# Patient Record
Sex: MALE | Race: WHITE | Employment: FULL TIME | URBAN - NONMETROPOLITAN AREA
[De-identification: names, ages, dates, MRNs, and addresses within clinical notes are randomized per-mention and may not be internally consistent; named-entity substitution may affect disease eponyms.]

---

## 2017-07-21 ENCOUNTER — OFFICE VISIT (OUTPATIENT)
Dept: FAMILY MEDICINE | Facility: CLINIC | Age: 52
End: 2017-07-21
Payer: COMMERCIAL

## 2017-07-21 VITALS
TEMPERATURE: 97.8 F | SYSTOLIC BLOOD PRESSURE: 110 MMHG | DIASTOLIC BLOOD PRESSURE: 80 MMHG | HEART RATE: 72 BPM | OXYGEN SATURATION: 98 % | WEIGHT: 261 LBS | BODY MASS INDEX: 33.51 KG/M2

## 2017-07-21 DIAGNOSIS — E66.811 OBESITY (BMI 30.0-34.9): ICD-10-CM

## 2017-07-21 DIAGNOSIS — R53.83 FATIGUE, UNSPECIFIED TYPE: ICD-10-CM

## 2017-07-21 DIAGNOSIS — M54.41 ACUTE LOW BACK PAIN WITH RIGHT-SIDED SCIATICA, UNSPECIFIED BACK PAIN LATERALITY: Primary | ICD-10-CM

## 2017-07-21 LAB
BASOPHILS # BLD AUTO: 0 10E9/L (ref 0–0.2)
BASOPHILS NFR BLD AUTO: 0.6 %
DIFFERENTIAL METHOD BLD: NORMAL
EOSINOPHIL # BLD AUTO: 0.2 10E9/L (ref 0–0.7)
EOSINOPHIL NFR BLD AUTO: 3 %
ERYTHROCYTE [DISTWIDTH] IN BLOOD BY AUTOMATED COUNT: 13.2 % (ref 10–15)
HCT VFR BLD AUTO: 46.1 % (ref 40–53)
HGB BLD-MCNC: 16.4 G/DL (ref 13.3–17.7)
LYMPHOCYTES # BLD AUTO: 1.5 10E9/L (ref 0.8–5.3)
LYMPHOCYTES NFR BLD AUTO: 23.3 %
MCH RBC QN AUTO: 29.6 PG (ref 26.5–33)
MCHC RBC AUTO-ENTMCNC: 35.6 G/DL (ref 31.5–36.5)
MCV RBC AUTO: 83 FL (ref 78–100)
MONOCYTES # BLD AUTO: 0.7 10E9/L (ref 0–1.3)
MONOCYTES NFR BLD AUTO: 11 %
NEUTROPHILS # BLD AUTO: 4 10E9/L (ref 1.6–8.3)
NEUTROPHILS NFR BLD AUTO: 62.1 %
PLATELET # BLD AUTO: 249 10E9/L (ref 150–450)
RBC # BLD AUTO: 5.54 10E12/L (ref 4.4–5.9)
WBC # BLD AUTO: 6.4 10E9/L (ref 4–11)

## 2017-07-21 PROCEDURE — 82607 VITAMIN B-12: CPT | Performed by: NURSE PRACTITIONER

## 2017-07-21 PROCEDURE — 99214 OFFICE O/P EST MOD 30 MIN: CPT | Performed by: NURSE PRACTITIONER

## 2017-07-21 PROCEDURE — 80050 GENERAL HEALTH PANEL: CPT | Performed by: NURSE PRACTITIONER

## 2017-07-21 PROCEDURE — 36415 COLL VENOUS BLD VENIPUNCTURE: CPT | Performed by: NURSE PRACTITIONER

## 2017-07-21 NOTE — MR AVS SNAPSHOT
After Visit Summary   7/21/2017    Karthik Bird    MRN: [id number]           Patient Information     Date Of Birth          1965        Visit Information        Provider Department      7/21/2017 9:40 AM Tina Ramirez CNP [facility]        Today's Diagnoses     Obesity (BMI 30.0-34.9)    -  1    Fatigue, unspecified type        Acute low back pain with right-sided sciatica, unspecified back pain laterality          Care Instructions    Schedule Physical Therapy. If we aren't seeing improvement in 4-6 weeks, then I would recommend MRI at that time    Schedule Sleep specialty appointment    Follow sleep hygiene    Lab results pending      Back Care Tips    Caring for your back  These are things you can do to prevent a recurrence of acute back pain and to reduce symptoms from chronic back pain:    Maintain a healthy weight. If you are overweight, losing weight will help most types of back pain.    Exercise is an important part of recovery from most types of back pain. The muscles behind and in front of the spine support the back. This means strengthening both the back muscles and the abdominal muscles will provide better support for your spine.     Swimming and brisk walking are good overall exercises to improve your fitness level.    Practice safe lifting methods (below).    Practice good posture when sitting, standing and walking. Avoid prolonged sitting. This puts more stress on the lower back than standing or walking.    Wear quality shoes with sufficient arch support. Foot and ankle alignment can affect back symptoms. Women should avoid wearing high heels.    Therapeutic massage can help relax the back muscles without stretching them.    During the first 24 to 72 hours after an acute injury or flare-up of chronic back pain, apply an ice pack to the painful area for 20 minutes and then remove it for 20 minutes, over a period of 60 to 90 minutes, or several times a day. As  a safety precaution, do not use a heating pad at bedtime. Sleeping on a heating pad can lead to skin burns or tissue damage.    You can alternate ice and heat therapies.  Medications  Talk to your healthcare provider before using medicines, especially if you have other medical problems or are taking other medicines.    You may use acetaminophen or ibuprofen to control pain, unless your healthcare provider prescribed other pain medicine. If you have chronic conditions like diabetes, liver or kidney disease, stomach ulcers, or gastrointestinal bleeding, or are taking blood thinners, talk with your healthcare provider before taking any medicines.    Be careful if you are given prescription pain medicines, narcotics, or medicine for muscle spasm. They can cause drowsiness, affect your coordination, reflexes, and judgment. Do not drive or operate heavy machinery while taking these types of medicines. Take prescription pain medicine only as prescribed by your healthcare provider.  Lumbar stretch  Here is a simple stretching exercise that will help relax muscle spasm and keep your back more limber. If exercise makes your back pain worse, don t do it.    Lie on your back with your knees bent and both feet on the ground.    Slowly raise your left knee to your chest as you flatten your lower back against the floor. Hold for 5 seconds.    Relax and repeat the exercise with your right knee.    Do 10 of these exercises for each leg.  Safe lifting method    Don t bend over at the waist to lift an object off the floor.  Instead, bend your knees and hips in a squat.     Keep your back and head upright    Hold the object close to your body, directly in front of you.    Straighten your legs to lift the object.     Lower the object to the floor in the reverse fashion.    If you must slide something across the floor, push it.  Posture tips  Sitting  Sit in chairs with straight backs or low-back support. Keep your knees lower than your  hips, with your feet flat on the floor.  When driving, sit up straight. Adjust the seat forward so you are not leaning toward the steering wheel.  A small pillow or rolled towel behind your lower back may help if you are driving long distances.   Standing  When standing for long periods, shift most of your weight to one leg at a time. Alternate legs every few minutes.   Sleeping  The best way to sleep is on your side with your knees bent. Put a low pillow under your head to support your neck in a neutral spine position. Avoid thick pillows that bend your neck to one side. Put a pillow between your legs to further relax your lower back. If you sleep on your back, put pillows under your knees to support your legs in a slightly flexed position. Use a firm mattress. If your mattress sags, replace it, or use a 1/2-inch plywood board under the mattress to add support.  Follow-up care  Follow up with your healthcare provider, or as advised.  If X-rays, a CT scan or an MRI scan were taken, they will be reviewed by a radiologist. You will be notified of any new findings that may affect your care.  Call 911  Seek emergency medical care if any of the following occur:    Trouble breathing    Confusion    Very drowsy    Fainting or loss of consciousness    Rapid or very slow heart rate    Loss of  bowel or bladder control  When to seek medical care  Call your healthcare provider if any of the following occur:    Pain becomes worse or spreads to your arms or legs    Weakness or numbness in one or both arms or legs    Numbness in the groin area  Date Last Reviewed: 6/1/2016 2000-2017 [employer]. [address] [place] [address], [place] [address]. All rights reserved. This information is not intended as a substitute for professional medical care. Always follow your healthcare professional's instructions.        Exercises to Strengthen Your Lower Back  Strong lower back and abdominal muscles work together to support your  spine. The exercises below will help strengthen the lower back. It is important that you begin exercising slowly and increase levels gradually.  Always begin any exercise program with stretching. If you feel pain while doing any of these exercises, stop and talk to your doctor about a more specific exercise program that better suits your condition.   Low back stretch  The point of stretching is to make you more flexible and increase your range of motion. Stretch only as much as you are able. Stretch slowly. Do not push your stretch to the limit. If at any point you feel pain while stretching, this is your (temporary) limit.    Lie on your back with your knees bent and both feet on the ground.    Slowly raise your left knee to your chest as you flatten your lower back against the floor. Hold for 5 seconds.    Relax and repeat the exercise with your right knee.    Do 10 of these exercises for each leg.    Repeat hugging both knees to your chest at the same time.  Building lower back strength  Start your exercise routine with 10 to 30 minutes a day, 1 to 3 times a day.  Initial exercises  Lying on your back:  1. Ankle pumps: Move your foot up and down, towards your head, and then away. Repeat 10 times with each foot.  2. Heel slides: Slowly bend your knee, drawing the heel of your foot towards you. Then slide your heel/foot from you, straightening your knee. Do not lift your foot off the floor (this is not a leg lift).  3. Abdominal contraction: Bend your knees and put your hands on your stomach. Tighten your stomach muscles. Hold for 5 seconds, then relax. Repeat 10 times.  4. Straight leg raise: Bend one leg at the knee and keep the other leg straight. Tighten your stomach muscles. Slowly lift your straight leg 6 to 12 inches off the floor and hold for up to 5 seconds. Repeat 10 times on each side.  Standin. Wall squats: Stand with your back against the wall. Move your feet about 12 inches away from the wall.  Tighten your stomach muscles, and slowly bend your knees until they are at about a 45 degree angle. Do not go down too far. Hold about 5 seconds. Then slowly return to your starting position. Repeat 10 times.  2. Heel raises: Stand facing the wall. Slowly raise the heels of your feet up and down, while keeping your toes on the floor. If you have trouble balancing, you can touch the wall with your hands. Repeat 10 times.  More advanced exercises  When you feel comfortable enough, try these exercises.  1. Kneeling lumbar extension: Begin on your hands and knees. At the same time, raise and straighten your right arm and left leg until they are parallel to the ground. Hold for 2 seconds and come back slowly to a starting position. Repeat with left arm and right leg, alternating 10 times.  2. Prone lumbar extension: Lie face down, arms extended overhead, palms on the floor. At the same time, raise your right arm and left leg as high as comfortably possible. Hold for 10 seconds and slowly return to start. Repeat with left arm and right leg, alternating 10 times. Gradually build up to 20 times. (Advanced: Repeat this exercise raising both arms and both legs a few inches off the floor at the same time. Hold for 5 seconds and release.)  3. Pelvic tilt: Lie on the floor on your back with your knees bent at 90 degrees. Your feet should be flat on the floor. Inhale, exhale, then slowly contract your abdominal muscles bringing your navel toward your spine. Let your pelvis rock back until your lower back is flat on the floor. Hold for 10 seconds while breathing smoothly.  4. Abdominal crunch: Perform a pelvic tilt (above) flattening your lower back against the floor. Holding the tension in your abdominal muscles, take another breath and raise your shoulder blades off the ground (this is not a full sit-up). Keep your head in line with your body (don t bend your neck forward). Hold for 2 seconds, then slowly lower.  Date Last  "Reviewed: 6/1/2016 2000-2017 [employer]. [address] [place] [address], [place] [address]. All rights reserved. This information is not intended as a substitute for professional medical care. Always follow your healthcare professional's instructions.                Follow-ups after your visit        Additional Services     PHYSICAL THERAPY REFERRAL       *This therapy referral will be filtered to a centralized scheduling office at [facility] and the patient will receive a call to schedule an appointment at a [facility] location most convenient for them. *     [facility] provides Physical Therapy evaluation and treatment and many specialty services across the [facility] system.  If requesting a specialty program, please choose from the list below.    If you have not heard from the scheduling office within 2 business days, please call [phone] for all locations, with the exception of [place], please call [phone].  Treatment: Evaluation & Treatment  Special Instructions/Modalities:   Special Programs:     Please be aware that coverage of these services is subject to the terms and limitations of your health insurance plan.  Call member services at your health plan with any benefit or coverage questions.      **Note to Provider:  If you are referring outside of [facility] for the therapy appointment, please list the name of the location in the \"special instructions\" above, print the referral and give to the patient to schedule the appointment.            SLEEP EVALUATION & MANAGEMENT REFERRAL - ADULT       Please be aware that coverage of these services is subject to the terms and limitations of your health insurance plan.  Call member services at your health plan with any benefit or coverage questions.      Please bring the following to your appointment:    >>   List of current medications   >>   This referral request   >>   Any documents/labs given to you for this " referral    [facility] Lab  Ph [phone] (Age 2 and up)                  Future tests that were ordered for you today     Open Future Orders        Priority Expected Expires Ordered    SLEEP EVALUATION & MANAGEMENT REFERRAL - ADULT Routine  7/21/2018 7/21/2017            Who to contact     If you have questions or need follow up information about today's clinic visit or your schedule please contact [facility] directly at [phone].  Normal or non-critical lab and imaging results will be communicated to you by [employer], letter or phone within 4 business days after the clinic has received the results. If you do not hear from us within 7 days, please contact the clinic through [employer] or phone. If you have a critical or abnormal lab result, we will notify you by phone as soon as possible.  Submit refill requests through [employer] or call your pharmacy and they will forward the refill request to us. Please allow 3 business days for your refill to be completed.          Additional Information About Your Visit        [employer] Information     [employer] gives you secure access to your electronic health record. If you see a primary care provider, you can also send messages to your care team and make appointments. If you have questions, please call your primary care clinic.  If you do not have a primary care provider, please call [phone] and they will assist you.        Care EveryWhere ID     This is your Care EveryWhere ID. This could be used by other organizations to access your [facility] medical records  [id number]        Your Vitals Were     Pulse Temperature Pulse Oximetry BMI (Body Mass Index)          72 97.8  F (36.6  C) (Tympanic) 98% 33.51 kg/m2         Blood Pressure from Last 3 Encounters:   07/21/17 110/80   09/20/16 118/74   01/14/16 122/66    Weight from Last 3 Encounters:   07/21/17 261 lb (118.4 kg)   09/20/16 235 lb 9.6 oz (106.9 kg)   01/14/16 216 lb (98 kg)               We Performed the Following     CBC with platelets differential     Comprehensive metabolic panel     PHYSICAL THERAPY REFERRAL     TSH with free T4 reflex     Vitamin B12        Primary Care Provider Office Phone # Fax #    Tina Ramirez, [facility] [phone]       [facility] [address] [facility] [address]        Equal Access to Services     GIULIA HERRING : Hadii aad ku hadasho Soomaali, waaxda luqadaha, qaybta kaalmada adeegyada, waxay paolain hayaan adecarlene daleykrystamarkell sutton. So [facility] [phone].    ATENCIÓN: Si habla español, tiene a molina disposición servicios gratuitos de asistencia lingüística. Llame al [phone].    We comply with applicable federal civil rights laws and [place] laws. We do not discriminate on the basis of race, color, national origin, age, disability sex, sexual orientation or gender identity.            Thank you!     Thank you for choosing [facility]  for your care. Our goal is always to provide you with excellent care. Hearing back from our patients is one way we can continue to improve our services. Please take a few minutes to complete the written survey that you may receive in the mail after your visit with us. Thank you!             Your Updated Medication List - Protect others around you: Learn how to safely use, store and throw away your medicines at www.disposemymeds.org.          This list is accurate as of: 7/21/17 10:30 AM.  Always use your most recent med list.                   Brand Name Dispense Instructions for use Diagnosis    order for DME     1 unit marking on an U-100 insulin syringe    Equipment being ordered: heel cups    Plantar fasciitis       SUMAtriptan 25 MG tablet    IMITREX    30 tablet    Take 1-2 tablets (25-50 mg) by mouth at onset of headache May repeat in 1-2 hours after onset.    Chronic migraine without aura without status migrainosus, not intractable

## 2017-07-21 NOTE — PATIENT INSTRUCTIONS
Schedule Physical Therapy. If we aren't seeing improvement in 4-6 weeks, then I would recommend MRI at that time    Schedule Sleep specialty appointment    Follow sleep hygiene    Lab results pending      Back Care Tips    Caring for your back  These are things you can do to prevent a recurrence of acute back pain and to reduce symptoms from chronic back pain:    Maintain a healthy weight. If you are overweight, losing weight will help most types of back pain.    Exercise is an important part of recovery from most types of back pain. The muscles behind and in front of the spine support the back. This means strengthening both the back muscles and the abdominal muscles will provide better support for your spine.     Swimming and brisk walking are good overall exercises to improve your fitness level.    Practice safe lifting methods (below).    Practice good posture when sitting, standing and walking. Avoid prolonged sitting. This puts more stress on the lower back than standing or walking.    Wear quality shoes with sufficient arch support. Foot and ankle alignment can affect back symptoms. Women should avoid wearing high heels.    Therapeutic massage can help relax the back muscles without stretching them.    During the first 24 to 72 hours after an acute injury or flare-up of chronic back pain, apply an ice pack to the painful area for 20 minutes and then remove it for 20 minutes, over a period of 60 to 90 minutes, or several times a day. As a safety precaution, do not use a heating pad at bedtime. Sleeping on a heating pad can lead to skin burns or tissue damage.    You can alternate ice and heat therapies.  Medications  Talk to your healthcare provider before using medicines, especially if you have other medical problems or are taking other medicines.    You may use acetaminophen or ibuprofen to control pain, unless your healthcare provider prescribed other pain medicine. If you have chronic conditions like  diabetes, liver or kidney disease, stomach ulcers, or gastrointestinal bleeding, or are taking blood thinners, talk with your healthcare provider before taking any medicines.    Be careful if you are given prescription pain medicines, narcotics, or medicine for muscle spasm. They can cause drowsiness, affect your coordination, reflexes, and judgment. Do not drive or operate heavy machinery while taking these types of medicines. Take prescription pain medicine only as prescribed by your healthcare provider.  Lumbar stretch  Here is a simple stretching exercise that will help relax muscle spasm and keep your back more limber. If exercise makes your back pain worse, don t do it.    Lie on your back with your knees bent and both feet on the ground.    Slowly raise your left knee to your chest as you flatten your lower back against the floor. Hold for 5 seconds.    Relax and repeat the exercise with your right knee.    Do 10 of these exercises for each leg.  Safe lifting method    Don t bend over at the waist to lift an object off the floor.  Instead, bend your knees and hips in a squat.     Keep your back and head upright    Hold the object close to your body, directly in front of you.    Straighten your legs to lift the object.     Lower the object to the floor in the reverse fashion.    If you must slide something across the floor, push it.  Posture tips  Sitting  Sit in chairs with straight backs or low-back support. Keep your knees lower than your hips, with your feet flat on the floor.  When driving, sit up straight. Adjust the seat forward so you are not leaning toward the steering wheel.  A small pillow or rolled towel behind your lower back may help if you are driving long distances.   Standing  When standing for long periods, shift most of your weight to one leg at a time. Alternate legs every few minutes.   Sleeping  The best way to sleep is on your side with your knees bent. Put a low pillow under your head  to support your neck in a neutral spine position. Avoid thick pillows that bend your neck to one side. Put a pillow between your legs to further relax your lower back. If you sleep on your back, put pillows under your knees to support your legs in a slightly flexed position. Use a firm mattress. If your mattress sags, replace it, or use a 1/2-inch plywood board under the mattress to add support.  Follow-up care  Follow up with your healthcare provider, or as advised.  If X-rays, a CT scan or an MRI scan were taken, they will be reviewed by a radiologist. You will be notified of any new findings that may affect your care.  Call 911  Seek emergency medical care if any of the following occur:    Trouble breathing    Confusion    Very drowsy    Fainting or loss of consciousness    Rapid or very slow heart rate    Loss of  bowel or bladder control  When to seek medical care  Call your healthcare provider if any of the following occur:    Pain becomes worse or spreads to your arms or legs    Weakness or numbness in one or both arms or legs    Numbness in the groin area  Date Last Reviewed: 6/1/2016 2000-2017 [employer]. [address] [place], [place] [id number]. All rights reserved. This information is not intended as a substitute for professional medical care. Always follow your healthcare professional's instructions.        Exercises to Strengthen Your Lower Back  Strong lower back and abdominal muscles work together to support your spine. The exercises below will help strengthen the lower back. It is important that you begin exercising slowly and increase levels gradually.  Always begin any exercise program with stretching. If you feel pain while doing any of these exercises, stop and talk to your doctor about a more specific exercise program that better suits your condition.   Low back stretch  The point of stretching is to make you more flexible and increase your range of motion. Stretch only as  much as you are able. Stretch slowly. Do not push your stretch to the limit. If at any point you feel pain while stretching, this is your (temporary) limit.    Lie on your back with your knees bent and both feet on the ground.    Slowly raise your left knee to your chest as you flatten your lower back against the floor. Hold for 5 seconds.    Relax and repeat the exercise with your right knee.    Do 10 of these exercises for each leg.    Repeat hugging both knees to your chest at the same time.  Building lower back strength  Start your exercise routine with 10 to 30 minutes a day, 1 to 3 times a day.  Initial exercises  Lying on your back:  1. Ankle pumps: Move your foot up and down, towards your head, and then away. Repeat 10 times with each foot.  2. Heel slides: Slowly bend your knee, drawing the heel of your foot towards you. Then slide your heel/foot from you, straightening your knee. Do not lift your foot off the floor (this is not a leg lift).  3. Abdominal contraction: Bend your knees and put your hands on your stomach. Tighten your stomach muscles. Hold for 5 seconds, then relax. Repeat 10 times.  4. Straight leg raise: Bend one leg at the knee and keep the other leg straight. Tighten your stomach muscles. Slowly lift your straight leg 6 to 12 inches off the floor and hold for up to 5 seconds. Repeat 10 times on each side.  Standin. Wall squats: Stand with your back against the wall. Move your feet about 12 inches away from the wall. Tighten your stomach muscles, and slowly bend your knees until they are at about a 45 degree angle. Do not go down too far. Hold about 5 seconds. Then slowly return to your starting position. Repeat 10 times.  2. Heel raises: Stand facing the wall. Slowly raise the heels of your feet up and down, while keeping your toes on the floor. If you have trouble balancing, you can touch the wall with your hands. Repeat 10 times.  More advanced exercises  When you feel comfortable  enough, try these exercises.  1. Kneeling lumbar extension: Begin on your hands and knees. At the same time, raise and straighten your right arm and left leg until they are parallel to the ground. Hold for 2 seconds and come back slowly to a starting position. Repeat with left arm and right leg, alternating 10 times.  2. Prone lumbar extension: Lie face down, arms extended overhead, palms on the floor. At the same time, raise your right arm and left leg as high as comfortably possible. Hold for 10 seconds and slowly return to start. Repeat with left arm and right leg, alternating 10 times. Gradually build up to 20 times. (Advanced: Repeat this exercise raising both arms and both legs a few inches off the floor at the same time. Hold for 5 seconds and release.)  3. Pelvic tilt: Lie on the floor on your back with your knees bent at 90 degrees. Your feet should be flat on the floor. Inhale, exhale, then slowly contract your abdominal muscles bringing your navel toward your spine. Let your pelvis rock back until your lower back is flat on the floor. Hold for 10 seconds while breathing smoothly.  4. Abdominal crunch: Perform a pelvic tilt (above) flattening your lower back against the floor. Holding the tension in your abdominal muscles, take another breath and raise your shoulder blades off the ground (this is not a full sit-up). Keep your head in line with your body (don t bend your neck forward). Hold for 2 seconds, then slowly lower.  Date Last Reviewed: 6/1/2016 2000-2017 [employer]. [address] [place] [address], [place] [id number]. All rights reserved. This information is not intended as a substitute for professional medical care. Always follow your healthcare professional's instructions.

## 2017-07-21 NOTE — PROGRESS NOTES
SUBJECTIVE:                                                    Karthik Bird is a 52 year old male who presents to clinic today for the following health issues:    Back Pain     Sitting right leg crossed over left leg    Right side lower back    No new shoes    No physical activity- sedentary        Duration: 4-5  months         Specific cause: none    Description:   Location of pain: low back right  Character of pain: sharp and stabbing  Pain radiation:radiates into the right leg  New numbness or weakness in legs, not attributed to pain:  no     Intensity: Currently 6/10, At its worst 10/10    History:   Pain interferes with job: YES, sits in office chair   History of back problems: Yes   Any previous MRI or X-rays: None  Sees a specialist for back pain:  No  Therapies tried without relief: heat and stretch    Alleviating factors:   Improved by: laying down       Precipitating factors:  Worsened by: Bending, Standing, Sitting and Walking    Functional and Psychosocial Screen (Dioni STarT Back):      Not performed today        Accompanying Signs & Symptoms:  Risk of Fracture:  None  Risk of Cauda Equina:  None  Risk of Infection:  None  Risk of Cancer:  None  Risk of Ankylosing Spondylitis:  Onset at age <35, male, AND morning back stiffness. no     Second line: NSAIDS, muscle relaxants, and consider gabapentin for radiculopathy; opioids are usually not needed}    Wt Readings from Last 10 Encounters:   07/21/17 261 lb (118.4 kg)   09/20/16 235 lb 9.6 oz (106.9 kg)   01/14/16 216 lb (98 kg)   09/10/15 204 lb (92.5 kg)   06/23/15 218 lb (98.9 kg)   04/10/15 234 lb (106.1 kg)   03/26/15 237 lb 3.2 oz (107.6 kg)   02/26/15 244 lb (110.7 kg)   02/25/15 245 lb (111.1 kg)   02/02/15 243 lb (110.2 kg)         Fatigue      Duration: lifetime     Intensity:  moderate    Accompanying signs and symptoms: driving, watching tv, anything he feels tired all the time     History (similar episodes/previous evaluation): says he has  been like this his whole life     Precipitating or alleviating factors: None    Therapies tried and outcome: None   Driving 37 miles commute  Feeling tired while driving  Not feeling rested  No depression/anxiety  Sedentary lifestyle- weight gain  [place] sleepiness scale 16- see scanned document    HPI:   PCP:  Tina Ramirez, KATIE [phone]    Patient Active Problem List   Diagnosis     Chronic migraine without aura, not intractable     Colon polyp     CARDIOVASCULAR SCREENING; LDL GOAL LESS THAN 160     Obesity (BMI 30.0-34.9)     ED (erectile dysfunction)     H/O rotator cuff tear     Current Outpatient Prescriptions   Medication     order for DME     SUMAtriptan (IMITREX) 25 MG tablet     No current facility-administered medications for this visit.        Reviewed and updated:  Tobacco  Allergies  Meds  Med Hx  Surg Hx  Fam Hx  Soc Hx     ROS:  Constitutional, HEENT, cardiovascular, pulmonary, gi and gu systems are negative, except as otherwise noted.  CONSTITUTIONAL:NEGATIVE for fever, chills, change in weight and weight gain  MUSCULOSKELETAL: NEGATIVE for significant arthralgias or myalgia and back pain on right side with occasional radiculopathy  PSYCHIATRIC: NEGATIVE for changes in mood or affect      PHYSICAL EXAM:   /80 (BP Location: Right arm, Patient Position: Right side, Cuff Size: Adult Large)  Pulse 72  Temp 97.8  F (36.6  C) (Tympanic)  Wt 261 lb (118.4 kg)  SpO2 98%  BMI 33.51 kg/m2  Body mass index is 33.51 kg/(m^2).  GENERAL APPEARANCE: healthy, alert and no distress  NECK: no adenopathy, no asymmetry, masses, or scars and thyroid normal to palpation  RESP: lungs clear to auscultation - no rales, rhonchi or wheezes  CV: regular rates and rhythm, normal S1 S2, no S3 or S4 and no murmur, click or rub  MS: extremities normal- no gross deformities noted  ORTHO:   Low back exam:  Inspection:      no visible deformity in the low back       normal skin       normal vascular        normal lymphatic       no asymmetry  Posture:      normal  Tender :     none  ROM:      full flexion       full extension       no asymmetric rotation of the pelvis with flexion  Strength:     hip flexion 4/5 =right       knee extension 5/5 bilateral       ankle dorsiflexion 5/5 bilateral       ankle plantarflexion 5/5 bilateral       dorsiflexion of the great toe 5/5 bilateral       able to heel and toe walk  Reflexes:      patellar (L3, L4) symmetric normal       achilles tendons (S1) symmetric normal  Sensation:     grossly intact throughout lower extremities  Special tests:      straight leg raise right  positive        NEURO: Normal strength and tone, mentation intact and speech normal  PSYCH: mentation appears normal and affect normal/bright    ASSESSMENT & PLAN:   (M54.41) Acute low back pain with right-sided sciatica, unspecified back pain laterality  Comment: acute on chronic, stable  Plan: PHYSICAL THERAPY REFERRAL    (R53.83) Fatigue, unspecified type  Comment: chronic  Plan: SLEEP EVALUATION & MANAGEMENT REFERRAL - ADULT,        TSH with free T4 reflex, CBC with platelets         differential, Comprehensive metabolic panel,         Vitamin B12         (E66.9) Obesity (BMI 30.0-34.9)  (primary encounter diagnosis)  Comment: chronic, stable  Plan: recommend increase physical activity    Patient Instructions   Schedule Physical Therapy. If we aren't seeing improvement in 4-6 weeks, then I would recommend MRI at that time    Schedule Sleep specialty appointment    Follow sleep hygiene    Lab results pending      Back Care Tips    Caring for your back  These are things you can do to prevent a recurrence of acute back pain and to reduce symptoms from chronic back pain:    Maintain a healthy weight. If you are overweight, losing weight will help most types of back pain.    Exercise is an important part of recovery from most types of back pain. The muscles behind and in front of the spine support the back.  This means strengthening both the back muscles and the abdominal muscles will provide better support for your spine.     Swimming and brisk walking are good overall exercises to improve your fitness level.    Practice safe lifting methods (below).    Practice good posture when sitting, standing and walking. Avoid prolonged sitting. This puts more stress on the lower back than standing or walking.    Wear quality shoes with sufficient arch support. Foot and ankle alignment can affect back symptoms. Women should avoid wearing high heels.    Therapeutic massage can help relax the back muscles without stretching them.    During the first 24 to 72 hours after an acute injury or flare-up of chronic back pain, apply an ice pack to the painful area for 20 minutes and then remove it for 20 minutes, over a period of 60 to 90 minutes, or several times a day. As a safety precaution, do not use a heating pad at bedtime. Sleeping on a heating pad can lead to skin burns or tissue damage.    You can alternate ice and heat therapies.  Medications  Talk to your healthcare provider before using medicines, especially if you have other medical problems or are taking other medicines.    You may use acetaminophen or ibuprofen to control pain, unless your healthcare provider prescribed other pain medicine. If you have chronic conditions like diabetes, liver or kidney disease, stomach ulcers, or gastrointestinal bleeding, or are taking blood thinners, talk with your healthcare provider before taking any medicines.    Be careful if you are given prescription pain medicines, narcotics, or medicine for muscle spasm. They can cause drowsiness, affect your coordination, reflexes, and judgment. Do not drive or operate heavy machinery while taking these types of medicines. Take prescription pain medicine only as prescribed by your healthcare provider.  Lumbar stretch  Here is a simple stretching exercise that will help relax muscle spasm and keep  your back more limber. If exercise makes your back pain worse, don t do it.    Lie on your back with your knees bent and both feet on the ground.    Slowly raise your left knee to your chest as you flatten your lower back against the floor. Hold for 5 seconds.    Relax and repeat the exercise with your right knee.    Do 10 of these exercises for each leg.  Safe lifting method    Don t bend over at the waist to lift an object off the floor.  Instead, bend your knees and hips in a squat.     Keep your back and head upright    Hold the object close to your body, directly in front of you.    Straighten your legs to lift the object.     Lower the object to the floor in the reverse fashion.    If you must slide something across the floor, push it.  Posture tips  Sitting  Sit in chairs with straight backs or low-back support. Keep your knees lower than your hips, with your feet flat on the floor.  When driving, sit up straight. Adjust the seat forward so you are not leaning toward the steering wheel.  A small pillow or rolled towel behind your lower back may help if you are driving long distances.   Standing  When standing for long periods, shift most of your weight to one leg at a time. Alternate legs every few minutes.   Sleeping  The best way to sleep is on your side with your knees bent. Put a low pillow under your head to support your neck in a neutral spine position. Avoid thick pillows that bend your neck to one side. Put a pillow between your legs to further relax your lower back. If you sleep on your back, put pillows under your knees to support your legs in a slightly flexed position. Use a firm mattress. If your mattress sags, replace it, or use a 1/2-inch plywood board under the mattress to add support.  Follow-up care  Follow up with your healthcare provider, or as advised.  If X-rays, a CT scan or an MRI scan were taken, they will be reviewed by a radiologist. You will be notified of any new findings that may  affect your care.  Call 911  Seek emergency medical care if any of the following occur:    Trouble breathing    Confusion    Very drowsy    Fainting or loss of consciousness    Rapid or very slow heart rate    Loss of  bowel or bladder control  When to seek medical care  Call your healthcare provider if any of the following occur:    Pain becomes worse or spreads to your arms or legs    Weakness or numbness in one or both arms or legs    Numbness in the groin area  Date Last Reviewed: 6/1/2016 2000-2017 [employer]. [address] [place] [id number]. All rights reserved. This information is not intended as a substitute for professional medical care. Always follow your healthcare professional's instructions.        Exercises to Strengthen Your Lower Back  Strong lower back and abdominal muscles work together to support your spine. The exercises below will help strengthen the lower back. It is important that you begin exercising slowly and increase levels gradually.  Always begin any exercise program with stretching. If you feel pain while doing any of these exercises, stop and talk to your doctor about a more specific exercise program that better suits your condition.   Low back stretch  The point of stretching is to make you more flexible and increase your range of motion. Stretch only as much as you are able. Stretch slowly. Do not push your stretch to the limit. If at any point you feel pain while stretching, this is your (temporary) limit.    Lie on your back with your knees bent and both feet on the ground.    Slowly raise your left knee to your chest as you flatten your lower back against the floor. Hold for 5 seconds.    Relax and repeat the exercise with your right knee.    Do 10 of these exercises for each leg.    Repeat hugging both knees to your chest at the same time.  Building lower back strength  Start your exercise routine with 10 to 30 minutes a day, 1 to 3 times a  day.  Initial exercises  Lying on your back:  1. Ankle pumps: Move your foot up and down, towards your head, and then away. Repeat 10 times with each foot.  2. Heel slides: Slowly bend your knee, drawing the heel of your foot towards you. Then slide your heel/foot from you, straightening your knee. Do not lift your foot off the floor (this is not a leg lift).  3. Abdominal contraction: Bend your knees and put your hands on your stomach. Tighten your stomach muscles. Hold for 5 seconds, then relax. Repeat 10 times.  4. Straight leg raise: Bend one leg at the knee and keep the other leg straight. Tighten your stomach muscles. Slowly lift your straight leg 6 to 12 inches off the floor and hold for up to 5 seconds. Repeat 10 times on each side.  Standin. Wall squats: Stand with your back against the wall. Move your feet about 12 inches away from the wall. Tighten your stomach muscles, and slowly bend your knees until they are at about a 45 degree angle. Do not go down too far. Hold about 5 seconds. Then slowly return to your starting position. Repeat 10 times.  2. Heel raises: Stand facing the wall. Slowly raise the heels of your feet up and down, while keeping your toes on the floor. If you have trouble balancing, you can touch the wall with your hands. Repeat 10 times.  More advanced exercises  When you feel comfortable enough, try these exercises.  1. Kneeling lumbar extension: Begin on your hands and knees. At the same time, raise and straighten your right arm and left leg until they are parallel to the ground. Hold for 2 seconds and come back slowly to a starting position. Repeat with left arm and right leg, alternating 10 times.  2. Prone lumbar extension: Lie face down, arms extended overhead, palms on the floor. At the same time, raise your right arm and left leg as high as comfortably possible. Hold for 10 seconds and slowly return to start. Repeat with left arm and right leg, alternating 10 times.  Gradually build up to 20 times. (Advanced: Repeat this exercise raising both arms and both legs a few inches off the floor at the same time. Hold for 5 seconds and release.)  3. Pelvic tilt: Lie on the floor on your back with your knees bent at 90 degrees. Your feet should be flat on the floor. Inhale, exhale, then slowly contract your abdominal muscles bringing your navel toward your spine. Let your pelvis rock back until your lower back is flat on the floor. Hold for 10 seconds while breathing smoothly.  4. Abdominal crunch: Perform a pelvic tilt (above) flattening your lower back against the floor. Holding the tension in your abdominal muscles, take another breath and raise your shoulder blades off the ground (this is not a full sit-up). Keep your head in line with your body (don t bend your neck forward). Hold for 2 seconds, then slowly lower.  Date Last Reviewed: 6/1/2016 2000-2017 [employer]. [address] [place] [address], [place] [address]. All rights reserved. This information is not intended as a substitute for professional medical care. Always follow your healthcare professional's instructions.          Risks, benefits, side effects and rationale for treatment plan fully discussed with the patient and understanding expressed.    JOY Bella-BC  [facility]

## 2017-07-21 NOTE — NURSING NOTE
"Chief Complaint   Patient presents with     Back Pain     Fatigue       Initial LMP 02/24/2017 (Exact Date) Estimated body mass index is 23.49 kg/(m^2) as calculated from the following:    Height as of 4/4/17: 5' 7\" (1.702 m).    Weight as of 4/4/17: 150 lb (68 kg).  Medication Reconciliation: complete     Svitlana Will, LARRY   "

## 2017-07-22 LAB
ALBUMIN SERPL-MCNC: 3.9 G/DL (ref 3.4–5)
ALP SERPL-CCNC: 66 U/L (ref 40–150)
ALT SERPL W P-5'-P-CCNC: 30 U/L (ref 0–70)
ANION GAP SERPL CALCULATED.3IONS-SCNC: 7 MMOL/L (ref 3–14)
AST SERPL W P-5'-P-CCNC: 26 U/L (ref 0–45)
BILIRUB SERPL-MCNC: 0.3 MG/DL (ref 0.2–1.3)
BUN SERPL-MCNC: 17 MG/DL (ref 7–30)
CALCIUM SERPL-MCNC: 8.7 MG/DL (ref 8.5–10.1)
CHLORIDE SERPL-SCNC: 104 MMOL/L (ref 94–109)
CO2 SERPL-SCNC: 28 MMOL/L (ref 20–32)
CREAT SERPL-MCNC: 0.9 MG/DL (ref 0.66–1.25)
GFR SERPL CREATININE-BSD FRML MDRD: 88 ML/MIN/1.7M2
GLUCOSE SERPL-MCNC: 73 MG/DL (ref 70–99)
POTASSIUM SERPL-SCNC: 4.1 MMOL/L (ref 3.4–5.3)
PROT SERPL-MCNC: 7.1 G/DL (ref 6.8–8.8)
SODIUM SERPL-SCNC: 139 MMOL/L (ref 133–144)
TSH SERPL DL<=0.005 MIU/L-ACNC: 1.29 MU/L (ref 0.4–4)
VIT B12 SERPL-MCNC: 528 PG/ML (ref 193–986)

## 2017-07-23 NOTE — PROGRESS NOTES
Dear Karthik,  TSH (thyroid) normal  Comprehensive metabolic panel normal  CBC normal  Vitamin B12 normal  Please contact our clinic via phone or My Chart if you have any questions or concerns.  Thanks,  JOY Greene

## 2017-07-24 ENCOUNTER — MYC MEDICAL ADVICE (OUTPATIENT)
Dept: FAMILY MEDICINE | Facility: CLINIC | Age: 52
End: 2017-07-24

## 2017-07-24 DIAGNOSIS — M79.10 MUSCLE PAIN: Primary | ICD-10-CM

## 2017-07-24 NOTE — TELEPHONE ENCOUNTER
Per 07-21-17 OV-      ASSESSMENT & PLAN:   (M54.41) Acute low back pain with right-sided sciatica, unspecified back pain laterality  Comment: acute on chronic, stable  Plan: PHYSICAL THERAPY REFERRAL     (R53.83) Fatigue, unspecified type  Comment: chronic  Plan: SLEEP EVALUATION & MANAGEMENT REFERRAL - ADULT,        TSH with free T4 reflex, CBC with platelets         differential, Comprehensive metabolic panel,         Vitamin B12      (E66.9) Obesity (BMI 30.0-34.9)  (primary encounter diagnosis)  Comment: chronic, stable  Plan: recommend increase physical activity

## 2017-07-26 ENCOUNTER — HOSPITAL ENCOUNTER (OUTPATIENT)
Dept: PHYSICAL THERAPY | Facility: CLINIC | Age: 52
Setting detail: THERAPIES SERIES
End: 2017-07-26
Attending: NURSE PRACTITIONER
Payer: COMMERCIAL

## 2017-07-26 DIAGNOSIS — M79.10 MUSCLE PAIN: ICD-10-CM

## 2017-07-26 PROCEDURE — 97110 THERAPEUTIC EXERCISES: CPT | Mod: GP | Performed by: PHYSICAL THERAPIST

## 2017-07-26 PROCEDURE — 36415 COLL VENOUS BLD VENIPUNCTURE: CPT | Performed by: NURSE PRACTITIONER

## 2017-07-26 PROCEDURE — 97140 MANUAL THERAPY 1/> REGIONS: CPT | Mod: GP | Performed by: PHYSICAL THERAPIST

## 2017-07-26 PROCEDURE — 40000718 ZZHC STATISTIC PT DEPARTMENT ORTHO VISIT: Performed by: PHYSICAL THERAPIST

## 2017-07-26 PROCEDURE — 97161 PT EVAL LOW COMPLEX 20 MIN: CPT | Mod: GP | Performed by: PHYSICAL THERAPIST

## 2017-07-26 PROCEDURE — 86618 LYME DISEASE ANTIBODY: CPT | Performed by: NURSE PRACTITIONER

## 2017-07-26 NOTE — PROGRESS NOTES
07/26/17 1400   General Information   Type of Visit Initial OP Ortho PT Evaluation   Start of Care Date 07/26/17   Referring Physician [place]   Patient/Family Goals Statement find out why it is hurting, get better so that he can sit, walk and manage his symptoms with stretching.    Orders Evaluate and Treat   Date of Order 07/21/17   Insurance Type Other  (preferred one)   Medical Diagnosis Acute LBP with R sided sciatica   Surgical/Medical history reviewed Yes   Precautions/Limitations no known precautions/limitations   Body Part(s)   Body Part(s) Lumbar Spine/SI   Presentation and Etiology   Pertinent history of current problem (include personal factors and/or comorbidities that impact the POC) Pt states he only has pain on the right side low bck. pt states typically, he can self manage himself but this one isn't going away. Pt states lifting is the worst, even carrying groceries. geting up after sitting, walking and initially when moving from sitting. 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 pt states he does get sore after driving for long periods of time. pt states his pain is a 5/10, can make it increase to a 10 if he let's it get worse.    Impairments A. Pain;D. Decreased ROM;E. Decreased flexibility;F. Decreased strength and endurance;H. Impaired gait   Functional Limitations perform activities of daily living;perform required work activities;perform desired leisure / sports activities   Symptom Location right sided LBP   How/Where did it occur From insidious onset   Onset date of current episode/exacerbation 07/26/16   Pain rating (0-10 point scale) Best (/10);Worst (/10)   Best (/10) 5   Worst (/10) 10   Pain quality A. Sharp;B. Dull;C. Aching   Frequency of pain/symptoms A. Constant   Pain/symptoms exacerbated by A. Sitting;B. Walking;C. Lifting;D. Carrying;G. Certain positions;K. Home tasks   Pain/symptoms eased by E. Changing positions;F. Certain  positions;J. Braces/supports   Prior Level of Function   Prior Level of Function-Mobility Pt was able to perform lifting, carrying and sitting without back pain limiting participation with the activity.   Current Level of Function   Patient role/employment history A. Employed   Fall Risk Screen   Fall screen completed by PT   Per patient - Fall 2 or more times in past year? No   Per patient - Fall with injury in past year? No   Is patient a fall risk? No   Lumbar Spine/SI Objective Findings   Observation pt sitting with legs crossed throughout session indicating core weakness   Posture forward head, kyphosis    Gait/Locomotion no gait deviations observed   Flexion ROM WNL   Right Side Bending ROM limited 50% with mobility (half way to knee)   Left Side Bending ROM to superior patella   Pelvic Screen + gillets on the R   Hip Screen - hip ROM WNL B   Hip Extension Strength 4-/5 B   Hamstring Flexibility - tightness in 90/90 position B   Hip Flexor Flexibility - B   Piriformis Flexibility + B   SLR - B   Slump Test + on RLE, - on LLE   Segmental Mobility normal joint mobility in the lumbar spine with PA mobilizations   Palpation no tightness in the lumbar spine along erector spinae or QL.    Planned Therapy Interventions   Planned Therapy Interventions IADL retraining;balance training;gait training;joint mobilization;manual therapy;motor coordination training;neuromuscular re-education;ROM;strengthening;stretching   Clinical Impression   Criteria for Skilled Therapeutic Interventions Met yes, treatment indicated   PT Diagnosis Decreased ROM and strength secondary to recurrent LBP   Influenced by the following impairments decreased ROM, decreased strength   Functional limitations due to impairments decreased participation with walking, carrying and sitting   Clinical Presentation Stable/Uncomplicated   Clinical Presentation Rationale Pt presents with stable comorbidities, motivated to participate with PT   Clinical  Decision Making (Complexity) Low complexity   Therapy Frequency 1 time/week   Predicted Duration of Therapy Intervention (days/wks) 8 weeks   Risk & Benefits of therapy have been explained Yes   Patient, Family & other staff in agreement with plan of care Yes   Education Assessment   Preferred Learning Style Pictures/video   Barriers to Learning No barriers   ORTHO GOALS   PT Ortho Eval Goals 1;2;3   Ortho Goal 1   Goal Identifier HEP   Goal Description Pt will demonstrate proper form and duration of HEP in order to achieve personal goal of self management of symptoms   Target Date 09/06/17   Ortho Goal 2   Goal Identifier Strength   Goal Description Pt will demonstrate improved hip strength of 4/5 and core strength in order to sit with proper posture during activities.   Target Date 09/20/17   Ortho Goal 3   Goal Identifier ROM   Goal Description Pt will demonstrate full ROM with sidebending in order to particpiate with activities of lifting and carrying.   Target Date 09/20/17   Total Evaluation Time   Total Evaluation Time 25

## 2017-07-27 LAB — B BURGDOR IGG+IGM SER QL: 0.38 (ref 0–0.89)

## 2017-07-27 NOTE — PROGRESS NOTES
Dear Karthik,  Negative for Lyme Disease  Please contact our clinic via phone or My Chart if you have any questions or concerns.  Thanks,  JOY Greene

## 2017-08-02 ENCOUNTER — OFFICE VISIT (OUTPATIENT)
Dept: SLEEP MEDICINE | Facility: CLINIC | Age: 52
End: 2017-08-02
Attending: NURSE PRACTITIONER
Payer: COMMERCIAL

## 2017-08-02 VITALS
HEART RATE: 78 BPM | OXYGEN SATURATION: 95 % | BODY MASS INDEX: 33.16 KG/M2 | DIASTOLIC BLOOD PRESSURE: 75 MMHG | SYSTOLIC BLOOD PRESSURE: 115 MMHG | HEIGHT: 74 IN | WEIGHT: 258.4 LBS

## 2017-08-02 DIAGNOSIS — E66.09 NON MORBID OBESITY DUE TO EXCESS CALORIES: ICD-10-CM

## 2017-08-02 DIAGNOSIS — R53.83 FATIGUE, UNSPECIFIED TYPE: ICD-10-CM

## 2017-08-02 DIAGNOSIS — G47.19 EXCESSIVE DAYTIME SLEEPINESS: Primary | ICD-10-CM

## 2017-08-02 DIAGNOSIS — G47.59 SLEEP-RELATED HALLUCINATIONS: ICD-10-CM

## 2017-08-02 DIAGNOSIS — R53.83 OTHER FATIGUE: ICD-10-CM

## 2017-08-02 PROCEDURE — 99205 OFFICE O/P NEW HI 60 MIN: CPT | Performed by: FAMILY MEDICINE

## 2017-08-02 NOTE — PATIENT INSTRUCTIONS
Your BMI is Body mass index is 33.18 kg/(m^2).  Weight management is a personal decision.  If you are interested in exploring weight loss strategies, the following discussion covers the approaches that may be successful. Body mass index (BMI) is one way to tell whether you are at a healthy weight, overweight, or obese. It measures your weight in relation to your height.  A BMI of 18.5 to 24.9 is in the healthy range. A person with a BMI of 25 to 29.9 is considered overweight, and someone with a BMI of 30 or greater is considered obese. More than two-thirds of American adults are considered overweight or obese.  Being overweight or obese increases the risk for further weight gain. Excess weight may lead to heart disease and diabetes.  Creating and following plans for healthy eating and physical activity may help you improve your health.  Weight control is part of healthy lifestyle and includes exercise, emotional health, and healthy eating habits. Careful eating habits lifelong are the mainstay of weight control. Though there are significant health benefits from weight loss, long-term weight loss with diet alone may be very difficult to achieve- studies show long-term success with dietary management in less than 10% of people. Attaining a healthy weight may be especially difficult to achieve in those with severe obesity. In some cases, medications, devices and surgical management might be considered.  What can you do?  If you are overweight or obese and are interested in methods for weight loss, you should discuss this with your provider.     Consider reducing daily calorie intake by 500 calories.     Keep a food journal.     Avoiding skipping meals, consider cutting portions instead.    Diet combined with exercise helps maintain muscle while optimizing fat loss. Strength training is particularly important for building and maintaining muscle mass. Exercise helps reduce stress, increase energy, and improves fitness.  "Increasing exercise without diet control, however, may not burn enough calories to loose weight.       Start walking three days a week 10-20 minutes at a time    Work towards walking thirty minutes five days a week     Eventually, increase the speed of your walking for 1-2 minutes at time    In addition, we recommend that you review healthy lifestyles and methods for weight loss available through the National Institutes of Health patient information sites:  http://win.niddk.nih.gov/publications/index.htm    And look into health and wellness programs that may be available through your health insurance provider, employer, local community center, or cody club.    Weight management plan: Patient was referred to their PCP to discuss a diet and exercise plan.     MY TREATMENT INFORMATION FOR SLEEP APNEA -  Karthik Bird    DOCTOR: Frank Mendoza MD, MPH  SLEEP CENTER : [facility]         If I haven't had a sleep study yet, what can I expect?  A personal story from Artie  https://www.Responsys.com/watch?v=AxPLmlRpnCs      Am I having a home sleep study?  Here is a video in case you get home and want to make sure you have done it correctly  https://www.Responsys.com/watch?v=GVZ9S2yLtd9&feature=youtu.be      Frequently asked questions:  1. What is Obstructive Sleep Apnea (ANABEL)? ANABEL is the most common type of sleep apnea. Apnea literally means, \"without breath.\" It is characterized by repetitive pauses in breathing, despite continued effort to breathe, and is usually associated with a reduction in blood oxygen saturation. Apneas can last 10 to over 60 seconds. It is caused by narrowing or collapse of the upper airway as muscles relax during sleep. Severity of sleep apnea is determined by frequency of breathing events and their effect on your sleep and oxygen levels determined during sleep testing.     2. What are the consequences of ANABEL? Symptoms include: daytime sleepiness- possibly increasing the risk of " falling asleep while driving, unrefreshing/restless sleep, snoring, insomnia, waking frequently to urinate, waking with heartburn or reflux, reduced concentration and memory, and morning headaches. Other health consequences may include development of high blood pressure and other cardiovascular disease in persons who are susceptible. Untreated ANABEL  can contribute to heart disease, stroke and diabetes.     3. What are the treatment options? In most situations, sleep apnea is a lifelong disease that must be managed with daily therapy. Medications are not effective for sleep apnea and surgery is generally not performed until other therapies have been tried. Therapy is usually tailored to the individual patient based on many factors including your wishes as well as severity of sleep apnea and severity of obesity. Continuous Positive Airway (CPAP) is the most reliable treatment. An oral device to hold your jaw forward is usually the next most reliable option. Other options include postioning devices (to keep you off your back), weight loss, and surgery including a tongue pacing device. There is more detail about some of these options below.            1. CPAP-  WHAT DOES IT DO AND HOW CAN I LEARN TO WEAR IT?                               BEFORE I START, CAN I WATCH A MOVIE TO GET A PLAN ON HOW TO USE CPAP?  https://www.Flayr.com/watch?p=a9M06tj401X      Continuous positive airway pressure, or CPAP, is the most effective treatment for obstructive sleep apnea. It works by blowing room air, through a mask, to hold your throat open. A decision to use CPAP is a major step forward in the pursuit of a healthier life. The successful use of CPAP will help you breathe easier, sleep better and live healthier. You can choose CPAP equipment from any durable medical equipment provider that meets your needs.  Using CPAP can be a positive experience if you keep these armijo points in mind:  1. Commitment  CPAP is not a quick fix for your  "problem. It involves a long-term commitment to improve your sleep and your health.    2. Communication  Stay in close communication with both your sleep doctor and your CPAP supplier. Ask lots of questions and seek help when you need it.    3. Consistency  Use CPAP all night, every night and for every nap. You will receive the maximum health benefits from CPAP when you use it every time that you sleep. This will also make it easier for your body to adjust to the treatment.    4. Correction  The first machine and mask that you try may not be the best ones for you. Work with your sleep doctor and your CPAP supplier to make corrections to your equipment selection. Ask about trying a different type of machine or mask if you have ongoing problems. Make sure that your mask is a good fit and learn to use your equipment properly.    5. Challenge  Tell a family member or close friend to ask you each morning if you used your CPAP the previous night. Have someone to challenge you to give it your best effort.    6. Connection   Your adjustment to CPAP will be easier if you are able to connect with others who use the same treatment. Ask your sleep doctor if there is a support group in your area for people who have sleep apnea, or look for one on the Internet.  7. Comfort   Increase your level of comfort by using a saline spray, decongestant or heated humidifier if CPAP irritates your nose, mouth or throat. Use your unit's \"ramp\" setting to slowly get used to the air pressure level. There may be soft pads you can buy that will fit over your mask straps. Look on www.CPAP.com for accessories that can help make CPAP use more comfortable.  8. Cleaning   Clean your mask, tubing and headgear on a regular basis. Put this time in your schedule so that you don't forget to do it. Check and replace the filters for your CPAP unit and humidifier.    9. Completion   Although you are never finished with CPAP therapy, you should reward yourself " by celebrating the completion of your first month of treatment. Expect this first month to be your hardest period of adjustment. It will involve some trial and error as you find the machine, mask and pressure settings that are right for you.    10. Continuation  After your first month of treatment, continue to make a daily commitment to use your CPAP all night, every night and for every nap.    CPAP-Tips to starting with success:  Begin using your CPAP for short periods of time during the day while you watch TV or read.    Use CPAP every night and for every nap. Using it less often reduces the health benefits and makes it harder for your body to get used to it.    Make small adjustments to your mask, tubing, straps and headgear until you get the right fit. Tightening the mask may actually worsen the leak.  If it leaves significant marks on your face or irritates the bridge of your nose, it may not be the best mask for you.  Speak with the person who supplied the mask and consider trying other masks. Insurances will allow you to try different masks during the first month of starting CPAP.  Insurance also covers a new mask, hose and filter about every 6 months.    Use a saline nasal spray to ease mild nasal congestion. Neti-Pot or saline nasal rinses may also help. Nasal gel sprays can help reduce nasal dryness.  Biotene mouthwash can be helpful to protect your teeth if you experience frequent dry mouth.  Dry mouth may be a sign of air escaping out of your mouth or out of the mask in the case of a full face mask.  Speak with your provider if you expect that is the case.     Take a nasal decongestant to relieve more severe nasal or sinus congestion.  Do not use Afrin (oxymetazoline) nasal spray more than 3 days in a row.  Speak with your sleep doctor if your nasal congestion is chronic.    Use a heated humidifier that fits your CPAP model to enhance your breathing comfort. Adjust the heat setting up if you get a dry  nose or throat, down if you get condensation in the hose or mask.  Position the CPAP lower than you so that any condensation in the hose drains back into the machine rather than towards the mask.    Try a system that uses nasal pillows if traditional masks give you problems.    Clean your mask, tubing and headgear once a week. Make sure the equipment dries fully.    Regularly check and replace the filters for your CPAP unit and humidifier.    Work closely with your sleep provider and your CPAP supplier to make sure that you have the machine, mask and air pressure setting that works best for you. It is better to stop using it and call your provider to solve problems than to lay awake all night frustrated with the device.      BESIDES CPAP, WHAT OTHER THERAPIES ARE THERE?        Positioning Device  Positioning devices are generally used when sleep apnea is mild and only occurs on your back.This example shows a pillow that straps around the waist. It may be appropriate for those whose sleep study shows milder sleep apnea that occurs primarily when lying flat on one's back. Preliminary studies have shown benefit but effectiveness at home may need to be verified by a home sleep test. These devices are generally not covered by medical insurance.                        Oral Appliance  What is oral appliance therapy?  An oral appliance is a small acrylic device that fits over the upper and lower teeth or tongue (similar to an orthodontic retainer or a mouth guard). This device slightly advances the lower jaw or tongue, which moves the base of the tongue forward, opens the airway, improves breathing and can effectively treat snoring and obstructive sleep apnea sleep apnea. The appliance is fabricated and customized by a qualified dentist with experience in treating snoring and sleep apnea. Oral appliances are usually well tolerated and have relatively high compliance by patients1, 2, 3.  When is an oral appliance  indicated?  Oral appliance therapy is recommended as a first-line treatment for patients with primary snoring, mild sleep apnea, and for patients with moderate sleep apnea who prefer appliance therapy to use of CPAP4, 5. Severity of sleep apnea is determined by sleep testing and is based on the number of respiratory events per hour of sleep.   How successful is oral appliance therapy?  The success rate of oral appliance therapy in patients with mild sleep apnea is 75-80% while in patients with moderate sleep apnea it is 50-70%. The chance of success in patients with severe sleep apnea is 40-50%. The research also shows that oral appliances have a beneficial effect on the cardiovascular health of ANABEL patients at the same magnitude as CPAP therapy7.  Oral appliances should be a second-line treatment in cases of severe sleep apnea, but if not completely successful then a combination therapy utilizing CPAP plus oral appliance therapy may be effective. Oral appliances tend to be effective in a broad range of patients although studies show that the patients who have the highest success are females, younger patients, those with milder disease, and less severe obesity. 3, 6.   The chances of success are lower in patients who have more severe ANABEL, are older, and those who are morbidly obese.     Example of an oral appliance   Finding a dentist that practices dental sleep medicine  Specific training is available through the American Academy of Dental Sleep Medicine for dentists interested in working in the field of sleep. To find a dentist who is educated in the field of sleep and the use of oral appliances, near you, visit the Web site of the American Academy of Dental Sleep Medicine; also see   http://www.accpstorage.org/newOrganization/patients/oralAppliances.pdf  To search for a dentist certified in these practices:  Http://aadsm.org/FindADentist.aspx?1  1. Isidoro et al. Objectively measured vs self-reported  compliance during oral appliance therapy for sleep-disordered breathing. Chest 2013; 144(5): 8689-2910.  2. Rita, et al. Objective measurement of compliance during oral appliance therapy for sleep-disordered breathing. Thorax 2013; 68(1): 91-96.  3. Velasquez et al. Mandibular advancement devices in 620 men and women with ANABEL and snoring: tolerability and predictors of treatment success. Chest 2004; 125: 2980-0113.  4. Analy et al. Oral appliances for snoring and ANABEL: a review. Sleep 2006; 29: 244-262.  5. Gretchen et al. Oral appliance treatment for ANABEL: an update. J Clin Sleep Med 2014; 10(2): 215-227.  6. Latesha et al. Predictors of OSAH treatment outcome. J Dent Res 2007; 86: 8023-8214.      Weight Loss:    Weight management is a personal decision.  If you are interested in exploring weight loss strategies, the following discussion covers the impact on weight loss on sleep apnea and the approaches that may be successful.    Weight loss decreases severity of sleep apnea in most people with obesity. For those with mild obesity who have developed snoring with weight gain, even 15-30 pound weight loss can improve and occasionally eliminate sleep apnea.  Structured and life-long dietary and health habits are necessary to lose weight and keep healthier weight levels.     Though there may be significant health benefits from weight loss, long-term weight loss is very difficult to achieve- studies show success with dietary management in less than 10% of people. In addition, substantial weight loss may require years of dietary control and may be difficult if patients have severe obesity. In these cases, surgical management may be considered.  Finally, older individuals who have tolerated obesity without health complications may be less likely to benefit from weight loss strategies.    Your BMI is Body mass index is 33.18 kg/(m^2).  Body mass index (BMI) is one way to tell whether you are at a healthy  weight, overweight, or obese. It measures your weight in relation to your height.  A BMI of 18.5 to 24.9 is in the healthy range. A person with a BMI of 25 to 29.9 is considered overweight, and someone with a BMI of 30 or greater is considered obese. More than two-thirds of American adults are considered overweight or obese.  Being overweight or obese increases the risk for further weight gain. Excess weight may lead to heart disease and diabetes.  Creating and following plans for healthy eating and physical activity may help you improve your health.  Weight control is part of healthy lifestyle and includes exercise, emotional health, and healthy eating habits. Careful eating habits lifelong are the mainstay of weight control. Though there are significant health benefits from weight loss, long-term weight loss with diet alone may be very difficult to achieve- studies show long-term success with dietary management in less than 10% of people. Attaining a healthy weight may be especially difficult to achieve in those with severe obesity. In some cases, medications, devices and surgical management might be considered.  What can you do?  If you are overweight or obese and are interested in methods for weight loss, you should discuss this with your provider.     Consider reducing daily calorie intake by 500 calories.     Keep a food journal.     Avoiding skipping meals, consider cutting portions instead.    Diet combined with exercise helps maintain muscle while optimizing fat loss. Strength training is particularly important for building and maintaining muscle mass. Exercise helps reduce stress, increase energy, and improves fitness. Increasing exercise without diet control, however, may not burn enough calories to loose weight.       Start walking three days a week 10-20 minutes at a time    Work towards walking thirty minutes five days a week     Eventually, increase the speed of your walking for 1-2 minutes at  time    In addition, we recommend that you review healthy lifestyles and methods for weight loss available through the National Institutes of Health patient information sites:  http://win.niddk.nih.gov/publications/index.htm    And look into health and wellness programs that may be available through your health insurance provider, employer, local community center, or cody club.    Weight management plan: Discussed healthy diet and exercise guidelines and patient will follow up in 3 months in clinic to re-evaluate.  Surgery:    Upper Airway Surgery for ANABEL  Surgery for ANABEL is a second-line treatment option in the management of sleep apnea.  Surgery should be considered for patients who are having a difficult time tolerating CPAP.    Surgery for ANABEL is directed at areas that are responsible for narrowing or complete obstruction of the airway during sleep.  There are a wide range of procedures available to enlarge and/or stabilize the airway to prevent blockage of breathing in the three major areas where it can occur: the palate, tongue, and nasal regions.  Successful surgical treatment depends on the accurate identification of the factors responsible for obstructive sleep apnea in each person.  A personalized approach is required because there is no single treatment that works well for everyone.  Because of anatomic variation, consultation with an examination by a sleep surgeon is a critical first step in determining what surgical options are best for each patient.  In some cases, examination during sedation may be recommended in order to guide the selection of procedures.  Patients will be counseled about risks and benefits as well as the typical recovery course after surgery. Surgery is typically not a cure for a person s ANABEL.  However, surgery will often significantly improve one s ANABEL severity (termed  success rate ).  Even in the absence of a cure, surgery will decrease the cardiovascular risk associated with  OSA7; improve overall quality of life8 (sleepiness, functionality, sleep quality, etc).          Palate Procedures:  Patients with ANABEL often have narrowing of their airway in the region of their tonsils and uvula.  The goals of palate procedures are to widen the airway in this region as well as to help the tissues resist collapse.  Modern palate procedure techniques focus on tissue conservation and soft tissue rearrangement, rather than tissue removal.  Often the uvula is preserved in this procedure. Residual sleep apnea is common in patient after pharyngoplasty with an average reduction in sleep apnea events of 33%2.      Tongue Procedures:  While patients are awake, the muscles that surround the throat are active and keep this region open for breathing. These muscles relax during sleep, allowing the tongue and other structures to collapse and block breathing.  There are several different tongue procedures available.  Selection of a tongue base procedure depends on characteristics seen on physical exam.  Generally, procedures are aimed at removing bulky tissues in this area or preventing the back of the tongue from falling back during sleep.  Success rates for tongue surgery range from 50-62%3.    Hypoglossal Nerve Stimulation:  Hypoglossal nerve stimulation has recently received approval from the [place] Food and Drug Administration for the treatment of obstructive sleep apnea.  This is based on research showing that the system was safe and effective in treating sleep apnea6.  Results showed that the median AHI score decreased 68%, from 29.3 to 9.0. This therapy uses an implant system that senses breathing patterns and delivers mild stimulation to airway muscles, which keeps the airway open during sleep.  The system consists of three fully implanted components: a small generator (similar in size to a pacemaker), a breathing sensor, and a stimulation lead.  Using a small handheld remote, a patient turns the  therapy on before bed and off upon awakening.    Candidates for this device must be greater than 22 years of age, have moderate to severe ANABEL (AHI between 20-65), BMI less than 32, have tried CPAP/oral appliance without success, and have appropriate upper airway anatomy (determined by a sleep endoscopy performed by Dr. Bob).    Hypoglossal Nerve Stimulation Pathway:    The sleep surgeon s office will work with the patient through the insurance prior-authorization process (including communications and appeals).    Nasal Procedures:  Nasal obstruction can interfere with nasal breathing during the day and night.  Studies have shown that relief of nasal obstruction can improve the ability of some patients to tolerate positive airway pressure therapy for obstructive sleep apnea1.  Treatment options include medications such as nasal saline, topical corticosteroid and antihistamine sprays, and oral medications such as antihistamines or decongestants. Non-surgical treatments can include external nasal dilators for selected patients. If these are not successful by themselves, surgery can improve the nasal airway either alone or in combination with these other options.    Combination Procedures:  Combination of surgical procedures and other treatments may be recommended, particularly if patients have more than one area of narrowing or persistent positional disease.  The success rate of combination surgery ranges from 66-80%2,3.      1. Júnior PRICE. The Role of the Nose in Snoring and Obstructive Sleep Apnoea: An Update.  Eur Arch Otorhinolaryngol. 2011; 268: 1365-73.  2.  Thomas SM; Alisha JA; Anthony JR; Pallanch JF; Salvador MB; Jennifer SG; Swapna AUGUSTIN. Surgical modifications of the upper airway for obstructive sleep apnea in adults: a systematic review and meta-analysis. SLEEP 2010;33(10):4148-3731. Lamberto CANAS. Hypopharyngeal surgery in obstructive sleep apnea: an evidence-based medicine review.  Arch Otolaryngol Head Neck  Surg. 2006 Feb;132(2):206-13.  3. Gavin YH1, Randi Y, Rigo OTILIO. The efficacy of anatomically based multilevel surgery for obstructive sleep apnea. Otolaryngol Head Neck Surg. 2003 Oct;129(4):327-35.  4. Kezirian E, Goldberg A. Hypopharyngeal Surgery in Obstructive Sleep Apnea: An Evidence-Based Medicine Review. Arch Otolaryngol Head Neck Surg. 2006 Feb;132(2):206-13.  5. Fide VUONG et al. Upper-Airway Stimulation for Obstructive Sleep Apnea.  N Engl J Med. 2014 Jan 9;370(2):139-49.  6. Neetu Y et al. Increased Incidence of Cardiovascular Disease in Middle-aged Men with Obstructive Sleep Apnea. Am J Respir Crit Care Med; 2002 166: 159-165  7. Yana MERCADO et al. Studying Life Effects and Effectiveness of Palatopharyngoplasty (SLEEP) study: Subjective Outcomes of Isolated Uvulopalatopharyngoplasty. Otolaryngol Head Neck Surg. 2011; 144: 623-631.  Meet with the nurse and schedule sleep study and follow up visit. If the study is negative, we'll discuss the next step during the follow up visit. You may use melatonin during the night of the study.    Thank you!    Frank Mendoza MD, MPH  Clinical Sleep and Occupational / Environmental Medicine

## 2017-08-02 NOTE — PROGRESS NOTES
"[facility]  Outpatient Sleep Medicine Consultation  August 2, 2017        Name: Karthik Bird MRN# [id number]   Age: 52 year old YOB: 1965       Date of Consultation: August 2, 2017  Consultation is requested by: Tina Ramirez CNP  [address] [address]  Primary care provider: Tina Ramirez    Patient is accompanied by: Patient presents alone today       Reason for Sleep Consult:     Karthik Bird is a 52 year old male patient that presents here for an initial evaluation of \"fatigue and constant tiredness.\"         Assessment and Plan:     Summary Sleep Diagnoses:    (1) EDS  (2) Fatigue  (3) Obesity  (4) Hypnopompic Hallucinations    Summary Recommendations:    The patient describes severe EDS and fatigue that started many years ago. He underwent an evaluation by his PCP with some preliminary blood studies showing no abnormalities. The patient denies any snoring, gasping / choking for air, or witnessed apneas, but he still has an intermediate pre-test probability of ANABEL with a StopBang score of 4 / 8. Although the patient is endorses some component that may suggest a hypersomnolence disorder with hypnopompic hallucinations, these may also be caused by REM disruption secondary to ANABEL. As such, after extensive discussion with the patient and since he does not have any contraindications, we determine that he will undergo an initial evaluation with a portable HST sleep study. Based on the patient's history and physical examination, there is low suspicion of hypoventilation and, therefore, no TCM monitoring would be necessary. Today, the nature and pathophysiology of ANABEL were discussed. The different treatment options for ANABEL were also reviewed and explained today. Lifestyle recommendations including healthy dietary and exercising habits were discussed. Pt will follow up with me after completing portable HST sleep study. If no " ANABEL is present, the patient may undergo a hypersomnolence testing with sleep diary, actigraphy, urine drug screen, PSG and MSLT. This was also carefully discussed with and explained to the patient.    Coding:  (G47.19) Excessive daytime sleepiness  (primary encounter diagnosis)  (R53.83) Fatigue, unspecified type  (R53.83) Other fatigue  (E66.09) Non morbid obesity due to excess calories  (R44.1) Sleep-related hallucinations    Counseling included a comprehensive review of diagnostic and therapeutic strategies as well as risks of inadequate therapy.    Educational materials provided in instructions. The patient was instructed to avoid driving or operating any heavy machinery when experiencing drowsiness.    All questions and concerns were addressed today. Pt agrees and understands the assessment and plan.         History of Present Illness:     Karthik Bird is a 52 year old  LHD male pt with history of chronic migraine headache and ED presents for an initial evaluation today due to EDS and unrestorative sleep. Pt referred to us due to fatigue by PCP (I was asked to see and evaluate the patient by the PCP due to fatigue).  Recent lab studies including lyme serum studies, vitamin B12 levels, CMP, CBC, and TSH were all within normal range (no vitamin D values obtained).    Pt reports EDS, unrestorative sleep, and sleep inertia for over 30 yrs. He also reports some drowsiness when driving. Pt also endorses some inadvertent naps at his desk (after an inadvertent nap, the patient feels more tired - sleep is nonrestorative). The patient admits having some hypnopompic hallucinations. For instance, one time he saw fire in the bedroom. These events only happen once or twice a month. Pt denies any gasping / choking for air, snoring, or witnessed apneas. Pt denies any nocturia, GERD sxs, xerostomia, CP, SOB, peripheral edema, fever, cough, or any other sxs or concerns with negative ROS.    PREVIOUS IN- LAB or HOME  SLEEP STUDIES: None Reported    SLEEP-WAKE SCHEDULE:     Karthik Bird      -Describes himself as neither a morning or night person; prefers to go to sleep at 11:30 PM and wakes up at 8:30 AM.      -Pt does not take naps during the week; takes some inadvertent naps.      -ON WEEKDAYS, goes to sleep at 11:30 PM during the week; awakens 6:30 AM with an alarm; falls asleep in 15 minutes; denies difficulty falling asleep.      -ON WEEKENDS, goes to sleep at 11:30 PM and wakes up at 8:30 AM with an alarm; falls asleep in 15 minutes.        -Awakens 1-2 times a night for 10 minutes before falling back to sleep; awakens to go to the bathroom.      BEDTIME ACTIVITIES AND SHIFT WORK:    Karthik Bird     -Bedtime Activities and Other Sleeping Information: Sleeps with wife on a lindsay size bed. The bedroom is dark and cool. Pt sleeps on sides. Pt does use TV in bed. No pets in the bedroom.      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    -Working Hours: 730 AM to 430 PM     SCALES        SLEEP APNEA: Stopbang score: 4 / 8        SLEEPINESS: [place] sleepiness scale (ESS):  15 / 24    Drowsy driving / near accidents: Some drowsiness when no near accidents    Consequences: Falling asleep at work    SLEEP COMPLAINTS:  Cardio-respiratory     -Snoring: No snoring reported    -Dyspnea: Pt denies having any witnessed apneas or dyspnea   -Morning headaches or confusion: Denies any morning cephalgia   -Coexisting Lung disease: Denies diagnosed or known lung disease at this time     -Coexisting Heart disease: Denies diagnosed or known cardiovascular disease at this time     -Does patient have a bed partner: Patient sleeps with spouse   -Has bed partner been sleeping separately because of snoring:  No            RLS Screen:      -When you try to relax in the evening or sleep at night, do you ever have unpleasant, restless feelings in your legs that can be relieved by walking or movement? None Reported     -Periodic  limb movement: None Reported    Narcolepsy:     - Denies sudden urges of sleep attacks   - Denies cataplexy   - Denies sleep paralysis    - Admits hallucinations. Pt explains that sometimes, when waking up, he sees fire and other things that are not really happening or there in the bedroom.     Sleep Behaviors:   - Denies leg symptoms/movements   - Denies motor restlessness   - Denies night terrors   - Admits bruxism   - Denies automatic behaviors    Other Subjective Complaints:   - Denies anxiety or rumination    - Denies pain and discomfort at  night   - Denies waking up with heart pounding or racing   - Denies GERD or aspiration         Parasomnia:    -NREM - Admits recurrent persistent confusional arousal, night eating, sleep walking or sleep terrors. Pt used to sleep walking several years ago, but this improved. He has never injured self or others.      -REM  - Denies dream enactment or injuries.     -Driving Accident or Near Accidents: Some drowsiness when driving         Medications:     Current Outpatient Prescriptions   Medication Sig     order for DME Equipment being ordered: heel cups     SUMAtriptan (IMITREX) 25 MG tablet Take 1-2 tablets (25-50 mg) by mouth at onset of headache May repeat in 1-2 hours after onset.     No current facility-administered medications for this visit.         Allergies   Allergen Reactions     Nka [No Known Allergies]           Past Medical History:     Denies needing any 02 supplement at night.    Past Medical History:   Diagnosis Date     Migraine      Seasonal allergies            Past Surgical History:    Denies previous upper airway surgery.     Past Surgical History:   Procedure Laterality Date     APPENDECTOMY      at 14 yo     COLONOSCOPY       GENITOURINARY SURGERY      previous vasectomy 2/2014     VASECTOMY  5/30/2014    Procedure: VASECTOMY;  Surgeon: Zaida Slaughter MD;  Location: [facility] OR            Social History:     Social History   Substance Use Topics      Smoking status: Never Smoker     Smokeless tobacco: Never Used     Alcohol use Yes      Comment: twice a week     Chemical History:     Tobacco: Never smoked     Uses 3 to 5 cups/day of coffee. Last caffeine intake is usually before 630 PM.    Supplements for wakefulness: Patient uses Redbull (one a day)    EtOH: 1 to 2 drinks a week  Recreational Drugs: Patient denies using any recreational drugs     Psych Hx:   PHQ2: Positive   -Little Interest or pleasure in doing things? 1 - several days   -Feeling down, depressed, or hopeless? 1 - several days    Current dangers to self or others: No. Pt denies any SI / HI, hallucinations, or delusions         Family History:     Family History   Problem Relation Age of Onset     CANCER Mother      lung- uterine     Liver Disease Father      Alzheimer Disease Maternal Grandmother      99     Pneumonia Maternal Grandfather      99        Sleep Family Hx:       RLS- None reported   ANABEL - None reported  Insomnia - None reported  Parasomnia - None reported         Review of Systems:     A complete 10 point review of systems was negative other than HPI or as commented below:     CONSTITUTIONAL: NEGATIVE for weight gain/loss, fever, chills, sweats or night sweats, drug allergies.  EYES: NEGATIVE for changes in vision, blind spots, double vision.  ENT: NEGATIVE for ear pain, sore throat, sinus pain, post-nasal drip, runny nose, bloody nose  CARDIAC: NEGATIVE for fast heartbeats or fluttering in chest, chest pain or pressure, breathlessness when lying flat, swollen legs or swollen feet.  NEUROLOGIC: NEGATIVE headaches, weakness or numbness in the arms or legs.  DERMATOLOGIC: NEGATIVE for rashes, new moles or change in mole(s)  PULMONARY: NEGATIVE SOB at rest, SOB with activity, dry cough, productive cough, coughing up blood, wheezing or whistling when breathing.    GASTROINTESTINAL: NEGATIVE for nausea or vomitting, loose or watery stools, fat or grease in stools, constipation,  "abdominal pain, bowel movements black in color or blood noted.  GENITOURINARY: NEGATIVE for pain during urination, blood in urine, urinating more frequently than usual, irregular menstrual periods.  MUSCULOSKELETAL: NEGATIVE for muscle pain, bone or joint pain, swollen joints.  ENDOCRINE: NEGATIVE for increased thirst or urination, diabetes.  LYMPHATIC: NEGATIVE for swollen lymph nodes, lumps or bumps in the breasts or nipple discharge.         Physical Examination:   /75  Pulse 78  Ht 1.88 m (6' 2\")  Wt 117.2 kg (258 lb 6.4 oz)  SpO2 95%  BMI 33.18 kg/m2     VS: Reviewed and normal.  General: Alert, oriented, not in distress. Dressed casually; Good eye contact; Comfortably sitting in a chair; in no apparent distress  HEENT: Normocephalic and atraumatic; NL TM x 2; pupils are isocoric and equally responsive to the light. PERRLA. EOMI. Normal fundoscopic examination; Nasal turbinates are normal with a normal septal alignment;  Mallampati score: Grade III; Tonsillar hypertrophy: 2  visible at pillars; Pharynx with no erythema or exudates.  NECK: neck supple; symmetrical; no lymphadenopathy; no thyromegaly, bruit, JVD noted. Neck circumference of 17.25 inches (44 cm).  Lungs: both hemithoraces are symmetrical, normal to palpation, no dullness to percussion, auscultation of lungs revealed normal breath sounds with no expirium prolongation, wheezing, rhonci and crackles.  CVS: Normal S1 and S2 heart sounds with no extra heart sounds. No murmur, rubs, or clicks. Normal peripheral pulses throughout with no obvious peripheral edema.  Abdomen: Bowel sounds present. Abdomen is soft, non-tender, and non-distended. No organomegaly, ascitis, or obvious masses noted. Negative CVA tenderness.  Extremities/musculoskeletal: no peripheral edema, deformity, cyanosis, clubbing  Neurology: awake, alert, and oriented x 3. No obvious gross motor / sensorial deficits with normal strength in all extremities at 5/5 and normal " sensation throughout. Cranial nerves are grossly intact with normal II to XII CN functions. Negative Romberg's test with normal gait. DTR are symmetric and normal at 2+/4.  Integumentary: no obvious skin rash.  Psychiatry: Mood and affect are appropriate. Euthymic with affect congruent with full range and intensity. No SI/HI with adequate insight and judgement.          Data: All pertinent previous laboratory data reviewed     No results found for: PH, PHARTERIAL, PO2, [id number], SAT, PCO2, HCO3, BASEEXCESS, IGNACIO, BEB  Lab Results   Component Value Date    TSH 1.29 07/21/2017    TSH 1.31 01/16/2015     Lab Results   Component Value Date    GLC 73 07/21/2017    GLC 83 01/16/2015     Lab Results   Component Value Date    HGB 16.4 07/21/2017     Lab Results   Component Value Date    BUN 17 07/21/2017    BUN 14 01/16/2015    CR 0.90 07/21/2017    CR 1.02 01/16/2015       Echocardiology: None Available    Chest x-ray: None Available    PFT: None Available    Laboratory Studies:     Component Value Flag Ref Range Units Status Collected Lab   TSH 1.29  0.40 - 4.00 mU/L Final 07/21/2017 10:38 AM 59     Component Value Flag Ref Range Units Status Collected Lab   WBC 6.4  4.0 - 11.0 10e9/L Final 07/21/2017 10:38 AM PI   RBC Count 5.54  4.4 - 5.9 10e12/L Final 07/21/2017 10:38 AM PI   Hemoglobin 16.4  13.3 - 17.7 g/dL Final 07/21/2017 10:38 AM PI   Hematocrit 46.1  40.0 - 53.0 % Final 07/21/2017 10:38 AM PI   MCV 83  78 - 100 fl Final 07/21/2017 10:38 AM PI   MCH 29.6  26.5 - 33.0 pg Final 07/21/2017 10:38 AM PI   MCHC 35.6  31.5 - 36.5 g/dL Final 07/21/2017 10:38 AM PI   RDW 13.2  10.0 - 15.0 % Final 07/21/2017 10:38 AM PI   Platelet Count 249  150 - 450 10e9/L Final 07/21/2017 10:38 AM PI   Diff Method     Final 07/21/2017 10:38 AM PI   Automated Method   % Neutrophils 62.1   % Final 07/21/2017 10:38 AM PI   % Lymphocytes 23.3   % Final 07/21/2017 10:38 AM PI   % Monocytes 11.0   % Final 07/21/2017 10:38 AM PI   %  Eosinophils 3.0   % Final 07/21/2017 10:38 AM PI   % Basophils 0.6   % Final 07/21/2017 10:38 AM PI   Absolute Neutrophil 4.0  1.6 - 8.3 10e9/L Final 07/21/2017 10:38 AM PI   Absolute Lymphocytes 1.5  0.8 - 5.3 10e9/L Final 07/21/2017 10:38 AM PI   Absolute Monocytes 0.7  0.0 - 1.3 10e9/L Final 07/21/2017 10:38 AM PI   Absolute Eosinophils 0.2  0.0 - 0.7 10e9/L Final 07/21/2017 10:38 AM PI   Absolute Basophils 0.0  0.0 - 0.2 10e9/L Final 07/21/2017 10:38 AM PI     Component Value Flag Ref Range Units Status Collected Lab   Sodium 139  133 - 144 mmol/L Final 07/21/2017 10:38 AM 59   Potassium 4.1  3.4 - 5.3 mmol/L Final 07/21/2017 10:38 AM 59   Chloride 104  94 - 109 mmol/L Final 07/21/2017 10:38 AM 59   Carbon Dioxide 28  20 - 32 mmol/L Final 07/21/2017 10:38 AM 59   Anion Gap 7  3 - 14 mmol/L Final 07/21/2017 10:38 AM 59   Glucose 73  70 - 99 mg/dL Final 07/21/2017 10:38 AM 59   Urea Nitrogen 17  7 - 30 mg/dL Final 07/21/2017 10:38 AM 59   Creatinine 0.90  0.66 - 1.25 mg/dL Final 07/21/2017 10:38 AM 59   GFR Estimate 88  >60 mL/min/1.7m2 Final 07/21/2017 10:38 AM 59   Comment:   Non  GFR Calc   GFR Estimate If Black   >60 mL/min/1.7m2 Final 07/21/2017 10:38 AM 59   >90    GFR Calc      Calcium 8.7  8.5 - 10.1 mg/dL Final 07/21/2017 10:38 AM 59   Bilirubin Total 0.3  0.2 - 1.3 mg/dL Final 07/21/2017 10:38 AM 59   Albumin 3.9  3.4 - 5.0 g/dL Final 07/21/2017 10:38 AM 59   Protein Total 7.1  6.8 - 8.8 g/dL Final 07/21/2017 10:38 AM 59   Alkaline Phosphatase 66  40 - 150 U/L Final 07/21/2017 10:38 AM 59   ALT 30  0 - 70 U/L Final 07/21/2017 10:38 AM 59   AST 26  0 - 45 U/L Final 07/21/2017 10:38 AM 59     Component Value Flag Ref Range Units Status Collected Lab   Vitamin B12 528  193 - 986 pg/mL Final 07/21/2017 10:38 AM 51     Component Value Flag Ref Range Units Status Collected Lab   Lyme Disease Antibodies Serum 0.38  0.00 - 0.89  Final 07/26/2017  2:00 PM      Component Value Flag  Ref Range Units Status Collected Lab   Testosterone Total 505  240 - 950 ng/dL Final 01/16/2015  9:20 AM 51     Frank Mendoza MD, MPH  Clinical Sleep Medicine    [facility] [address], [place], [place] [address] [phone] Clinic    Total time spent with patient: 60 min. Over >50% of the time was spent for face to face counseling, education, and evaluation.

## 2017-08-02 NOTE — NURSING NOTE
"Chief Complaint   Patient presents with     Sleep Problem     Consult; was referred by Tina Ramirez in [place]. C/O daytime fatigue       Initial /75  Pulse 78  Ht 1.88 m (6' 2\")  Wt 117.2 kg (258 lb 6.4 oz)  SpO2 95%  BMI 33.18 kg/m2 Estimated body mass index is 33.18 kg/(m^2) as calculated from the following:    Height as of this encounter: 1.88 m (6' 2\").    Weight as of this encounter: 117.2 kg (258 lb 6.4 oz).  Medication Reconciliation: complete   "

## 2017-08-02 NOTE — MR AVS SNAPSHOT
After Visit Summary   8/2/2017    Karthik Bird    MRN: [id number]           Patient Information     Date Of Birth          1965        Visit Information        Provider Department      8/2/2017 10:30 AM Frank Mendoza MD [facility]        Today's Diagnoses     Excessive daytime sleepiness    -  1    Fatigue, unspecified type        Other fatigue        Non morbid obesity due to excess calories        Sleep-related hallucinations          Care Instructions      Your BMI is Body mass index is 33.18 kg/(m^2).  Weight management is a personal decision.  If you are interested in exploring weight loss strategies, the following discussion covers the approaches that may be successful. Body mass index (BMI) is one way to tell whether you are at a healthy weight, overweight, or obese. It measures your weight in relation to your height.  A BMI of 18.5 to 24.9 is in the healthy range. A person with a BMI of 25 to 29.9 is considered overweight, and someone with a BMI of 30 or greater is considered obese. More than two-thirds of American adults are considered overweight or obese.  Being overweight or obese increases the risk for further weight gain. Excess weight may lead to heart disease and diabetes.  Creating and following plans for healthy eating and physical activity may help you improve your health.  Weight control is part of healthy lifestyle and includes exercise, emotional health, and healthy eating habits. Careful eating habits lifelong are the mainstay of weight control. Though there are significant health benefits from weight loss, long-term weight loss with diet alone may be very difficult to achieve- studies show long-term success with dietary management in less than 10% of people. Attaining a healthy weight may be especially difficult to achieve in those with severe obesity. In some cases, medications, devices and surgical management might be considered.  What can you do?  If  "you are overweight or obese and are interested in methods for weight loss, you should discuss this with your provider.     Consider reducing daily calorie intake by 500 calories.     Keep a food journal.     Avoiding skipping meals, consider cutting portions instead.    Diet combined with exercise helps maintain muscle while optimizing fat loss. Strength training is particularly important for building and maintaining muscle mass. Exercise helps reduce stress, increase energy, and improves fitness. Increasing exercise without diet control, however, may not burn enough calories to loose weight.       Start walking three days a week 10-20 minutes at a time    Work towards walking thirty minutes five days a week     Eventually, increase the speed of your walking for 1-2 minutes at time    In addition, we recommend that you review healthy lifestyles and methods for weight loss available through the National Institutes of Health patient information sites:  http://win.niddk.nih.gov/publications/index.htm    And look into health and wellness programs that may be available through your health insurance provider, employer, local community center, or cody club.    Weight management plan: Patient was referred to their PCP to discuss a diet and exercise plan.     MY TREATMENT INFORMATION FOR SLEEP APNEA -  Karthik Bird    DOCTOR: Frank Mendoza MD, MPH  SLEEP CENTER : [facility]         If I haven't had a sleep study yet, what can I expect?  A personal story from Artie  https://www.youEquityMetrixube.com/watch?v=AxPLmlRpnCs      Am I having a home sleep study?  Here is a video in case you get home and want to make sure you have done it correctly  https://www.youEquityMetrixube.com/watch?v=RZY0T4oSdl9&feature=youtu.be      Frequently asked questions:  1. What is Obstructive Sleep Apnea (ANABEL)? ANABEL is the most common type of sleep apnea. Apnea literally means, \"without breath.\" It is characterized by repetitive pauses in " breathing, despite continued effort to breathe, and is usually associated with a reduction in blood oxygen saturation. Apneas can last 10 to over 60 seconds. It is caused by narrowing or collapse of the upper airway as muscles relax during sleep. Severity of sleep apnea is determined by frequency of breathing events and their effect on your sleep and oxygen levels determined during sleep testing.     2. What are the consequences of ANABEL? Symptoms include: daytime sleepiness- possibly increasing the risk of falling asleep while driving, unrefreshing/restless sleep, snoring, insomnia, waking frequently to urinate, waking with heartburn or reflux, reduced concentration and memory, and morning headaches. Other health consequences may include development of high blood pressure and other cardiovascular disease in persons who are susceptible. Untreated ANABEL  can contribute to heart disease, stroke and diabetes.     3. What are the treatment options? In most situations, sleep apnea is a lifelong disease that must be managed with daily therapy. Medications are not effective for sleep apnea and surgery is generally not performed until other therapies have been tried. Therapy is usually tailored to the individual patient based on many factors including your wishes as well as severity of sleep apnea and severity of obesity. Continuous Positive Airway (CPAP) is the most reliable treatment. An oral device to hold your jaw forward is usually the next most reliable option. Other options include postioning devices (to keep you off your back), weight loss, and surgery including a tongue pacing device. There is more detail about some of these options below.            1. CPAP-  WHAT DOES IT DO AND HOW CAN I LEARN TO WEAR IT?                               BEFORE I START, CAN I WATCH A MOVIE TO GET A PLAN ON HOW TO USE CPAP?  https://www.[place] Everywhere.com/watch?q=l5O15um088S      Continuous positive airway pressure, or CPAP, is the most  effective treatment for obstructive sleep apnea. It works by blowing room air, through a mask, to hold your throat open. A decision to use CPAP is a major step forward in the pursuit of a healthier life. The successful use of CPAP will help you breathe easier, sleep better and live healthier. You can choose CPAP equipment from any durable medical equipment provider that meets your needs.  Using CPAP can be a positive experience if you keep these armijo points in mind:  1. Commitment  CPAP is not a quick fix for your problem. It involves a long-term commitment to improve your sleep and your health.    2. Communication  Stay in close communication with both your sleep doctor and your CPAP supplier. Ask lots of questions and seek help when you need it.    3. Consistency  Use CPAP all night, every night and for every nap. You will receive the maximum health benefits from CPAP when you use it every time that you sleep. This will also make it easier for your body to adjust to the treatment.    4. Correction  The first machine and mask that you try may not be the best ones for you. Work with your sleep doctor and your CPAP supplier to make corrections to your equipment selection. Ask about trying a different type of machine or mask if you have ongoing problems. Make sure that your mask is a good fit and learn to use your equipment properly.    5. Challenge  Tell a family member or close friend to ask you each morning if you used your CPAP the previous night. Have someone to challenge you to give it your best effort.    6. Connection   Your adjustment to CPAP will be easier if you are able to connect with others who use the same treatment. Ask your sleep doctor if there is a support group in your area for people who have sleep apnea, or look for one on the Internet.  7. Comfort   Increase your level of comfort by using a saline spray, decongestant or heated humidifier if CPAP irritates your nose, mouth or throat. Use your  "unit's \"ramp\" setting to slowly get used to the air pressure level. There may be soft pads you can buy that will fit over your mask straps. Look on www.CPAP.com for accessories that can help make CPAP use more comfortable.  8. Cleaning   Clean your mask, tubing and headgear on a regular basis. Put this time in your schedule so that you don't forget to do it. Check and replace the filters for your CPAP unit and humidifier.    9. Completion   Although you are never finished with CPAP therapy, you should reward yourself by celebrating the completion of your first month of treatment. Expect this first month to be your hardest period of adjustment. It will involve some trial and error as you find the machine, mask and pressure settings that are right for you.    10. Continuation  After your first month of treatment, continue to make a daily commitment to use your CPAP all night, every night and for every nap.    CPAP-Tips to starting with success:  Begin using your CPAP for short periods of time during the day while you watch TV or read.    Use CPAP every night and for every nap. Using it less often reduces the health benefits and makes it harder for your body to get used to it.    Make small adjustments to your mask, tubing, straps and headgear until you get the right fit. Tightening the mask may actually worsen the leak.  If it leaves significant marks on your face or irritates the bridge of your nose, it may not be the best mask for you.  Speak with the person who supplied the mask and consider trying other masks. Insurances will allow you to try different masks during the first month of starting CPAP.  Insurance also covers a new mask, hose and filter about every 6 months.    Use a saline nasal spray to ease mild nasal congestion. Neti-Pot or saline nasal rinses may also help. Nasal gel sprays can help reduce nasal dryness.  Biotene mouthwash can be helpful to protect your teeth if you experience frequent dry mouth.  " Dry mouth may be a sign of air escaping out of your mouth or out of the mask in the case of a full face mask.  Speak with your provider if you expect that is the case.     Take a nasal decongestant to relieve more severe nasal or sinus congestion.  Do not use Afrin (oxymetazoline) nasal spray more than 3 days in a row.  Speak with your sleep doctor if your nasal congestion is chronic.    Use a heated humidifier that fits your CPAP model to enhance your breathing comfort. Adjust the heat setting up if you get a dry nose or throat, down if you get condensation in the hose or mask.  Position the CPAP lower than you so that any condensation in the hose drains back into the machine rather than towards the mask.    Try a system that uses nasal pillows if traditional masks give you problems.    Clean your mask, tubing and headgear once a week. Make sure the equipment dries fully.    Regularly check and replace the filters for your CPAP unit and humidifier.    Work closely with your sleep provider and your CPAP supplier to make sure that you have the machine, mask and air pressure setting that works best for you. It is better to stop using it and call your provider to solve problems than to lay awake all night frustrated with the device.      BESIDES CPAP, WHAT OTHER THERAPIES ARE THERE?        Positioning Device  Positioning devices are generally used when sleep apnea is mild and only occurs on your back.This example shows a pillow that straps around the waist. It may be appropriate for those whose sleep study shows milder sleep apnea that occurs primarily when lying flat on one's back. Preliminary studies have shown benefit but effectiveness at home may need to be verified by a home sleep test. These devices are generally not covered by medical insurance.                        Oral Appliance  What is oral appliance therapy?  An oral appliance is a small acrylic device that fits over the upper and lower teeth or tongue  (similar to an orthodontic retainer or a mouth guard). This device slightly advances the lower jaw or tongue, which moves the base of the tongue forward, opens the airway, improves breathing and can effectively treat snoring and obstructive sleep apnea sleep apnea. The appliance is fabricated and customized by a qualified dentist with experience in treating snoring and sleep apnea. Oral appliances are usually well tolerated and have relatively high compliance by patients1, 2, 3.  When is an oral appliance indicated?  Oral appliance therapy is recommended as a first-line treatment for patients with primary snoring, mild sleep apnea, and for patients with moderate sleep apnea who prefer appliance therapy to use of CPAP4, 5. Severity of sleep apnea is determined by sleep testing and is based on the number of respiratory events per hour of sleep.   How successful is oral appliance therapy?  The success rate of oral appliance therapy in patients with mild sleep apnea is 75-80% while in patients with moderate sleep apnea it is 50-70%. The chance of success in patients with severe sleep apnea is 40-50%. The research also shows that oral appliances have a beneficial effect on the cardiovascular health of ANABEL patients at the same magnitude as CPAP therapy7.  Oral appliances should be a second-line treatment in cases of severe sleep apnea, but if not completely successful then a combination therapy utilizing CPAP plus oral appliance therapy may be effective. Oral appliances tend to be effective in a broad range of patients although studies show that the patients who have the highest success are females, younger patients, those with milder disease, and less severe obesity. 3, 6.   The chances of success are lower in patients who have more severe ANABEL, are older, and those who are morbidly obese.     Example of an oral appliance   Finding a dentist that practices dental sleep medicine  Specific training is available through the  American Academy of Dental Sleep Medicine for dentists interested in working in the field of sleep. To find a dentist who is educated in the field of sleep and the use of oral appliances, near you, visit the Web site of the American Academy of Dental Sleep Medicine; also see   http://www.accpstorage.org/newOrganization/patients/oralAppliances.pdf  To search for a dentist certified in these practices:  Http://aadsm.org/FindADentist.aspx?1  1. Isidoro et al. Objectively measured vs self-reported compliance during oral appliance therapy for sleep-disordered breathing. Chest 2013; 144(5): 2337-6079.  2. Rita et al. Objective measurement of compliance during oral appliance therapy for sleep-disordered breathing. Thorax 2013; 68(1): 91-96.  3. Velasquez et al. Mandibular advancement devices in 620 men and women with ANABEL and snoring: tolerability and predictors of treatment success. Chest 2004; 125: 2284-3925.  4. Analy et al. Oral appliances for snoring and ANABEL: a review. Sleep 2006; 29: 244-262.  5. Gretchen, et al. Oral appliance treatment for ANABEL: an update. J Clin Sleep Med 2014; 10(2): 215-227.  6. Latesha et al. Predictors of OSAH treatment outcome. J Dent Res 2007; 86: 2310-5233.      Weight Loss:    Weight management is a personal decision.  If you are interested in exploring weight loss strategies, the following discussion covers the impact on weight loss on sleep apnea and the approaches that may be successful.    Weight loss decreases severity of sleep apnea in most people with obesity. For those with mild obesity who have developed snoring with weight gain, even 15-30 pound weight loss can improve and occasionally eliminate sleep apnea.  Structured and life-long dietary and health habits are necessary to lose weight and keep healthier weight levels.     Though there may be significant health benefits from weight loss, long-term weight loss is very difficult to achieve- studies show success  with dietary management in less than 10% of people. In addition, substantial weight loss may require years of dietary control and may be difficult if patients have severe obesity. In these cases, surgical management may be considered.  Finally, older individuals who have tolerated obesity without health complications may be less likely to benefit from weight loss strategies.    Your BMI is Body mass index is 33.18 kg/(m^2).  Body mass index (BMI) is one way to tell whether you are at a healthy weight, overweight, or obese. It measures your weight in relation to your height.  A BMI of 18.5 to 24.9 is in the healthy range. A person with a BMI of 25 to 29.9 is considered overweight, and someone with a BMI of 30 or greater is considered obese. More than two-thirds of American adults are considered overweight or obese.  Being overweight or obese increases the risk for further weight gain. Excess weight may lead to heart disease and diabetes.  Creating and following plans for healthy eating and physical activity may help you improve your health.  Weight control is part of healthy lifestyle and includes exercise, emotional health, and healthy eating habits. Careful eating habits lifelong are the mainstay of weight control. Though there are significant health benefits from weight loss, long-term weight loss with diet alone may be very difficult to achieve- studies show long-term success with dietary management in less than 10% of people. Attaining a healthy weight may be especially difficult to achieve in those with severe obesity. In some cases, medications, devices and surgical management might be considered.  What can you do?  If you are overweight or obese and are interested in methods for weight loss, you should discuss this with your provider.     Consider reducing daily calorie intake by 500 calories.     Keep a food journal.     Avoiding skipping meals, consider cutting portions instead.    Diet combined with  exercise helps maintain muscle while optimizing fat loss. Strength training is particularly important for building and maintaining muscle mass. Exercise helps reduce stress, increase energy, and improves fitness. Increasing exercise without diet control, however, may not burn enough calories to loose weight.       Start walking three days a week 10-20 minutes at a time    Work towards walking thirty minutes five days a week     Eventually, increase the speed of your walking for 1-2 minutes at time    In addition, we recommend that you review healthy lifestyles and methods for weight loss available through the National Institutes of Health patient information sites:  http://win.niddk.nih.gov/publications/index.htm    And look into health and wellness programs that may be available through your health insurance provider, employer, local community center, or cody club.    Weight management plan: Discussed healthy diet and exercise guidelines and patient will follow up in 3 months in clinic to re-evaluate.  Surgery:    Upper Airway Surgery for ANABEL  Surgery for ANABEL is a second-line treatment option in the management of sleep apnea.  Surgery should be considered for patients who are having a difficult time tolerating CPAP.    Surgery for ANABEL is directed at areas that are responsible for narrowing or complete obstruction of the airway during sleep.  There are a wide range of procedures available to enlarge and/or stabilize the airway to prevent blockage of breathing in the three major areas where it can occur: the palate, tongue, and nasal regions.  Successful surgical treatment depends on the accurate identification of the factors responsible for obstructive sleep apnea in each person.  A personalized approach is required because there is no single treatment that works well for everyone.  Because of anatomic variation, consultation with an examination by a sleep surgeon is a critical first step in determining what  surgical options are best for each patient.  In some cases, examination during sedation may be recommended in order to guide the selection of procedures.  Patients will be counseled about risks and benefits as well as the typical recovery course after surgery. Surgery is typically not a cure for a person s ANABEL.  However, surgery will often significantly improve one s ANABEL severity (termed  success rate ).  Even in the absence of a cure, surgery will decrease the cardiovascular risk associated with OSA7; improve overall quality of life8 (sleepiness, functionality, sleep quality, etc).          Palate Procedures:  Patients with ANABEL often have narrowing of their airway in the region of their tonsils and uvula.  The goals of palate procedures are to widen the airway in this region as well as to help the tissues resist collapse.  Modern palate procedure techniques focus on tissue conservation and soft tissue rearrangement, rather than tissue removal.  Often the uvula is preserved in this procedure. Residual sleep apnea is common in patient after pharyngoplasty with an average reduction in sleep apnea events of 33%2.      Tongue Procedures:  While patients are awake, the muscles that surround the throat are active and keep this region open for breathing. These muscles relax during sleep, allowing the tongue and other structures to collapse and block breathing.  There are several different tongue procedures available.  Selection of a tongue base procedure depends on characteristics seen on physical exam.  Generally, procedures are aimed at removing bulky tissues in this area or preventing the back of the tongue from falling back during sleep.  Success rates for tongue surgery range from 50-62%3.    Hypoglossal Nerve Stimulation:  Hypoglossal nerve stimulation has recently received approval from the [place] Food and Drug Administration for the treatment of obstructive sleep apnea.  This is based on research showing  that the system was safe and effective in treating sleep apnea6.  Results showed that the median AHI score decreased 68%, from 29.3 to 9.0. This therapy uses an implant system that senses breathing patterns and delivers mild stimulation to airway muscles, which keeps the airway open during sleep.  The system consists of three fully implanted components: a small generator (similar in size to a pacemaker), a breathing sensor, and a stimulation lead.  Using a small handheld remote, a patient turns the therapy on before bed and off upon awakening.    Candidates for this device must be greater than 22 years of age, have moderate to severe ANABEL (AHI between 20-65), BMI less than 32, have tried CPAP/oral appliance without success, and have appropriate upper airway anatomy (determined by a sleep endoscopy performed by Dr. Bob).    Hypoglossal Nerve Stimulation Pathway:    The sleep surgeon s office will work with the patient through the insurance prior-authorization process (including communications and appeals).    Nasal Procedures:  Nasal obstruction can interfere with nasal breathing during the day and night.  Studies have shown that relief of nasal obstruction can improve the ability of some patients to tolerate positive airway pressure therapy for obstructive sleep apnea1.  Treatment options include medications such as nasal saline, topical corticosteroid and antihistamine sprays, and oral medications such as antihistamines or decongestants. Non-surgical treatments can include external nasal dilators for selected patients. If these are not successful by themselves, surgery can improve the nasal airway either alone or in combination with these other options.    Combination Procedures:  Combination of surgical procedures and other treatments may be recommended, particularly if patients have more than one area of narrowing or persistent positional disease.  The success rate of combination surgery ranges from 66-80%2,3.       1. Júnior PRICE. The Role of the Nose in Snoring and Obstructive Sleep Apnoea: An Update.  Eur Arch Otorhinolaryngol. 2011; 268: 1365-73.  2.  Thomas SM; Alisha JA; Anthony JR; Pallanch JF; Salvador MB; Jennifer SG; Swapna AUGUSTIN. Surgical modifications of the upper airway for obstructive sleep apnea in adults: a systematic review and meta-analysis. SLEEP 2010;33(10):0804-4935. Lamberto CANAS. Hypopharyngeal surgery in obstructive sleep apnea: an evidence-based medicine review.  Arch Otolaryngol Head Neck Surg. 2006 Feb;132(2):206-13.  3. Gavin YH1, Randi Y, Rigo OTILIO. The efficacy of anatomically based multilevel surgery for obstructive sleep apnea. Otolaryngol Head Neck Surg. 2003 Oct;129(4):327-35.  4. Lamberto CANAS, Goldberg A. Hypopharyngeal Surgery in Obstructive Sleep Apnea: An Evidence-Based Medicine Review. Arch Otolaryngol Head Neck Surg. 2006 Feb;132(2):206-13.  5. Fide PJ et al. Upper-Airway Stimulation for Obstructive Sleep Apnea.  N Engl J Med. 2014 Jan 9;370(2):139-49.  6. Neetu Y et al. Increased Incidence of Cardiovascular Disease in Middle-aged Men with Obstructive Sleep Apnea. Am J Respir Crit Care Med; 2002 166: 159-165  7. Millan EM et al. Studying Life Effects and Effectiveness of Palatopharyngoplasty (SLEEP) study: Subjective Outcomes of Isolated Uvulopalatopharyngoplasty. Otolaryngol Head Neck Surg. 2011; 144: 623-631.  Meet with the nurse and schedule sleep study and follow up visit. If the study is negative, we'll discuss the next step during the follow up visit. You may use melatonin during the night of the study.    Thank you!    Frank Mendoza MD, MPH  Clinical Sleep and Occupational / Environmental Medicine            Follow-ups after your visit        Your next 10 appointments already scheduled     Aug 08, 2017  9:15 AM CDT   Ortho Treatment with Tamera Carbajal PT   [facility] ([address] [address]   [phone]         "      Future tests that were ordered for you today     Open Future Orders        Priority Expected Expires Ordered    HST-Home Sleep Apnea Test Routine  2/1/2018 8/2/2017            Who to contact     If you have questions or need follow up information about today's clinic visit or your schedule please contact [facility] directly at [phone].  Normal or non-critical lab and imaging results will be communicated to you by MyChart, letter or phone within 4 business days after the clinic has received the results. If you do not hear from us within 7 days, please contact the clinic through Deckertonhart or phone. If you have a critical or abnormal lab result, we will notify you by phone as soon as possible.  Submit refill requests through CenTrak or call your pharmacy and they will forward the refill request to us. Please allow 3 business days for your refill to be completed.          Additional Information About Your Visit        Deckertonhart Information     C[employer]Trak gives you secure access to your electronic health record. If you see a primary care provider, you can also send messages to your care team and make appointments. If you have questions, please call your primary care clinic.  If you do not have a primary care provider, please call [phone] and they will assist you.        Care EveryWhere ID     This is your Care EveryWhere ID. This could be used by other organizations to access your [facility] medical records  [id number]        Your Vitals Were     Pulse Height Pulse Oximetry BMI (Body Mass Index)          78 1.88 m (6' 2\") 95% 33.18 kg/m2         Blood Pressure from Last 3 Encounters:   08/02/17 115/75   07/21/17 110/80   09/20/16 118/74    Weight from Last 3 Encounters:   08/02/17 117.2 kg (258 lb 6.4 oz)   07/21/17 118.4 kg (261 lb)   09/20/16 106.9 kg (235 lb 9.6 oz)              We Performed the Following     SLEEP EVALUATION & MANAGEMENT REFERRAL - ADULT        Primary Care Provider Office " Phone # Fax #    Tina Ramirez, [facility] [phone]       [address] [address]        Equal Access to Services     GIULIA HERRING : Hadii aad ku hadmanpreeto Soomaali, waaxda luqadaha, qaybta kaalmada adeegyada, tracey fernanedzdeisy dowlingcarlene garciamarkell [facility]. So [facility] [phone].    ATENCIÓN: Si habla español, tiene a molina disposición servicios gratuitos de asistencia lingüística. Llame al [phone].    We comply with applicable federal civil rights laws and [place] laws. We do not discriminate on the basis of race, color, national origin, age, disability sex, sexual orientation or gender identity.            Thank you!     Thank you for choosing [facility]  for your care. Our goal is always to provide you with excellent care. Hearing back from our patients is one way we can continue to improve our services. Please take a few minutes to complete the written survey that you may receive in the mail after your visit with us. Thank you!             Your Updated Medication List - Protect others around you: Learn how to safely use, store and throw away your medicines at www.disposemymeds.org.          This list is accurate as of: 8/2/17 11:15 AM.  Always use your most recent med list.                   Brand Name Dispense Instructions for use Diagnosis    order for DME     1 unit marking on an U-100 insulin syringe    Equipment being ordered: heel cups    Plantar fasciitis       SUMAtriptan 25 MG tablet    IMITREX    30 tablet    Take 1-2 tablets (25-50 mg) by mouth at onset of headache May repeat in 1-2 hours after onset.    Chronic migraine without aura without status migrainosus, not intractable

## 2017-08-07 ENCOUNTER — OFFICE VISIT (OUTPATIENT)
Dept: SLEEP MEDICINE | Facility: CLINIC | Age: 52
End: 2017-08-07
Payer: COMMERCIAL

## 2017-08-07 DIAGNOSIS — R53.83 OTHER FATIGUE: ICD-10-CM

## 2017-08-07 DIAGNOSIS — E66.09 NON MORBID OBESITY DUE TO EXCESS CALORIES: ICD-10-CM

## 2017-08-07 DIAGNOSIS — G47.59 SLEEP-RELATED HALLUCINATIONS: ICD-10-CM

## 2017-08-07 DIAGNOSIS — G47.19 EXCESSIVE DAYTIME SLEEPINESS: ICD-10-CM

## 2017-08-07 DIAGNOSIS — R53.83 FATIGUE, UNSPECIFIED TYPE: ICD-10-CM

## 2017-08-07 PROCEDURE — G0399 HOME SLEEP TEST/TYPE 3 PORTA: HCPCS | Performed by: FAMILY MEDICINE

## 2017-08-07 NOTE — MR AVS SNAPSHOT
After Visit Summary   8/7/2017    Karthik Bird    MRN: [id number]           Patient Information     Date Of Birth          1965        Visit Information        Provider Department      8/7/2017 8:00 AM SLEEP LAB, BED FIVE [facility]        Today's Diagnoses     Fatigue, unspecified type        Sleep-related hallucinations        Non morbid obesity due to excess calories        Other fatigue        Excessive daytime sleepiness           Follow-ups after your visit        Your next 10 appointments already scheduled     Aug 08, 2017  8:00 AM CDT   HST Drop Off with SLEEP LAB, BED FIVE   [facility] ([facility])    [address]  [facility] [address]   [phone]            Aug 08, 2017  9:15 AM CDT   [facility]th[facility]al PT   [facility]    [address] [facility] [address]   [phone]            Aug 16, 2017  4:30 PM CDT   Return Sleep Patient with Frank Mendoza MD   [facility] ([facility])    [address]  [facility] [address]   [phone]              Who to contact     If you have questions or need follow up information about today's clinic visit or your schedule please contact [facility] directly at [phone].  Normal or non-critical lab and imaging results will be communicated to you by MyChart, letter or phone within 4 business days after the clinic has received the results. If you do not hear from us within 7 days, please contact the clinic through MyChart or phone. If you have a critical or abnormal lab result, we will notify you by phone as soon as possible.  Submit refill requests through Enviable Abode or call your pharmacy and they will forward the refill request to us. Please allow 3 business days for your refill to be completed.          Additional Information About Your Visit         Ozmota Information     Ozmota gives you secure access to your electronic health record. If you see a primary care provider, you can also send messages to your care team and make appointments. If you have questions, please call your primary care clinic.  If you do not have a primary care provider, please call [phone] and they will assist you.        Care EveryWhere ID     This is your Care EveryWhere ID. This could be used by other organizations to access your [place] medical records  [id number]         Blood Pressure from Last 3 Encounters:   08/02/17 115/75   07/21/17 110/80   09/20/16 118/74    Weight from Last 3 Encounters:   08/02/17 117.2 kg (258 lb 6.4 oz)   07/21/17 118.4 kg (261 lb)   09/20/16 106.9 kg (235 lb 9.6 oz)              We Performed the Following     HST-Home Sleep Apnea Test        Primary Care Provider Office Phone # Fax #    Tina Kalyani James, [facility] [phone]       [place] [address]        Equal Access to Services     YOVANY HERRING : Hadii aad ku hadasho Soomaali, waaxda luqadaha, qaybta kaalmada adeamy, tracey kowalski . So [facility] [phone].    ATENCIÓN: Si habla español, tiene a molina disposición servicios gratAlbuquerque Indian Dental Clinicos de asistencia lingüística. Marlene al [phone].    We comply with applicable federal civil rights laws and [place] laws. We do not discriminate on the basis of race, color, national origin, age, disability sex, sexual orientation or gender identity.            Thank you!     Thank you for choosing [facility]  for your care. Our goal is always to provide you with excellent care. Hearing back from our patients is one way we can continue to improve our services. Please take a few minutes to complete the written survey that you may receive in the mail after your visit with us. Thank you!             Your Updated Medication List - Protect others around you:  Learn how to safely use, store and throw away your medicines at www.disposemymeds.org.          This list is accurate as of: 8/7/17  8:06 AM.  Always use your most recent med list.                   Brand Name Dispense Instructions for use Diagnosis    order for DME     1 unit marking on an U-100 insulin syringe    Equipment being ordered: heel cups    Plantar fasciitis       SUMAtriptan 25 MG tablet    IMITREX    30 tablet    Take 1-2 tablets (25-50 mg) by mouth at onset of headache May repeat in 1-2 hours after onset.    Chronic migraine without aura without status migrainosus, not intractable

## 2017-08-08 ENCOUNTER — HOSPITAL ENCOUNTER (OUTPATIENT)
Dept: PHYSICAL THERAPY | Facility: CLINIC | Age: 52
Setting detail: THERAPIES SERIES
End: 2017-08-08
Attending: NURSE PRACTITIONER
Payer: COMMERCIAL

## 2017-08-08 ENCOUNTER — DOCUMENTATION ONLY (OUTPATIENT)
Dept: SLEEP MEDICINE | Facility: CLINIC | Age: 52
End: 2017-08-08

## 2017-08-08 PROCEDURE — 97110 THERAPEUTIC EXERCISES: CPT | Mod: GP | Performed by: PHYSICAL THERAPIST

## 2017-08-08 PROCEDURE — 40000718 ZZHC STATISTIC PT DEPARTMENT ORTHO VISIT: Performed by: PHYSICAL THERAPIST

## 2017-08-08 PROCEDURE — 97140 MANUAL THERAPY 1/> REGIONS: CPT | Mod: GP | Performed by: PHYSICAL THERAPIST

## 2017-08-10 NOTE — PROCEDURES
HOME SLEEP STUDY INTERPRETATION    Patient: Nigel Bird  MRN: [id number]  YOB: 1965  Study Date: 8/7/2017  Ordering Provider: Frank Mendoza MD, MPH     Indications for Home Study: Nigel Bird is a 52 male with a history of chronic headache who presents with symptoms suggestive of obstructive sleep apnea.    Patient's Characteristics:   Weight: 117.0 kg   Height: 188.0 cm   BMI: 33.1 Kg/m2   Age: 52 years old   Milan Score: 15    Data: A full night home sleep study was performed recording the standard physiologic parameters including body position, movement, sound, nasal pressure, thermal oral airflow, chest and abdominal movements with respiratory inductance plethysmography, and oxygen saturation by pulse oximetry. Pulse rate was estimated by oximetry recording. This study was considered adequate based on > 4 hours of quality oximetry and respiratory recording.     Recording Information:  Analysis Time:  600.0 minutes  Time in Bed: 462.1 minutes   Estimated sleep efficiency: 95.8%.   Time spent in supine position:  78.1% of total bed time (361.4 minutes)  Time spent in non-supine positions: 21.8% of total bed time (100.8 minutes)    Oximetry Analysis:   Sleep Associated Hypoxemia: Sustained hypoxemia was not present. Baseline oxygen saturation was 93.3%. Time with saturation less than or equal to 88% was 0.4 minutes. The lowest oxygen saturation was 86.0%.     Respiratory Analysis  Snoring: Snoring was present.  Respiratory events: The home study revealed a presence of 0.8 obstructive apneas and 0.0 mixed events, and 0.0 central apneas. There were 27 hypopneas resulting in a combined apnea / hypopnea index (AHI) of 4.3 events per hour. The AHI supine was 5.1 events per hour, whereas the AHI non-supine was 1.2 events per hour.    Pattern: Excluding events noted above, respiratory rate and pattern was normal.    Position: Percent of time spent: supine- 78.1%; Prone- 0.0%; On left side- 6.5%; On  right side- 15.3%.    Cardiac Analysis:  Maximum Pulse Rate: 99.0 beats per minute (bpm)  Minimum Pulse Rate: 55.0 beats per minute (bpm)  Average Pulse Rate: 67.6 beats per minute (bpm)  Time Spent Above 100 bpm: 0.0 minutes  Time Spent Below 40 bpm: 0.0 minutes    Heart Rate: By pulse oximetry, normal rate was noted (normal pulse rate during sleep between 40 bpm and 90 bpm).    Assessment:    (1) No obstructive sleep apnea.   (2) Sleep associated hypoxemia was not present.    Recommendations:  1. No sleep disordered breathing noted during this portable HST sleep study. If any sleep disordered breathing such as obstructive sleep apnea is still highly suspected, an in-lab PSG sleep study may be considered.   2. Suggest optimizing sleep hygiene and avoiding sleep deprivation.  3. Weight management.  4. Avoid sedating medications, including narcotics and alcohol, as these may exacerbate sleep apnea and/or underlying respiratory disorders.  5. Avoid driving when drowsy.    Diagnosis Code(s):   (1) Snoring R06.83    (2) Normal Portable Home Sleep Study    It is important to note that portable HST sleep studies may tend to underestimate the true severity of the condition. Also, it is important to highlight that an in-lab titration PSG sleep study or even another in-lab PSG sleep study may be necessary to adequately understand or treat the condition.    Frank Mendoza MD, MPH, 08/10/2017  Diplomate, American Board of Family Medicine, Sleep Medicine

## 2017-08-15 ENCOUNTER — HOSPITAL ENCOUNTER (OUTPATIENT)
Dept: PHYSICAL THERAPY | Facility: CLINIC | Age: 52
Setting detail: THERAPIES SERIES
End: 2017-08-15
Attending: NURSE PRACTITIONER
Payer: COMMERCIAL

## 2017-08-15 PROCEDURE — 40000718 ZZHC STATISTIC PT DEPARTMENT ORTHO VISIT: Performed by: PHYSICAL THERAPIST

## 2017-08-15 PROCEDURE — 97110 THERAPEUTIC EXERCISES: CPT | Mod: GP | Performed by: PHYSICAL THERAPIST

## 2017-08-16 ENCOUNTER — OFFICE VISIT (OUTPATIENT)
Dept: SLEEP MEDICINE | Facility: CLINIC | Age: 52
End: 2017-08-16
Payer: COMMERCIAL

## 2017-08-16 VITALS
DIASTOLIC BLOOD PRESSURE: 75 MMHG | SYSTOLIC BLOOD PRESSURE: 114 MMHG | HEIGHT: 74 IN | WEIGHT: 258 LBS | OXYGEN SATURATION: 98 % | BODY MASS INDEX: 33.11 KG/M2 | HEART RATE: 71 BPM

## 2017-08-16 DIAGNOSIS — G47.10 HYPERSOMNIA: Primary | ICD-10-CM

## 2017-08-16 DIAGNOSIS — E66.09 NON MORBID OBESITY DUE TO EXCESS CALORIES: ICD-10-CM

## 2017-08-16 DIAGNOSIS — R44.2 HYPNOPOMPIC HALLUCINATION: ICD-10-CM

## 2017-08-16 DIAGNOSIS — G47.19 EXCESSIVE DAYTIME SLEEPINESS: ICD-10-CM

## 2017-08-16 PROCEDURE — 99215 OFFICE O/P EST HI 40 MIN: CPT | Performed by: FAMILY MEDICINE

## 2017-08-16 NOTE — MR AVS SNAPSHOT
After Visit Summary   8/16/2017    Karthik Bird    MRN: [id number]           Patient Information     Date Of Birth          1965        Visit Information        Provider Department      8/16/2017 4:30 PM Frank Mendoza MD [facility]        Today's Diagnoses     Hypersomnia    -  1    Excessive daytime sleepiness        Hypnopompic hallucination        Non morbid obesity due to excess calories          Care Instructions      Your BMI is Body mass index is 33.13 kg/(m^2).  Weight management is a personal decision.  If you are interested in exploring weight loss strategies, the following discussion covers the approaches that may be successful. Body mass index (BMI) is one way to tell whether you are at a healthy weight, overweight, or obese. It measures your weight in relation to your height.  A BMI of 18.5 to 24.9 is in the healthy range. A person with a BMI of 25 to 29.9 is considered overweight, and someone with a BMI of 30 or greater is considered obese. More than two-thirds of American adults are considered overweight or obese.  Being overweight or obese increases the risk for further weight gain. Excess weight may lead to heart disease and diabetes.  Creating and following plans for healthy eating and physical activity may help you improve your health.  Weight control is part of healthy lifestyle and includes exercise, emotional health, and healthy eating habits. Careful eating habits lifelong are the mainstay of weight control. Though there are significant health benefits from weight loss, long-term weight loss with diet alone may be very difficult to achieve- studies show long-term success with dietary management in less than 10% of people. Attaining a healthy weight may be especially difficult to achieve in those with severe obesity. In some cases, medications, devices and surgical management might be considered.  What can you do?  If you are overweight or obese and are  interested in methods for weight loss, you should discuss this with your provider.     Consider reducing daily calorie intake by 500 calories.     Keep a food journal.     Avoiding skipping meals, consider cutting portions instead.    Diet combined with exercise helps maintain muscle while optimizing fat loss. Strength training is particularly important for building and maintaining muscle mass. Exercise helps reduce stress, increase energy, and improves fitness. Increasing exercise without diet control, however, may not burn enough calories to loose weight.       Start walking three days a week 10-20 minutes at a time    Work towards walking thirty minutes five days a week     Eventually, increase the speed of your walking for 1-2 minutes at time    In addition, we recommend that you review healthy lifestyles and methods for weight loss available through the National Institutes of Health patient information sites:  http://win.niddk.nih.gov/publications/index.htm    And look into health and wellness programs that may be available through your health insurance provider, employer, local community center, or cody club.    Weight management plan: Patient was referred to their PCP to discuss a diet and exercise plan.   Meet with the nurse and schedule actigraphy, sleep study, and nap test. While doing the actigraphy (watch test), remember to fill lout the sleep diary and bring these to the sleep center when having the sleep study. Today, remember to have the urine toxicology screen test.    We will follow up after completing the entire test.    Thank you!    Frank Mendoza MD, MPH  Clinical Sleep and Occupational / Environmental Medicine            Follow-ups after your visit        Future tests that were ordered for you today     Open Future Orders        Priority Expected Expires Ordered    Comprehensive Sleep Study Routine  2/12/2018 8/16/2017    Drug abuse screen 77 urine ([place] only) Routine   "10/15/2017 8/16/2017            Who to contact     If you have questions or need follow up information about today's clinic visit or your schedule please contact [facility] directly at [phone].  Normal or non-critical lab and imaging results will be communicated to you by MyChart, letter or phone within 4 business days after the clinic has received the results. If you do not hear from us within 7 days, please contact the clinic through MyChart or phone. If you have a critical or abnormal lab result, we will notify you by phone as soon as possible.  Submit refill requests through [email or web address] or call your pharmacy and they will forward the refill request to us. Please allow 3 business days for your refill to be completed.          Additional Information About Your Visit        Neu Industrieshart Information     [email or web address] gives you secure access to your electronic health record. If you see a primary care provider, you can also send messages to your care team and make appointments. If you have questions, please call your primary care clinic.  If you do not have a primary care provider, please call [phone] and they will assist you.        Care EveryWhere ID     This is your Care EveryWhere ID. This could be used by other organizations to access your [place] medical records  [id number]        Your Vitals Were     Pulse Height Pulse Oximetry BMI (Body Mass Index)          71 1.88 m (6' 2\") 98% 33.13 kg/m2         Blood Pressure from Last 3 Encounters:   08/16/17 114/75   08/02/17 115/75   07/21/17 110/80    Weight from Last 3 Encounters:   08/16/17 117 kg (258 lb)   08/02/17 117.2 kg (258 lb 6.4 oz)   07/21/17 118.4 kg (261 lb)               Primary Care Provider Office Phone # Fax #    Tina Ramirez [phone]       [address] [place] [address]        Equal Access to Services     GIULIA HERRING AH: Brii Sen, waericda luqadaestefany, qaybta kaalmamita " tracey robledocarlene edimatt ochoaaadeisy ah. [facility] [phone].    ATENCIÓN: Si habla byron, tiene a molina disposición servicios gratuitos de asistencia lingüística. Marlene al [phone].    We comply with applicable federal civil rights laws and [place] laws. We do not discriminate on the basis of race, color, national origin, age, disability sex, sexual orientation or gender identity.            Thank you!     Thank you for choosing [facility]  for your care. Our goal is always to provide you with excellent care. Hearing back from our patients is one way we can continue to improve our services. Please take a few minutes to complete the written survey that you may receive in the mail after your visit with us. Thank you!             Your Updated Medication List - Protect others around you: Learn how to safely use, store and throw away your medicines at www.disposemymeds.org.          This list is accurate as of: 8/16/17  4:52 PM.  Always use your most recent med list.                   Brand Name Dispense Instructions for use Diagnosis    order for DME     1 unit marking on an U-100 insulin syringe    Equipment being ordered: heel cups    Plantar fasciitis       SUMAtriptan 25 MG tablet    IMITREX    30 tablet    Take 1-2 tablets (25-50 mg) by mouth at onset of headache May repeat in 1-2 hours after onset.    Chronic migraine without aura without status migrainosus, not intractable

## 2017-08-16 NOTE — PATIENT INSTRUCTIONS
Your BMI is Body mass index is 33.13 kg/(m^2).  Weight management is a personal decision.  If you are interested in exploring weight loss strategies, the following discussion covers the approaches that may be successful. Body mass index (BMI) is one way to tell whether you are at a healthy weight, overweight, or obese. It measures your weight in relation to your height.  A BMI of 18.5 to 24.9 is in the healthy range. A person with a BMI of 25 to 29.9 is considered overweight, and someone with a BMI of 30 or greater is considered obese. More than two-thirds of American adults are considered overweight or obese.  Being overweight or obese increases the risk for further weight gain. Excess weight may lead to heart disease and diabetes.  Creating and following plans for healthy eating and physical activity may help you improve your health.  Weight control is part of healthy lifestyle and includes exercise, emotional health, and healthy eating habits. Careful eating habits lifelong are the mainstay of weight control. Though there are significant health benefits from weight loss, long-term weight loss with diet alone may be very difficult to achieve- studies show long-term success with dietary management in less than 10% of people. Attaining a healthy weight may be especially difficult to achieve in those with severe obesity. In some cases, medications, devices and surgical management might be considered.  What can you do?  If you are overweight or obese and are interested in methods for weight loss, you should discuss this with your provider.     Consider reducing daily calorie intake by 500 calories.     Keep a food journal.     Avoiding skipping meals, consider cutting portions instead.    Diet combined with exercise helps maintain muscle while optimizing fat loss. Strength training is particularly important for building and maintaining muscle mass. Exercise helps reduce stress, increase energy, and improves fitness.  Increasing exercise without diet control, however, may not burn enough calories to loose weight.       Start walking three days a week 10-20 minutes at a time    Work towards walking thirty minutes five days a week     Eventually, increase the speed of your walking for 1-2 minutes at time    In addition, we recommend that you review healthy lifestyles and methods for weight loss available through the National Institutes of Health patient information sites:  http://win.niddk.nih.gov/publications/index.htm    And look into health and wellness programs that may be available through your health insurance provider, employer, local community center, or cody club.    Weight management plan: Patient was referred to their PCP to discuss a diet and exercise plan.   Meet with the nurse and schedule actigraphy, sleep study, and nap test. While doing the actigraphy (watch test), remember to fill lout the sleep diary and bring these to the sleep center when having the sleep study. Today, remember to have the urine toxicology screen test.    We will follow up after completing the entire test.    Thank you!    Frank Mendoza MD, MPH  Clinical Sleep and Occupational / Environmental Medicine

## 2017-08-16 NOTE — PROGRESS NOTES
Sleep Center [facility]  Outpatient Sleep Medicine Follow Up Visit  August 16, 2017     Name: Karthik Bird MRN# [id number]   Age: 52 year old YOB: 1965       Date of Consultation: August 2, 2017  Consultation is requested by: Tina Ramirez CNP  [address] [address]  Primary care provider: Tina Ramirez     Patient is accompanied by: Patient presents alone today        Reason for Sleep Consult:      Karthik Bird is a 52 year old male patient that presents here for a follow up visit after completing a HST sleep study.          Assessment and Plan:      Summary Sleep Diagnoses:     (1) EDS  (2) Fatigue  (3) Obesity  (4) Hypnopompic Hallucinations     Summary Recommendations:     The patient describes severe EDS and fatigue that started many years ago. He underwent an evaluation by his PCP with some preliminary blood studies showing no abnormalities. The patient denies any snoring, gasping / choking for air, or witnessed apneas, but he still has an intermediate pre-test probability of ANABEL with a StopBang score of 4 / 8. As such, he underwent a portable HST sleep study demonstrating no sleep disordered breathing. The patient does endorse some components that may suggest a central hypersomnia disorder with hypnopompic hallucinations. As such, after extensive discussion with the patient and since his recent portable HST sleep study was negative, we determine that he will undergo a complete work up for hypersomnia of possible central origin. Today, the patient will have a urine toxicology screen. After that, sleep diary and actigraphy will be started for two weeks. At the end of the two weeks, the patient will undergo an in-lab split-night PSG sleep study. If no abnormalities are noted, he will also complete a MSLT evaluation. Today, the nature and pathophysiology of ANABEL were discussed once again. The nature and pathophysiology of hypersomnia  disorders were also presented. The different treatment options for ANABEL as well as for hypersomnia conditions were also reviewed and explained today. Lifestyle recommendations including healthy dietary and exercising habits were discussed. Pt will follow up with me after completing the entire workup.      Coding:  (G47.19) Excessive daytime sleepiness (primary encounter diagnosis)  (R53.83) Fatigue, unspecified type  (R53.83) Other fatigue  (E66.09) Non morbid obesity due to excess calories  (R44.1) Sleep-related hallucinations     Counseling included a comprehensive review of diagnostic and therapeutic strategies as well as risks of inadequate therapy.     Educational materials provided in instructions. The patient was instructed to avoid driving or operating any heavy machinery when experiencing drowsiness.     All questions and concerns were addressed today. Pt agrees and understands the assessment and plan.          History of Present Illness:      Karthik Bird is a 52 year old  LHD male pt with history of chronic migraine headache and ED presents for a follow up evaluation today after having completed a portable HST sleep study due to EDS and unrestorative sleep. This pt was originally referred to us due to fatigue by PCP (I was asked to see and evaluate the patient by the PCP due to fatigue).  Recent lab studies including lyme serum studies, vitamin B12 levels, CMP, CBC, and TSH were all within normal range (no vitamin D values obtained).     During the initial visit, the pt reported EDS, unrestorative sleep, and sleep inertia for over 30 yrs. He also reported some drowsiness when driving. Pt also endorsed some inadvertent naps at his desk (after an inadvertent nap, the patient feels more tired - sleep is nonrestorative). The patient admitted having some hypnopompic hallucinations. For instance, one time he saw fire in the bedroom. These events only happen once or twice a month. Pt denied any gasping /  choking for air, snoring, or witnessed apneas. Pt denied any nocturia, GERD sxs, xerostomia, CP, SOB, peripheral edema, fever, cough, or any other sxs or concerns with negative ROS.    After the initial visit, the patient completed a portable HST sleep study. This demonstrated no ANABEL with no sleep associated hypoxemia either (AHI was 4.3 events per hour).    Pt explains that he feels very tired and would like to complete an evaluation to determine if this EDS is secondary to a sleep disorder.      PREVIOUS IN- LAB or HOME SLEEP STUDIES: None Reported     SLEEP-WAKE SCHEDULE:      Karthik Bird                           -Describes himself as neither a morning or night person; prefers to go to sleep at 11:30 PM and wakes up at 8:30 AM.                           -Pt does not take naps during the week; takes some inadvertent naps.                           -ON WEEKDAYS, goes to sleep at 11:30 PM during the week; awakens 6:30 AM with an alarm; falls asleep in 15 minutes; denies difficulty falling asleep.                           -ON WEEKENDS, goes to sleep at 11:30 PM and wakes up at 8:30 AM with an alarm; falls asleep in 15 minutes.                             -Awakens 1-2 times a night for 10 minutes before falling back to sleep; awakens to go to the bathroom.       BEDTIME ACTIVITIES AND SHIFT WORK:     Karthik Bird                          -Bedtime Activities and Other Sleeping Information: Sleeps with wife on a lindsay size bed. The bedroom is dark and cool. Pt sleeps on sides. Pt does use TV in bed. No pets in the bedroom.                           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                                            -Working Hours: 730 AM to 430 PM      SCALES                       SLEEP APNEA: Stopbang score: 4 / 8                       SLEEPINESS: Rothbury sleepiness scale (ESS):  15 / 24 (initial encounter)                                            Drowsy driving / near  accidents: Some drowsiness when no near accidents                                            Consequences: Falling asleep at work     SLEEP COMPLAINTS:  Cardio-respiratory                         -Snoring: No snoring reported                        -Dyspnea: Pt denies having any witnessed apneas or dyspnea                       -Morning headaches or confusion: Denies any morning cephalgia                       -Coexisting Lung disease: Denies diagnosed or known lung disease at this time                                                         -Coexisting Heart disease: Denies diagnosed or known cardiovascular disease at this time                                                       -Does patient have a bed partner: Patient sleeps with spouse                       -Has bed partner been sleeping separately because of snoring:  No      RLS Screen:                           -When you try to relax in the evening or sleep at night, do you ever have unpleasant, restless feelings in your legs that can be relieved by walking or movement? None Reported                          -Periodic limb movement: None Reported     Narcolepsy:                    - Denies sudden urges of sleep attacks                  - Denies cataplexy                  - Denies sleep paralysis                   - Admits hallucinations. Pt explains that sometimes, when waking up, he sees fire and other things that are not really happening or there in the bedroom.      Sleep Behaviors:                  - Denies leg symptoms/movements                  - Denies motor restlessness                  - Denies night terrors                  - Admits bruxism                  - Denies automatic behaviors     Other Subjective Complaints:                  - Denies anxiety or rumination                   - Denies pain and discomfort at night                  - Denies waking up with heart pounding or racing                  - Denies GERD or aspiration      Parasomnia:                         -NREM - Admits recurrent persistent confusional arousal, night eating, sleep walking or sleep terrors. Pt used to sleep walking several years ago, but this improved. He has never injured self or others.                           -REM - Denies dream enactment or injuries.      -Driving Accident or Near Accidents: Some drowsiness when driving          Medications:           Current Outpatient Prescriptions   Medication Sig     order for DME Equipment being ordered: heel cups     SUMAtriptan (IMITREX) 25 MG tablet Take 1-2 tablets (25-50 mg) by mouth at onset of headache May repeat in 1-2 hours after onset.      No current facility-administered medications for this visit.               Allergies   Allergen Reactions     Nka [No Known Allergies]             Past Medical History:      Denies needing any 02 supplement at night.      Past Medical History         Past Medical History:   Diagnosis Date     Migraine       Seasonal allergies                Past Surgical History:    Denies previous upper airway surgery.       Past Surgical History          Past Surgical History:   Procedure Laterality Date     APPENDECTOMY         at 14 yo     COLONOSCOPY         GENITOURINARY SURGERY         previous vasectomy 2/2014     VASECTOMY   5/30/2014     Procedure: VASECTOMY;  Surgeon: Zaida Slaughter MD;  Location: [facility] OR              Social History:              Social History    Substance Use Topics      Smoking status: Never Smoker      Smokeless tobacco: Never Used      Alcohol use Yes         Comment: twice a week       Chemical History:                       Tobacco: Never smoked                       Uses 3 to 5 cups/day of coffee. Last caffeine intake is usually before 630 PM.                       Supplements for wakefulness: Patient uses Redbull (one a day)                       EtOH: 1 to 2 drinks a week  Recreational Drugs: Patient denies using any recreational drugs      Psych Hx:   PHQ2:  Positive                       -Little Interest or pleasure in doing things? 1 - several days                       -Feeling down, depressed, or hopeless? 1 - several days     Current dangers to self or others: No. Pt denies any SI / HI, hallucinations, or delusions          Family History:       Family History           Family History   Problem Relation Age of Onset     CANCER Mother         lung- uterine     Liver Disease Father       Alzheimer Disease Maternal Grandmother         99     Pneumonia Maternal Grandfather         99         Sleep Family Hx:        RLS - None reported                  ANABEL - None reported  Insomnia - None reported  Parasomnia - None reported          Review of Systems:      A complete 10 point review of systems was negative other than HPI or as commented below:      CONSTITUTIONAL: NEGATIVE for weight gain/loss, fever, chills, sweats or night sweats, drug allergies.  EYES: NEGATIVE for changes in vision, blind spots, double vision.  ENT: NEGATIVE for ear pain, sore throat, sinus pain, post-nasal drip, runny nose, bloody nose  CARDIAC: NEGATIVE for fast heartbeats or fluttering in chest, chest pain or pressure, breathlessness when lying flat, swollen legs or swollen feet.  NEUROLOGIC: NEGATIVE headaches, weakness or numbness in the arms or legs.  DERMATOLOGIC: NEGATIVE for rashes, new moles or change in mole(s)  PULMONARY: NEGATIVE SOB at rest, SOB with activity, dry cough, productive cough, coughing up blood, wheezing or whistling when breathing.    GASTROINTESTINAL: NEGATIVE for nausea or vomitting, loose or watery stools, fat or grease in stools, constipation, abdominal pain, bowel movements black in color or blood noted.  GENITOURINARY: NEGATIVE for pain during urination, blood in urine, urinating more frequently than usual, irregular menstrual periods.  MUSCULOSKELETAL: NEGATIVE for muscle pain, bone or joint pain, swollen joints.  ENDOCRINE: NEGATIVE for increased thirst or  "urination, diabetes.  LYMPHATIC: NEGATIVE for swollen lymph nodes, lumps or bumps in the breasts or nipple discharge.          Physical Examination:   /75  Pulse 71  Ht 1.88 m (6' 2\")  Wt 117 kg (258 lb)  SpO2 98%  BMI 33.13 kg/m2     VS: Reviewed and normal.  General: Alert, oriented, not in distress. Dressed casually; Good eye contact; Comfortably sitting in a chair; in no apparent distress  HEENT: Normocephalic and atraumatic; NL TM x 2; pupils are isocoric and equally responsive to the light. PERRLA. EOMI. Normal fundoscopic examination; Nasal turbinates are normal with a normal septal alignment;  Mallampati score: Grade III; Tonsillar hypertrophy: 2  visible at pillars; Pharynx with no erythema or exudates.  NECK: neck supple; symmetrical; no lymphadenopathy; no thyromegaly, bruit, JVD noted. Neck circumference of 17.25 inches (44 cm).  Lungs: both hemithoraces are symmetrical, normal to palpation, no dullness to percussion, auscultation of lungs revealed normal breath sounds with no expirium prolongation, wheezing, rhonci and crackles.  CVS: Normal S1 and S2 heart sounds with no extra heart sounds. No murmur, rubs, or clicks. Normal peripheral pulses throughout with no obvious peripheral edema.  Psychiatry: Mood and affect are appropriate. Euthymic with affect congruent with full range and intensity. No SI/HI with adequate insight and judgement.           Data: All pertinent previous laboratory data reviewed      No results found for: PH, PHARTERIAL, PO2, [id number], SAT, PCO2, HCO3, BASEEXCESS, IGNACIO, BEB        Lab Results   Component Value Date     TSH 1.29 07/21/2017     TSH 1.31 01/16/2015            Lab Results   Component Value Date     GLC 73 07/21/2017     GLC 83 01/16/2015            Lab Results   Component Value Date     HGB 16.4 07/21/2017            Lab Results   Component Value Date     BUN 17 07/21/2017     BUN 14 01/16/2015     CR 0.90 07/21/2017     CR 1.02 01/16/2015     "   Echocardiology: None Available     Chest x-ray: None Available     PFT: None Available     Laboratory Studies:      Component Value Flag Ref Range Units Status Collected Lab   TSH 1.29   0.40 - 4.00 mU/L Final 07/21/2017 10:38 AM 59      Component Value Flag Ref Range Units Status Collected Lab   WBC 6.4   4.0 - 11.0 10e9/L Final 07/21/2017 10:38 AM PI   RBC Count 5.54   4.4 - 5.9 10e12/L Final 07/21/2017 10:38 AM PI   Hemoglobin 16.4   13.3 - 17.7 g/dL Final 07/21/2017 10:38 AM PI   Hematocrit 46.1   40.0 - 53.0 % Final 07/21/2017 10:38 AM PI   MCV 83   78 - 100 fl Final 07/21/2017 10:38 AM PI   MCH 29.6   26.5 - 33.0 pg Final 07/21/2017 10:38 AM PI   MCHC 35.6   31.5 - 36.5 g/dL Final 07/21/2017 10:38 AM PI   RDW 13.2   10.0 - 15.0 % Final 07/21/2017 10:38 AM PI   Platelet Count 249   150 - 450 10e9/L Final 07/21/2017 10:38 AM PI   Diff Method         Final 07/21/2017 10:38 AM PI   Automated Method   % Neutrophils 62.1     % Final 07/21/2017 10:38 AM PI   % Lymphocytes 23.3     % Final 07/21/2017 10:38 AM PI   % Monocytes 11.0     % Final 07/21/2017 10:38 AM PI   % Eosinophils 3.0     % Final 07/21/2017 10:38 AM PI   % Basophils 0.6     % Final 07/21/2017 10:38 AM PI   Absolute Neutrophil 4.0   1.6 - 8.3 10e9/L Final 07/21/2017 10:38 AM PI   Absolute Lymphocytes 1.5   0.8 - 5.3 10e9/L Final 07/21/2017 10:38 AM PI   Absolute Monocytes 0.7   0.0 - 1.3 10e9/L Final 07/21/2017 10:38 AM PI   Absolute Eosinophils 0.2   0.0 - 0.7 10e9/L Final 07/21/2017 10:38 AM PI   Absolute Basophils 0.0   0.0 - 0.2 10e9/L Final 07/21/2017 10:38 AM PI      Component Value Flag Ref Range Units Status Collected Lab   Sodium 139   133 - 144 mmol/L Final 07/21/2017 10:38 AM 59   Potassium 4.1   3.4 - 5.3 mmol/L Final 07/21/2017 10:38 AM 59   Chloride 104   94 - 109 mmol/L Final 07/21/2017 10:38 AM 59   Carbon Dioxide 28   20 - 32 mmol/L Final 07/21/2017 10:38 AM 59   Anion Gap 7   3 - 14 mmol/L Final 07/21/2017 10:38 AM 59   Glucose 73    70 - 99 mg/dL Final 07/21/2017 10:38 AM 59   Urea Nitrogen 17   7 - 30 mg/dL Final 07/21/2017 10:38 AM 59   Creatinine 0.90   0.66 - 1.25 mg/dL Final 07/21/2017 10:38 AM 59   GFR Estimate 88   >60 mL/min/1.7m2 Final 07/21/2017 10:38 AM 59   Comment:   Non  GFR Calc   GFR Estimate If Black     >60 mL/min/1.7m2 Final 07/21/2017 10:38 AM 59   >90    GFR Calc     Calcium 8.7   8.5 - 10.1 mg/dL Final 07/21/2017 10:38 AM 59   Bilirubin Total 0.3   0.2 - 1.3 mg/dL Final 07/21/2017 10:38 AM 59   Albumin 3.9   3.4 - 5.0 g/dL Final 07/21/2017 10:38 AM 59   Protein Total 7.1   6.8 - 8.8 g/dL Final 07/21/2017 10:38 AM 59   Alkaline Phosphatase 66   40 - 150 U/L Final 07/21/2017 10:38 AM 59   ALT 30   0 - 70 U/L Final 07/21/2017 10:38 AM 59   AST 26   0 - 45 U/L Final 07/21/2017 10:38 AM 59      Component Value Flag Ref Range Units Status Collected Lab   Vitamin B12 528   193 - 986 pg/mL Final 07/21/2017 10:38 AM 51      Component Value Flag Ref Range Units Status Collected Lab   Lyme Disease Antibodies Serum 0.38   0.00 - 0.89   Final 07/26/2017  2:00 PM        Component Value Flag Ref Range Units Status Collected Lab   Testosterone Total 505   240 - 950 ng/dL Final 01/16/2015  9:20 AM 51      Frank Mendoza MD, MPH  Clinical Sleep Medicine     [place] [address], [place] [address] [phone] Clinic     Total time spent with patient: 40 min. Over >50% of the time was spent for face to face counseling, education, and evaluation.

## 2017-08-16 NOTE — NURSING NOTE
"Chief Complaint   Patient presents with     Study Results     discuss home sleep test       Initial /75  Pulse 71  Ht 1.88 m (6' 2\")  Wt 117 kg (258 lb)  SpO2 98%  BMI 33.13 kg/m2 Estimated body mass index is 33.13 kg/(m^2) as calculated from the following:    Height as of this encounter: 1.88 m (6' 2\").    Weight as of this encounter: 117 kg (258 lb).  Medication Reconciliation: complete   "

## 2018-10-03 ENCOUNTER — OFFICE VISIT (OUTPATIENT)
Dept: FAMILY MEDICINE | Facility: CLINIC | Age: 53
End: 2018-10-03
Payer: COMMERCIAL

## 2018-10-03 VITALS
SYSTOLIC BLOOD PRESSURE: 118 MMHG | RESPIRATION RATE: 16 BRPM | WEIGHT: 262 LBS | BODY MASS INDEX: 33.62 KG/M2 | HEIGHT: 74 IN | HEART RATE: 76 BPM | DIASTOLIC BLOOD PRESSURE: 64 MMHG | TEMPERATURE: 98 F

## 2018-10-03 DIAGNOSIS — E66.811 OBESITY (BMI 30.0-34.9): Primary | ICD-10-CM

## 2018-10-03 DIAGNOSIS — F33.1 MODERATE EPISODE OF RECURRENT MAJOR DEPRESSIVE DISORDER (H): ICD-10-CM

## 2018-10-03 DIAGNOSIS — Z23 NEED FOR PROPHYLACTIC VACCINATION AND INOCULATION AGAINST INFLUENZA: ICD-10-CM

## 2018-10-03 PROCEDURE — 99213 OFFICE O/P EST LOW 20 MIN: CPT | Mod: 25 | Performed by: NURSE PRACTITIONER

## 2018-10-03 PROCEDURE — 90682 RIV4 VACC RECOMBINANT DNA IM: CPT | Performed by: NURSE PRACTITIONER

## 2018-10-03 PROCEDURE — 90471 IMMUNIZATION ADMIN: CPT | Performed by: NURSE PRACTITIONER

## 2018-10-03 RX ORDER — PHENTERMINE HYDROCHLORIDE 15 MG/1
15 CAPSULE ORAL EVERY MORNING
Qty: 30 CAPSULE | Refills: 0 | Status: CANCELLED | OUTPATIENT
Start: 2018-10-03

## 2018-10-03 ASSESSMENT — ANXIETY QUESTIONNAIRES
3. WORRYING TOO MUCH ABOUT DIFFERENT THINGS: SEVERAL DAYS
2. NOT BEING ABLE TO STOP OR CONTROL WORRYING: SEVERAL DAYS
GAD7 TOTAL SCORE: 6
1. FEELING NERVOUS, ANXIOUS, OR ON EDGE: SEVERAL DAYS
6. BECOMING EASILY ANNOYED OR IRRITABLE: SEVERAL DAYS
5. BEING SO RESTLESS THAT IT IS HARD TO SIT STILL: SEVERAL DAYS
7. FEELING AFRAID AS IF SOMETHING AWFUL MIGHT HAPPEN: NOT AT ALL

## 2018-10-03 ASSESSMENT — PATIENT HEALTH QUESTIONNAIRE - PHQ9: 5. POOR APPETITE OR OVEREATING: SEVERAL DAYS

## 2018-10-03 NOTE — PROGRESS NOTES
SUBJECTIVE:   Karthik Bird is a 53 year old male who presents to clinic today for the following health issues:      Depression     Status since last visit: Worsened for 6 months     See PHQ-9 for current symptoms.  Other associated symptoms: Memory issues    Complicating factors:   Significant life event:  Yes-  Weight gain    Current substance abuse:  None  Anxiety or Panic symptoms:  Yes-  Sleep issues- follows with Sleep specialty  Zoloft 50 mg in 2015  Escitalopram (no wt gain, no drowsiness)    PHQ-9 SCORE 10/3/2018   Total Score 12     MACKENZIE-7 SCORE 10/3/2018   Total Score 6       Sleep issues  (1) EDS  (2) Fatigue  (3) Obesity  (4) Hypnopompic Hallucinations     8/16/2017- Sleep Plan: follow-up with Dr. Mendoza after comprehensive workup for Hypersomnia disorder. He never did. He just wanted to make sure he didn't have sleep apnea.      Amount of exercise or physical activity: Walking. Kayaking, and biking     Problems taking medications regularly: No    Medication side effects: not applicable    Diet: regular (no restrictions)    Weight gain  Chronic issue with weight  He has been on Phentermine in 2015-   Overeating  Patient has done is research on Contrave. He is very interested in starting this. We discussed 3 months of a trial. If no <5% weight loss, this should be discontinued. He would need to follow with Weight Loss clinic if this becomes a long-term medication    Wt Readings from Last 10 Encounters:   10/03/18 262 lb (118.8 kg)   08/16/17 258 lb (117 kg)   08/02/17 258 lb 6.4 oz (117.2 kg)   07/21/17 261 lb (118.4 kg)   09/20/16 235 lb 9.6 oz (106.9 kg)   01/14/16 216 lb (98 kg)   09/10/15 204 lb (92.5 kg)   06/23/15 218 lb (98.9 kg)   04/10/15 234 lb (106.1 kg)   03/26/15 237 lb 3.2 oz (107.6 kg)     HPI:   PCP:  Tina Ramirez, [phone]    Patient Active Problem List   Diagnosis     Chronic migraine without aura, not intractable     Colon polyp     CARDIOVASCULAR SCREENING; LDL GOAL  "LESS THAN 160     Obesity (BMI 30.0-34.9)     ED (erectile dysfunction)     H/O rotator cuff tear     Current Outpatient Prescriptions   Medication     naltrexone-bupropion (CONTRAVE) 8-90 MG per 12 hr tablet     SUMAtriptan (IMITREX) 25 MG tablet     No current facility-administered medications for this visit.        Health Maintenance Due   Topic Date Due     DEPRESSION ACTION PLAN Q1 YR  05/04/1982     HIV SCREEN (SYSTEM ASSIGNED)  05/04/1982     PHQ-9 Q6 MONTHS  05/04/1982     HEPATITIS C SCREENING  05/04/1982     INFLUENZA VACCINE (1) 09/01/2018       Reviewed and updated:  Tobacco  Allergies  Meds  Med Hx  Surg Hx  Fam Hx  Soc Hx     ROS:  Constitutional, HEENT, cardiovascular, pulmonary, gi and gu systems are negative, except as otherwise noted.    PHYSICAL EXAM:   /64 (BP Location: Right arm, Patient Position: Sitting, Cuff Size: Adult Large)  Pulse 76  Temp 98  F (36.7  C) (Tympanic)  Resp 16  Ht 6' 2\" (1.88 m)  Wt 262 lb (118.8 kg)  BMI 33.64 kg/m2  Body mass index is 33.64 kg/(m^2).  GENERAL APPEARANCE: healthy, alert, no distress and over weight  RESP: lungs clear to auscultation - no rales, rhonchi or wheezes  CV: regular rates and rhythm, normal S1 S2, no S3 or S4 and no murmur, click or rub  PSYCH: mentation appears normal and affect normal/bright    ASSESSMENT & PLAN:     1. Obesity (BMI 30.0-34.9)  Chronic, uncontrolled  - naltrexone-bupropion (CONTRAVE) 8-90 MG per 12 hr tablet; Take 1 tablet PO in a.m. X 1 week, then 1 tablet PO twice daily x 1 week, then 2 tabs PO q a.m. and 1 tab PO q p.m.  Dispense: 90 tablet; Refill: 0  Recheck in 30 days for weight recheck (3 month dosing, maybe longer).   Call me if you have any bad reaction to this (Consider Escitalopram and Phentermine if he has side effects from Contrave).   Start Vitamin D3 5, 000 IU [employer] brand or Nature Made    2. Moderate episode of recurrent major depressive disorder (H)  Chronic, stable  - naltrexone-bupropion " (CONTRAVE) 8-90 MG per 12 hr tablet; Take 1 tablet PO in a.m. X 1 week, then 1 tablet PO twice daily x 1 week, then 2 tabs PO q a.m. and 1 tab PO q p.m.  Dispense: 90 tablet; Refill: 0  Recheck in weight    Risks, benefits, side effects and rationale for treatment plan fully discussed with the patient and understanding expressed.    JO[facility]

## 2018-10-03 NOTE — MR AVS SNAPSHOT
After Visit Summary   10/3/2018    Karthik Bird    MRN: [id number]           Patient Information     Date Of Birth          1965        Visit Information        Provider Department      10/3/2018 3:00 PM Tina Ramirez CNP [facility]        Today's Diagnoses     Obesity (BMI 30.0-34.9)    -  1    Moderate episode of recurrent major depressive disorder (H)          Care Instructions    1. Obesity (BMI 30.0-34.9)  Chronic, uncontrolled  - naltrexone-bupropion (CONTRAVE) 8-90 MG per 12 hr tablet; Take 1 tablet PO in a.m. X 1 week, then 1 tablet PO twice daily x 1 week, then 2 tabs PO q a.m. and 1 tab PO q p.m.  Dispense: 90 tablet; Refill: 0  Recheck in 30 days for weight recheck  Call me if you have any bad reaction to this  Vitamin D3 5, 000 IU Rent[employer] brand or Nature Made    2. Moderate episode of recurrent major depressive disorder (H)  Chronic, stable  - naltrexone-bupropion (CONTRAVE) 8-90 MG per 12 hr tablet; Take 1 tablet PO in a.m. X 1 week, then 1 tablet PO twice daily x 1 week, then 2 tabs PO q a.m. and 1 tab PO q p.m.  Dispense: 90 tablet; Refill: 0  Recheck in weight          Follow-ups after your visit        Who to contact     If you have questions or need follow up information about today's clinic visit or your schedule please contact [facility] directly at [phone].  Normal or non-critical lab and imaging results will be communicated to you by MyChart, letter or phone within 4 business days after the clinic has received the results. If you do not hear from us within 7 days, please contact the clinic through Aventoneshart or phone. If you have a critical or abnormal lab result, we will notify you by phone as soon as possible.  Submit refill requests through [employer] or call your pharmacy and they will forward the refill request to us. Please allow 3 business days for your refill to be completed.          Additional Information About Your Visit    "     Datavailhart Information     MarginPoint gives you secure access to your electronic health record. If you see a primary care provider, you can also send messages to your care team and make appointments. If you have questions, please call your primary care clinic.  If you do not have a primary care provider, please call [phone] and they will assist you.        Care EveryWhere ID     This is your Care EveryWhere ID. This could be used by other organizations to access your [place] medical records  [id number]        Your Vitals Were     Pulse Temperature Respirations Height BMI (Body Mass Index)       76 98  F (36.7  C) (Tympanic) 16 6' 2\" (1.88 m) 33.64 kg/m2        Blood Pressure from Last 3 Encounters:   10/03/18 118/64   08/16/17 114/75   08/02/17 115/75    Weight from Last 3 Encounters:   10/03/18 262 lb (118.8 kg)   08/16/17 258 lb (117 kg)   08/02/17 258 lb 6.4 oz (117.2 kg)              Today, you had the following     No orders found for display         Today's Medication Changes          These changes are accurate as of 10/3/18  3:57 PM.  If you have any questions, ask your nurse or doctor.               Start taking these medicines.        Dose/Directions    naltrexone-bupropion 8-90 MG per 12 hr tablet   Commonly known as:  CONTRAVE   Used for:  Obesity (BMI 30.0-34.9), Moderate episode of recurrent major depressive disorder (H)   Started by:  Tina Ramirez CNP        Take 1 tablet PO in a.m. X 1 week, then 1 tablet PO twice daily x 1 week, then 2 tabs PO q a.m. and 1 tab PO q p.m.   Quantity:  90 tablet   Refills:  0            Where to get your medicines      These medications were sent to [facility] [address] [place] [address]  [address] [facility] [address]     Phone:  [phone]     naltrexone-bupropion 8-90 MG per 12 hr tablet                Primary Care Provider Office Phone # Fax #    Tina Ramirez [phone]       [address] " [facility] [address]        Equal Access to Services     GIULIA HERRING : Hadii aad ku hadmanpreetkatharine Sorahat, waaxda luqadaha, qaybta kaalmada venkat, tracey sutton. So [facility] [phone].    ATENCIÓN: Si habla español, tiene a molina disposición servicios gratuitos de asistencia lingüística. Marlene al [phone].    We comply with applicable federal civil rights laws and [place] laws. We do not discriminate on the basis of race, color, national origin, age, disability, sex, sexual orientation, or gender identity.            Thank you!     Thank you for choosing [facility]  for your care. Our goal is always to provide you with excellent care. Hearing back from our patients is one way we can continue to improve our services. Please take a few minutes to complete the written survey that you may receive in the mail after your visit with us. Thank you!             Your Updated Medication List - Protect others around you: Learn how to safely use, store and throw away your medicines at www.disposemymeds.org.          This list is accurate as of 10/3/18  3:57 PM.  Always use your most recent med list.                   Brand Name Dispense Instructions for use Diagnosis    naltrexone-bupropion 8-90 MG per 12 hr tablet    CONTRAVE    90 tablet    Take 1 tablet PO in a.m. X 1 week, then 1 tablet PO twice daily x 1 week, then 2 tabs PO q a.m. and 1 tab PO q p.m.    Obesity (BMI 30.0-34.9), Moderate episode of recurrent major depressive disorder (H)       SUMAtriptan 25 MG tablet    IMITREX    30 tablet    Take 1-2 tablets (25-50 mg) by mouth at onset of headache May repeat in 1-2 hours after onset.    Chronic migraine without aura without status migrainosus, not intractable

## 2018-10-03 NOTE — PATIENT INSTRUCTIONS
1. Obesity (BMI 30.0-34.9)  Chronic, uncontrolled  - naltrexone-bupropion (CONTRAVE) 8-90 MG per 12 hr tablet; Take 1 tablet PO in a.m. X 1 week, then 1 tablet PO twice daily x 1 week, then 2 tabs PO q a.m. and 1 tab PO q p.m.  Dispense: 90 tablet; Refill: 0  Recheck in 30 days for weight recheck  Call me if you have any bad reaction to this  Vitamin D3 5, 000 IU fromAtoB brand or Nature Made    2. Moderate episode of recurrent major depressive disorder (H)  Chronic, stable  - naltrexone-bupropion (CONTRAVE) 8-90 MG per 12 hr tablet; Take 1 tablet PO in a.m. X 1 week, then 1 tablet PO twice daily x 1 week, then 2 tabs PO q a.m. and 1 tab PO q p.m.  Dispense: 90 tablet; Refill: 0  Recheck in weight

## 2018-10-03 NOTE — NURSING NOTE
"Chief Complaint   Patient presents with     Weight Problem     Depression       Initial /64 (BP Location: Right arm, Patient Position: Sitting, Cuff Size: Adult Large)  Pulse 76  Temp 98  F (36.7  C) (Tympanic)  Resp 16  Ht 6' 2\" (1.88 m)  Wt 262 lb (118.8 kg)  BMI 33.64 kg/m2 Estimated body mass index is 33.64 kg/(m^2) as calculated from the following:    Height as of this encounter: 6' 2\" (1.88 m).    Weight as of this encounter: 262 lb (118.8 kg).    Patient presents to the clinic using No DME    Health Maintenance that is potentially due pending provider review:  NONE    n/a    Is there anyone who you would like to be able to receive your results? No  If yes have patient fill out LUCERO    "

## 2018-10-04 ENCOUNTER — TELEPHONE (OUTPATIENT)
Dept: FAMILY MEDICINE | Facility: CLINIC | Age: 53
End: 2018-10-04

## 2018-10-04 ASSESSMENT — ANXIETY QUESTIONNAIRES: GAD7 TOTAL SCORE: 6

## 2018-10-04 ASSESSMENT — PATIENT HEALTH QUESTIONNAIRE - PHQ9: SUM OF ALL RESPONSES TO PHQ QUESTIONS 1-9: 12

## 2018-10-04 NOTE — TELEPHONE ENCOUNTER
Prior Authorization Retail Medication Request    Medication/Dose: CONTRAVE 8-90MG TAB  ICD code (if different than what is on RX):    Obesity (BMI 30.0-34.9) [E66.9]  - Primary       Moderate episode of recurrent major depressive disorder (H) [F33.1]       Previously Tried and Failed:    Rationale:      Insurance Name:  [employer] C  Insurance ID:  [id number]    COVER MY MEDS KEY: [id number]      Pharmacy Information (if different than what is on RX)  Name:  [facility]  Phone:  [phone]

## 2018-10-04 NOTE — TELEPHONE ENCOUNTER
CENTRAL PRIOR AUTHORIZATION  [phone]    PA Initiation    Medication: CONTRAVE 8-90MG TAB-INITIATED   Insurance Company: [employer] - Phone [phone] Fax [phone]  Pharmacy Filling the Rx: [facility] [address] [place] [address]  Filling Pharmacy Phone: [phone]  Filling Pharmacy Fax:    Start Date: 10/4/2018

## 2018-10-05 NOTE — TELEPHONE ENCOUNTER
Prior Authorization Approval    Authorization Effective Date: 10/4/2018  Authorization Expiration Date: 2/4/2019  Medication: CONTRAVE 8-90MG TAB-APPROVED   Approved Dose/Quantity:   Reference #: [id number]   Insurance Company: [employer] - Phone [phone] Fax [phone]  Expected CoPay:       CoPay Card Available:      Foundation Assistance Needed:    Which Pharmacy is filling the prescription (Not needed for infusion/clinic administered): [facility] [address]  Pharmacy Notified: Yes  Patient Notified: Yes

## 2018-10-22 ENCOUNTER — MYC MEDICAL ADVICE (OUTPATIENT)
Dept: FAMILY MEDICINE | Facility: CLINIC | Age: 53
End: 2018-10-22

## 2018-10-22 NOTE — TELEPHONE ENCOUNTER
Per 10-03-18 OV-    1. Obesity (BMI 30.0-34.9)  Chronic, uncontrolled  - naltrexone-bupropion (CONTRAVE) 8-90 MG per 12 hr tablet; Take 1 tablet PO in a.m. X 1 week, then 1 tablet PO twice daily x 1 week, then 2 tabs PO q a.m. and 1 tab PO q p.m.  Dispense: 90 tablet; Refill: 0  Recheck in 30 days for weight recheck (3 month dosing, maybe longer).   Call me if you have any bad reaction to this (Consider Escitalopram and Phentermine if he has side effects from Contrave).   Start Vitamin D3 5, 000 IU orderbolt or Nature Made

## 2018-10-31 ENCOUNTER — OFFICE VISIT (OUTPATIENT)
Dept: FAMILY MEDICINE | Facility: CLINIC | Age: 53
End: 2018-10-31
Payer: COMMERCIAL

## 2018-10-31 VITALS
HEART RATE: 74 BPM | BODY MASS INDEX: 31.79 KG/M2 | SYSTOLIC BLOOD PRESSURE: 110 MMHG | TEMPERATURE: 98 F | DIASTOLIC BLOOD PRESSURE: 68 MMHG | WEIGHT: 247.6 LBS

## 2018-10-31 DIAGNOSIS — E66.811 OBESITY (BMI 30.0-34.9): Primary | ICD-10-CM

## 2018-10-31 PROCEDURE — 99213 OFFICE O/P EST LOW 20 MIN: CPT | Performed by: NURSE PRACTITIONER

## 2018-10-31 RX ORDER — MULTIVITAMIN WITH IRON
1 TABLET ORAL DAILY
COMMUNITY
End: 2021-03-17

## 2018-10-31 ASSESSMENT — ANXIETY QUESTIONNAIRES
6. BECOMING EASILY ANNOYED OR IRRITABLE: NOT AT ALL
5. BEING SO RESTLESS THAT IT IS HARD TO SIT STILL: NOT AT ALL
3. WORRYING TOO MUCH ABOUT DIFFERENT THINGS: NOT AT ALL
2. NOT BEING ABLE TO STOP OR CONTROL WORRYING: NOT AT ALL
1. FEELING NERVOUS, ANXIOUS, OR ON EDGE: NOT AT ALL
7. FEELING AFRAID AS IF SOMETHING AWFUL MIGHT HAPPEN: NOT AT ALL
GAD7 TOTAL SCORE: 0

## 2018-10-31 ASSESSMENT — PATIENT HEALTH QUESTIONNAIRE - PHQ9
5. POOR APPETITE OR OVEREATING: NOT AT ALL
SUM OF ALL RESPONSES TO PHQ QUESTIONS 1-9: 4

## 2018-10-31 NOTE — MR AVS SNAPSHOT
After Visit Summary   10/31/2018    Karthik Bird    MRN: [id number]           Patient Information     Date Of Birth          1965        Visit Information        Provider Department      10/31/2018 8:40 AM Tina Ramirez CNP [facility]        Today's Diagnoses     Obesity (BMI 30.0-34.9)    -  1    Moderate episode of recurrent major depressive disorder (H)          Care Instructions    1. Obesity (BMI 30.0-34.9)  Chronic, improved  - naltrexone-bupropion (CONTRAVE) 8-90 MG per 12 hr tablet; Take 2 tabs bymouth every a.m. and 1 tab by mouth every p.m.  Dispense: 90 tablet; Refill: 0  Start a workout schedule- 40 minutes most days a week  Recheck in 1 month  Get schedule with well specialist    Wt Readings from Last 10 Encounters:   10/31/18 247 lb 9.6 oz (112.3 kg)   10/03/18 262 lb (118.8 kg)   08/16/17 258 lb (117 kg)   08/02/17 258 lb 6.4 oz (117.2 kg)   07/21/17 261 lb (118.4 kg)   09/20/16 235 lb 9.6 oz (106.9 kg)   01/14/16 216 lb (98 kg)   09/10/15 204 lb (92.5 kg)   06/23/15 218 lb (98.9 kg)   04/10/15 234 lb (106.1 kg)               Follow-ups after your visit        Follow-up notes from your care team     Return in about 1 month (around 11/30/2018).      Who to contact     If you have questions or need follow up information about today's clinic visit or your schedule please contact [facility] directly at [phone].  Normal or non-critical lab and imaging results will be communicated to you by MyChart, letter or phone within 4 business days after the clinic has received the results. If you do not hear from us within 7 days, please contact the clinic through MyChart or phone. If you have a critical or abnormal lab result, we will notify you by phone as soon as possible.  Submit refill requests through [employer] or call your pharmacy and they will forward the refill request to us. Please allow 3 business days for your refill to be completed.           Additional Information About Your Visit        Softec Internethart Information     MyStream gives you secure access to your electronic health record. If you see a primary care provider, you can also send messages to your care team and make appointments. If you have questions, please call your primary care clinic.  If you do not have a primary care provider, please call [phone] and they will assist you.        Care EveryWhere ID     This is your Care EveryWhere ID. This could be used by other organizations to access your [place] medical records  [id number]        Your Vitals Were     Pulse Temperature BMI (Body Mass Index)             74 98  F (36.7  C) (Tympanic) 31.79 kg/m2          Blood Pressure from Last 3 Encounters:   10/31/18 110/68   10/03/18 118/64   08/16/17 114/75    Weight from Last 3 Encounters:   10/31/18 247 lb 9.6 oz (112.3 kg)   10/03/18 262 lb (118.8 kg)   08/16/17 258 lb (117 kg)              Today, you had the following     No orders found for display         Today's Medication Changes          These changes are accurate as of 10/31/18  9:23 AM.  If you have any questions, ask your nurse or doctor.               These medicines have changed or have updated prescriptions.        Dose/Directions    naltrexone-bupropion 8-90 MG per 12 hr tablet   Commonly known as:  CONTRAVE   This may have changed:  additional instructions   Used for:  Obesity (BMI 30.0-34.9), Moderate episode of recurrent major depressive disorder (H)   Changed by:  Tina Ramirez CNP        Take 2 tabs bymouth every a.m. and 1 tab by mouth every p.m.   Quantity:  90 tablet   Refills:  0            Where to get your medicines      These medications were sent to [facility] [address] [place] [address] [address]  [address] [facility] [address]     Phone:  [phone]     naltrexone-bupropion 8-90 MG per 12 hr tablet                Primary Care Provider Office Phone # Fax #    Tina Ramirez [phone]  [phone]       [address] [facility] [address]        Equal Access to Services     GIULIA HERRING : Hadii aad ku hadmanpreetkatharine Sorahat, waericda lualfonzoadaha, qaybta kaashleighda jaspalricardomita, tracey myles rebeccadeisy daleykrystamarkell sutton. So [facility] [phone].    ATENCIÓN: Si habla español, tiene a molina disposición servicios gratuitos de asistencia lingüística. Llame al [phone].    We comply with applicable federal civil rights laws and [place] laws. We do not discriminate on the basis of race, color, national origin, age, disability, sex, sexual orientation, or gender identity.            Thank you!     Thank you for choosing [facility]  for your care. Our goal is always to provide you with excellent care. Hearing back from our patients is one way we can continue to improve our services. Please take a few minutes to complete the written survey that you may receive in the mail after your visit with us. Thank you!             Your Updated Medication List - Protect others around you: Learn how to safely use, store and throw away your medicines at www.disposemymeds.org.          This list is accurate as of 10/31/18  9:23 AM.  Always use your most recent med list.                   Brand Name Dispense Instructions for use Diagnosis    magnesium 250 MG tablet      Take 1 tablet by mouth daily        naltrexone-bupropion 8-90 MG per 12 hr tablet    CONTRAVE    90 tablet    Take 2 tabs bymouth every a.m. and 1 tab by mouth every p.m.    Obesity (BMI 30.0-34.9), Moderate episode of recurrent major depressive disorder (H)       SUMAtriptan 25 MG tablet    IMITREX    30 tablet    Take 1-2 tablets (25-50 mg) by mouth at onset of headache May repeat in 1-2 hours after onset.    Chronic migraine without aura without status migrainosus, not intractable       Vitamin D-3 5000 units Tabs

## 2018-10-31 NOTE — PROGRESS NOTES
"  SUBJECTIVE:   Karthik Bird is a 53 year old male who presents to clinic today for the following health issues:      WEIGHT GAIN      Duration: Since age 11    Description (location/character/radiation): Patient here to check in and get refill of contrave     Intensity:  NA    Accompanying signs and symptoms: none    History (similar episodes/previous evaluation): None    Precipitating or alleviating factors: None    Therapies tried and outcome: Contrave     Side effects: dry mouth, reduced sugar craving, better sleep, better erections    No exercising. \"I have kids I have to truck around\". \"There was a stressful situation at work this month- people were trying to have me fired\". 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    He is really pushing to be on a pill instead of doing the work- exercising, eating healthy, learning behavior modifications re: sugar addiction     Wt Readings from Last 10 Encounters:   10/31/18 247 lb 9.6 oz (112.3 kg)   10/03/18 262 lb (118.8 kg)   08/16/17 258 lb (117 kg)   08/02/17 258 lb 6.4 oz (117.2 kg)   07/21/17 261 lb (118.4 kg)   09/20/16 235 lb 9.6 oz (106.9 kg)   01/14/16 216 lb (98 kg)   09/10/15 204 lb (92.5 kg)   06/23/15 218 lb (98.9 kg)   04/10/15 234 lb (106.1 kg)       Depression    Duration: off and on for years- has improved with Contrave    Description:  Depression: YES  Anxiety: no   Panic attacks: no      Accompanying signs and symptoms: see PHQ-9 and MACKENZIE scores    History (similar episodes/previous evaluation): None    Precipitating or alleviating factors: None    Therapies tried and outcome: Contrave    PHQ-9 SCORE 10/3/2018 10/31/2018   Total Score 12 4     MACKENZIE-7 SCORE 10/3/2018 10/31/2018   Total Score 6 0     HPI:   PCP:  Tina Ramirez, [facility] [phone]    Patient Active Problem List   Diagnosis     Chronic migraine without aura, not intractable     Colon polyp     CARDIOVASCULAR SCREENING; LDL GOAL LESS THAN 160     Obesity (BMI 30.0-34.9)     ED " (erectile dysfunction)     H/O rotator cuff tear     Current Outpatient Prescriptions   Medication     Cholecalciferol (VITAMIN D-3) 5000 units TABS     magnesium 250 MG tablet     naltrexone-bupropion (CONTRAVE) 8-90 MG per 12 hr tablet     SUMAtriptan (IMITREX) 25 MG tablet     No current facility-administered medications for this visit.        Health Maintenance Due   Topic Date Due     DEPRESSION ACTION PLAN Q1 YR  05/04/1982     HIV SCREEN (SYSTEM ASSIGNED)  05/04/1982     HEPATITIS C SCREENING  05/04/1982       Reviewed and updated:  Tobacco  Allergies  Meds  Med Hx  Surg Hx  Fam Hx  Soc Hx     ROS:  Constitutional, HEENT, cardiovascular, pulmonary, gi and gu systems are negative, except as otherwise noted.    PHYSICAL EXAM:   /68 (BP Location: Right arm, Patient Position: Sitting, Cuff Size: Adult Large)  Pulse 74  Temp 98  F (36.7  C) (Tympanic)  Wt 247 lb 9.6 oz (112.3 kg)  BMI 31.79 kg/m2  Body mass index is 31.79 kg/(m^2).  GENERAL APPEARANCE: healthy, alert, no distress and over weight  RESP: lungs clear to auscultation - no rales, rhonchi or wheezes  CV: regular rates and rhythm, normal S1 S2, no S3 or S4 and no murmur, click or rub  PSYCH: mentation appears normal and affect normal/bright    ASSESSMENT & PLAN:     1. Obesity (BMI 30.0-34.9)  Chronic, improved  - naltrexone-bupropion (CONTRAVE) 8-90 MG per 12 hr tablet; Take 2 tabs bymouth every a.m. and 1 tab by mouth every p.m.  Dispense: 90 tablet; Refill: 0  Start a workout schedule- 40 minutes most days a week. [employer] -[employer]  Recheck in 1 month  Get scheduled with  (weight clinic)    Wt Readings from Last 10 Encounters:   10/31/18 247 lb 9.6 oz (112.3 kg)   10/03/18 262 lb (118.8 kg)   08/16/17 258 lb (117 kg)   08/02/17 258 lb 6.4 oz (117.2 kg)   07/21/17 261 lb (118.4 kg)   09/20/16 235 lb 9.6 oz (106.9 kg)   01/14/16 216 lb (98 kg)   09/10/15 204 lb(92.5 kg)   06/23/15 218 lb (98.9 kg)   04/10/15 234 lb  (106.1 kg)       Risks, benefits, side effects and rationale for treatment plan fully discussed with the patient and understanding expressed.    Tina Ramirez, FNP-BC  [facility]

## 2018-10-31 NOTE — PATIENT INSTRUCTIONS
1. Obesity (BMI 30.0-34.9)  Chronic, improved  - naltrexone-bupropion (CONTRAVE) 8-90 MG per 12 hr tablet; Take 2 tabs bymouth every a.m. and 1 tab by mouth every p.m.  Dispense: 90 tablet; Refill: 0  Start a workout schedule- 40 minutes most days a week  Recheck in 1 month  Get schedule with well specialist    Wt Readings from Last 10 Encounters:   10/31/18 247 lb 9.6 oz (112.3 kg)   10/03/18 262 lb (118.8 kg)   08/16/17 258 lb (117 kg)   08/02/17 258 lb 6.4 oz (117.2 kg)   07/21/17 261 lb (118.4 kg)   09/20/16 235 lb 9.6 oz (106.9 kg)   01/14/16 216 lb (98 kg)   09/10/15 204 lb (92.5 kg)   06/23/15 218 lb (98.9 kg)   04/10/15 234 lb (106.1 kg)

## 2018-10-31 NOTE — NURSING NOTE
"Chief Complaint   Patient presents with     Weight Check     Depression       Initial /68 (BP Location: Right arm, Patient Position: Sitting, Cuff Size: Adult Large)  Pulse 74  Temp 98  F (36.7  C) (Tympanic)  Wt 247 lb 9.6 oz (112.3 kg)  BMI 31.79 kg/m2 Estimated body mass index is 31.79 kg/(m^2) as calculated from the following:    Height as of 10/3/18: 6' 2\" (1.88 m).    Weight as of this encounter: 247 lb 9.6 oz (112.3 kg).    Patient presents to the clinic using No DME    Health Maintenance that is potentially due pending provider review:  NONE    n/a    Is there anyone who you would like to be able to receive your results? No  If yes have patient fill out LUCERO      "

## 2018-11-01 ASSESSMENT — ANXIETY QUESTIONNAIRES: GAD7 TOTAL SCORE: 0

## 2018-11-13 ENCOUNTER — OFFICE VISIT (OUTPATIENT)
Dept: FAMILY MEDICINE | Facility: CLINIC | Age: 53
End: 2018-11-13
Payer: COMMERCIAL

## 2018-11-13 ENCOUNTER — MYC MEDICAL ADVICE (OUTPATIENT)
Dept: FAMILY MEDICINE | Facility: CLINIC | Age: 53
End: 2018-11-13

## 2018-11-13 VITALS
RESPIRATION RATE: 20 BRPM | SYSTOLIC BLOOD PRESSURE: 116 MMHG | DIASTOLIC BLOOD PRESSURE: 68 MMHG | HEART RATE: 80 BPM | TEMPERATURE: 97.8 F

## 2018-11-13 DIAGNOSIS — R09.81 SINUS CONGESTION: Primary | ICD-10-CM

## 2018-11-13 LAB
ERYTHROCYTE [DISTWIDTH] IN BLOOD BY AUTOMATED COUNT: 12.8 % (ref 10–15)
HCT VFR BLD AUTO: 45.2 % (ref 40–53)
HGB BLD-MCNC: 15.6 G/DL (ref 13.3–17.7)
MCH RBC QN AUTO: 29.8 PG (ref 26.5–33)
MCHC RBC AUTO-ENTMCNC: 34.5 G/DL (ref 31.5–36.5)
MCV RBC AUTO: 86 FL (ref 78–100)
PLATELET # BLD AUTO: 284 10E9/L (ref 150–450)
RBC # BLD AUTO: 5.23 10E12/L (ref 4.4–5.9)
WBC # BLD AUTO: 7.6 10E9/L (ref 4–11)

## 2018-11-13 PROCEDURE — 36415 COLL VENOUS BLD VENIPUNCTURE: CPT | Performed by: NURSE PRACTITIONER

## 2018-11-13 PROCEDURE — 99213 OFFICE O/P EST LOW 20 MIN: CPT | Performed by: NURSE PRACTITIONER

## 2018-11-13 PROCEDURE — 85027 COMPLETE CBC AUTOMATED: CPT | Performed by: NURSE PRACTITIONER

## 2018-11-13 NOTE — NURSING NOTE
"Chief Complaint   Patient presents with     Sinus Problem       Initial /68 (BP Location: Right arm, Patient Position: Sitting, Cuff Size: Adult Large)  Pulse 80  Temp 97.8  F (36.6  C) (Tympanic)  Resp 20 Estimated body mass index is 31.79 kg/(m^2) as calculated from the following:    Height as of 10/3/18: 6' 2\" (1.88 m).    Weight as of 10/31/18: 247 lb 9.6 oz (112.3 kg).    Patient presents to the clinic using No DME    Health Maintenance that is potentially due pending provider review:  NONE    n/a    Is there anyone who you would like to be able to receive your results? No  If yes have patient fill out LUCERO    "

## 2018-11-13 NOTE — PATIENT INSTRUCTIONS
1. Sinus congestion  Chronic, stable  - CBC with platelets  Start over-the-counter decongestants  Start over-the-counter daily sinus rinse  Start over-the-counter Flonase 2 sprays up each nostril daily  Follow instructions below    Results for orders placed or performed in visit on 11/13/18   CBC with platelets   Result Value Ref Range    WBC 7.6 4.0 - 11.0 10e9/L    RBC Count 5.23 4.4 - 5.9 10e12/L    Hemoglobin 15.6 13.3 - 17.7 g/dL    Hematocrit 45.2 40.0 - 53.0 %    MCV 86 78 - 100 fl    MCH 29.8 26.5 - 33.0 pg    MCHC 34.5 31.5 - 36.5 g/dL    RDW 12.8 10.0 - 15.0 %    Platelet Count 284 150 - 450 10e9/L       Nasal congestion  The sinuses are air-filled spaces within the bones of the face. They connect to the inside of the nose. Sinusitis is an inflammation of the tissue lining the sinus cavity. Sinus inflammation can occur during a cold or hay-fever (allergies to pollens and other particles in the air) and cause symptoms of sinus congestion and fullness and perhaps a low-grade fever. These symptoms can last up to 7-10 days. This does not require antibiotic treatment.  Home Care:  1. Drink plenty of water, hot tea, and other liquids to stay well hydrated. This thins the mucus and promotes sinus drainage.  2. Apply heat to the painful areas of the face. Use a towel soaked in hot water. Or,  the shower and direct the hot spray onto your face. This is a good way to inhale warm water vapor and get heat on your face at the same time. (Cover your mouth and nose with your hands so you can still breathe as you do this.)  3. Use a vaporizer with products such as Vicks VapoRub (contains menthol) at night. Suck on peppermint, menthol or eucalyptus hard candies during the day.  4. An expectorant containing guaifenesin (such as Robitussin), helps to thin the mucus and promote drainage from the sinuses.  5. Over-the-counter decongestants may be used unless a similar medicine was prescribed. Nasal sprays work the  fastest. Use one that contains phenylephrine (Jeison-synephrine, Sinex, Flonase or others). First blow the nose gently to remove mucus, then apply the drops. Do not use these medicines more often than directed on the label or for more than three days or symptoms may worsen. You may also use tablets containing pseudoephedrine (Sudafed). Many sinus remedies combine ingredients, which may increase side effects. Read the labels or ask the pharmacist for help. NOTE: Persons with high blood pressure should not use decongestants. They can raise blood pressure.  Look at CORICIDIN products instead.  6. Antihistamines are useful if allergies are a cause of your sinusitis. The mildest one is chlorpheniramine (available without a prescription). The dose for adults is 8-12mg three times a day. [NOTE: Do not use chlorpheniramine if you have glaucoma or if you are a man with trouble urinating due to an enlarged prostate.] Claritin (loratidine) is an antihistamine that causes less drowsiness and is a good alternative for daytime use.  7. When allergies are the cause for sinusitis, a saline nasal rinse alone may give relief. Saline nasal rinse reduces swelling and clears excess mucus. This allows sinuses to drain. Pre-packaged cans are available at most drug stores. These contain pre-mixed salt in an irrigation device.  8. You may use acetaminophen (Tylenol) or ibuprofen (Motrin, Advil) to control pain, unless another pain medicine was prescribed. [ NOTE: If you have chronic liver or kidney disease or ever had a stomach ulcer, talk with your doctor before using these medicines.] (Aspirin should never be used in anyone under 18 years of age who is ill with a fever. It may cause severe liver damage.)  9. Using a Neti pot, or pre-mixed nasal saline rinse daily. This will help clear your sinuses of mucous:  Nasal saline rinses  Rinses help keep your sinuses and nose moist and flush out mucous. Use a pre-mixed rinse. Take a steam shower  first. Then tilt your head to one side and spray. Allow the rinse to flow for a little bit to get into all the crevices. Then blow your nose. Repeat on the other nostril.  Humidification will also help- steam your head for 10 minutes, take a steam shower for 10 minutes, and ensure your dwelling has sufficient humidification.      Medications  Your doctor may prescribe medications to help treat your sinusitis. If you have an infection, antibiotics can help clear it up. If you are prescribed antibiotics, take all pills on schedule until they are gone, even if you feel better. Decongestants help relieve swelling. Use decongestant sprays for short periods only under the direction of your doctor. If you have allergies, your doctor may prescribe medications to help relieve them.   Decongestants- over-the-counter Pseudophedrine  Nasal corticosteroid spray- Nasonex, Flonase  Antihistamine- over-the-counter Claritin, Allegra, Zyrtec etc...   Apply Hot or Cold Packs  Applying heat to the area surrounding your sinuses may make you feel more comfortable. Use a hot water bottle or a hand towel dipped in hot water. Some people also find ice packs effective for relieving pain.  Get Prompt Medical Attention  if any of the following occur:    Worsening sinus pain or headache    Stiff neck    Unusual drowsiness, confusion or not acting like your normal self    Swelling of the forehead or eyelids    Vision problems including blurred or double vision    Fever of 100.4 F (38 C) or higher, or as directed by your healthcare provider    Seizure    7055-8960 [facility], [address] [place] [address], [place] [address]. All rights reserved. This information is not intended as a substitute for professional medical care. Always follow your healthcare professional's instructions.

## 2018-11-13 NOTE — PROGRESS NOTES
SUBJECTIVE:   Karthik Bird is a 53 year old male who presents to clinic today for the following health issues:       SYMPTOMS      Duration: 3 weeks     Description  nasal congestion, facial pain/pressure and headache    Severity: Not resolving     Accompanying signs and symptoms: None    History (predisposing factors):  none    Precipitating or alleviating factors: None    Therapies tried and outcome:  Mucinex and Zycam     HPI:   PCP:  Tina Ramirez, [phone]    Patient Active Problem List   Diagnosis     Chronic migraine without aura, not intractable     Colon polyp     CARDIOVASCULAR SCREENING; LDL GOAL LESS THAN 160     Obesity (BMI 30.0-34.9)     ED (erectile dysfunction)     H/O rotator cuff tear     Current Outpatient Prescriptions   Medication     Cholecalciferol (VITAMIN D-3) 5000 units TABS     magnesium 250 MG tablet     naltrexone-bupropion (CONTRAVE) 8-90 MG per 12 hr tablet     SUMAtriptan (IMITREX) 25 MG tablet     No current facility-administered medications for this visit.        Health Maintenance Due   Topic Date Due     DEPRESSION ACTION PLAN Q1 YR  05/04/1982     HIV SCREEN (SYSTEM ASSIGNED)  05/04/1982     HEPATITIS C SCREENING  05/04/1982     Reviewed and updated:  Tobacco  Allergies  Meds  Med Hx  Surg Hx  Fam Hx  Soc Hx     ROS:  Constitutional, HEENT, cardiovascular, pulmonary, gi and gu systems are negative, except as otherwise noted.    PHYSICAL EXAM:   /68 (BP Location: Right arm, Patient Position: Sitting, Cuff Size: Adult Large)  Pulse 80  Temp 97.8  F (36.6  C) (Tympanic)  Resp 20  There is no height or weight on file to calculate BMI.  GENERAL APPEARANCE: healthy, alert, no distress and over weight  HENT: ear canals and TM's normal and nose and mouth without ulcers or lesions  PSYCH: mentation appears normal and affect normal/bright    Results for orders placed or performed in visit on 11/13/18   CBC with platelets   Result Value Ref Range    WBC 7.6  4.0 - 11.0 10e9/L    RBC Count 5.23 4.4 - 5.9 10e12/L    Hemoglobin 15.6 13.3 - 17.7 g/dL    Hematocrit 45.2 40.0 - 53.0 %    MCV 86 78 - 100 fl    MCH 29.8 26.5 - 33.0 pg    MCHC 34.5 31.5 - 36.5 g/dL    RDW 12.8 10.0 - 15.0 %    Platelet Count 284 150 - 450 10e9/L       ASSESSMENT & PLAN:     1. Sinus congestion  Chronic, stable  - CBC with platelets  Start over-the-counter decongestants  Start over-the-counter daily sinus rinse  Start over-the-counter Flonase 2 sprays up each nostril daily  Follow instructions below    Results for orders placed or performed in visit on 11/13/18   CBC with platelets   Result Value Ref Range    WBC 7.6 4.0 - 11.0 10e9/L    RBC Count 5.23 4.4 - 5.9 10e12/L    Hemoglobin 15.6 13.3 - 17.7 g/dL    Hematocrit 45.2 40.0 - 53.0 %    MCV 86 78 - 100 fl    MCH 29.8 26.5 - 33.0 pg    MCHC 34.5 31.5 - 36.5 g/dL    RDW 12.8 10.0 - 15.0 %    Platelet Count 284 150 - 450 10e9/L       Nasal congestion  The sinuses are air-filled spaces within the bones of the face. They connect to the inside of the nose. Sinusitis is an inflammation of the tissue lining the sinus cavity. Sinus inflammation can occur during a cold or hay-fever (allergies to pollens and other particles in the air) and cause symptoms of sinus congestion and fullness and perhaps a low-grade fever. These symptoms can last up to 7-10 days. This does not require antibiotic treatment.  Home Care:  1. Drink plenty of water, hot tea, and other liquids to stay well hydrated. This thins the mucus and promotes sinus drainage.  2. Apply heat to the painful areas of the face. Use a towel soaked in hot water. Or,  the shower and direct the hot spray onto your face. This is a good way to inhale warm water vapor and get heat on your face at the same time. (Cover your mouth and nose with your hands so you can still breathe as you do this.)  3. Use a vaporizer with products such as Vicks VapoRub (contains menthol) at night. Suck on  peppermint, menthol or eucalyptus hard candies during the day.  4. An expectorant containing guaifenesin (such as Robitussin), helps to thin the mucus and promote drainage from the sinuses.  5. Over-the-counter decongestants may be used unless a similar medicine was prescribed. Nasal sprays work the fastest. Use one that contains phenylephrine (Jeison-synephrine, Sinex, Flonase or others). First blow the nose gently to remove mucus, then apply the drops. Do not use these medicines more often than directed on the label or for more than three days or symptoms may worsen. You may also use tablets containing pseudoephedrine (Sudafed). Many sinus remedies combine ingredients, which may increase side effects. Read the labels or ask the pharmacist for help. NOTE: Persons with high blood pressure should not use decongestants. They can raise blood pressure.  Look at CORICIDIN products instead.  6. Antihistamines are useful if allergies are a cause of your sinusitis. The mildest one is chlorpheniramine (available without a prescription). The dose for adults is 8-12mg three times a day. [NOTE: Do not use chlorpheniramine if you have glaucoma or if you are a man with trouble urinating due to an enlarged prostate.] Claritin (loratidine) is an antihistamine that causes less drowsiness and is a good alternative for daytime use.  7. When allergies are the cause for sinusitis, a saline nasal rinse alone may give relief. Saline nasal rinse reduces swelling and clears excess mucus. This allows sinuses to drain. Pre-packaged cans are available at most drug stores. These contain pre-mixed salt in an irrigation device.  8. You may use acetaminophen (Tylenol) or ibuprofen (Motrin, Advil) to control pain, unless another pain medicine was prescribed. [ NOTE: If you have chronic liver or kidney disease or ever had a stomach ulcer, talk with your doctor before using these medicines.] (Aspirin should never be used in anyone under 18 years of age  who is ill with a fever. It may cause severe liver damage.)  9. Using a Neti pot, or pre-mixed nasal saline rinse daily. This will help clear your sinuses of mucous:  Nasal saline rinses  Rinses help keep your sinuses and nose moist and flush out mucous. Use a pre-mixed rinse. Take a steam shower first. Then tilt your head to one side and spray. Allow the rinse to flow for a little bit to get into all the crevices. Then blow your nose. Repeat on the other nostril.  Humidification will also help- steam your head for 10 minutes, take a steam shower for 10 minutes, and ensure your dwelling has sufficient humidification.      Medications  Your doctor may prescribe medications to help treat your sinusitis. If you have an infection, antibiotics can help clear it up. If you are prescribed antibiotics, take all pills on schedule until they are gone, even if you feel better. Decongestants help relieve swelling. Use decongestant sprays for short periods only under the direction of your doctor. If you have allergies, your doctor may prescribe medications to help relieve them.   Decongestants- over-the-counter Pseudophedrine  Nasal corticosteroid spray- Nasonex, Flonase  Antihistamine- over-the-counter Claritin, Allegra, Zyrtec etc...   Apply Hot or Cold Packs  Applying heat to the area surrounding your sinuses may make you feel more comfortable. Use a hot water bottle or a hand towel dipped in hot water. Some people also find ice packs effective for relieving pain.  Get Prompt Medical Attention  if any of the following occur:    Worsening sinus pain or headache    Stiff neck    Unusual drowsiness, confusion or not acting like your normal self    Swelling of the forehead or eyelids    Vision problems including blurred or double vision    Fever of 100.4 F (38 C) or higher, or as directed by your healthcare provider    Seizure    [id number] [employer], [address] [facility], [place] [address]. All rights reserved. This  information is not intended as a substitute for professional medical care. Always follow your healthcare professional's instructions.            Risks, benefits, side effects and rationale for treatment plan fully discussed with the patient and understanding expressed.    [facility]

## 2018-11-13 NOTE — LETTER
My Depression Action Plan  Name: Karthik Bird   Date of Birth 1965  Date: 11/13/2018    My doctor: Tina Ramirez   My clinic: [address] [address]  [phone]          GREEN    ZONE   Good Control    What it looks like:     Things are going generally well. You have normal up s and down s. You may even feel depressed from time to time, but bad moods usually last less than a day.   What you need to do:  1. Continue to care for yourself (see self care plan)  2. Check your depression survival kit and update it as needed  3. Follow your physician s recommendations including any medication.  4. Do not stop taking medication unless you consult with your physician first.           YELLOW         ZONE Getting Worse    What it looks like:     Depression is starting to interfere with your life.     It may be hard to get out of bed; you may be starting to isolate yourself from others.    Symptoms of depression are starting to last most all day and this has happened for several days.     You may have suicidal thoughts but they are not constant.   What you need to do:     1. Call your care team, your response to treatment will improve if you keep your care team informed of your progress. Yellow periods are signs an adjustment may need to be made.     2. Continue your self-care, even if you have to fake it!    3. Talk to someone in your support network    4. Open up your depression survival kit           RED    ZONE Medical Alert - Get Help    What it looks like:     Depression is seriously interfering with your life.     You may experience these or other symptoms: You can t get out of bed most days, can t work or engage in other necessary activities, you have trouble taking care of basic hygiene, or basic responsibilities, thoughts of suicide or death that will not go away, self-injurious behavior.     What you need to do:  1. Call your care team and  request a same-day appointment. If they are not available (weekends or after hours) call your local crisis line, emergency room or 911.            Depression Self Care Plan / Survival Kit    Self-Care for Depression  Here s the deal. Your body and mind are really not as separate as most people think.  What you do and think affects how you feel and how you feel influences what you do and think. This means if you do things that people who feel good do, it will help you feel better.  Sometimes this is all it takes.  There is also a place for medication and therapy depending on how severe your depression is, so be sure to consult with your medical provider and/ or Behavioral Health Consultant if your symptoms are worsening or not improving.     In order to better manage my stress, I will:    Exercise  Get some form of exercise, every day. This will help reduce pain and release endorphins, the  feel good  chemicals in your brain. This is almost as good as taking antidepressants!  This is not the same as joining a gym and then never going! (they count on that by the way ) It can be as simple as just going for a walk or doing some gardening, anything that will get you moving.      Hygiene   Maintain good hygiene (Get out of bed in the morning, Make your bed, Brush your teeth, Take a shower, and Get dressed like you were going to work, even if you are unemployed).  If your clothes don't fit try to get ones that do.    Diet  I will strive to eat foods that are good for me, drink plenty of water, and avoid excessive sugar, caffeine, alcohol, and other mood-altering substances.  Some foods that are helpful in depression are: complex carbohydrates, B vitamins, flaxseed, fish or fish oil, fresh fruits and vegetables.    Psychotherapy  I agree to participate in Individual Therapy (if recommended).    Medication  If prescribed medications, I agree to take them.  Missing doses can result in serious side effects.  I understand that  drinking alcohol, or other illicit drug use, may cause potential side effects.  I will not stop my medication abruptly without first discussing it with my provider.    Staying Connected With Others  I will stay in touch with my friends, family members, and my primary care provider/team.    Use your imagination  Be creative.  We all have a creative side; it doesn t matter if it s oil painting, sand castles, or mud pies! This will also kick up the endorphins.    Witness Beauty  (AKA stop and smell the roses) Take a look outside, even in mid-winter. Notice colors, textures. Watch the squirrels and birds.     Service to others  Be of service to others.  There is always someone else in need.  By helping others we can  get out of ourselves  and remember the really important things.  This also provides opportunities for practicing all the other parts of the program.    Humor  Laugh and be silly!  Adjust your TV habits for less news and crime-drama and more comedy.    Control your stress  Try breathing deep, massage therapy, biofeedback, and meditation. Find time to relax each day.     My support system    Clinic Contact:  Phone number:    Contact 1:  Phone number:    Contact 2:  Phone number:    Church/:  Phone number:    Therapist:  Phone number:    Local crisis center:    Phone number:    Other community support:  Phone number:

## 2018-11-13 NOTE — MR AVS SNAPSHOT
After Visit Summary   11/13/2018    Karthik Bird    MRN: [id number]           Patient Information     Date Of Birth          1965        Visit Information        Provider Department      11/13/2018 2:40 PM Tina Ramirez, [facility]        Today's Diagnoses     Sinus congestion    -  1      Care Instructions    1. Sinus congestion  Chronic, stable  - CBC with platelets  Start over-the-counter decongestants  Start over-the-counter daily sinus rinse  Start over-the-counter Flonase 2 sprays up each nostril daily  Follow instructions below    Results for orders placed or performed in visit on 11/13/18   CBC with platelets   Result Value Ref Range    WBC 7.6 4.0 - 11.0 10e9/L    RBC Count 5.23 4.4 - 5.9 10e12/L    Hemoglobin 15.6 13.3 - 17.7 g/dL    Hematocrit 45.2 40.0 - 53.0 %    MCV 86 78 - 100 fl    MCH 29.8 26.5 - 33.0 pg    MCHC 34.5 31.5 - 36.5 g/dL    RDW 12.8 10.0 - 15.0 %    Platelet Count 284 150 - 450 10e9/L       Nasal congestion  The sinuses are air-filled spaces within the bones of the face. They connect to the inside of the nose. Sinusitis is an inflammation of the tissue lining the sinus cavity. Sinus inflammation can occur during a cold or hay-fever (allergies to pollens and other particles in the air) and cause symptoms of sinus congestion and fullness and perhaps a low-grade fever. These symptoms can last up to 7-10 days. This does not require antibiotic treatment.  Home Care:  1. Drink plenty of water, hot tea, and other liquids to stay well hydrated. This thins the mucus and promotes sinus drainage.  2. Apply heat to the painful areas of the face. Use a towel soaked in hot water. Or,  the shower and direct the hot spray onto your face. This is a good way to inhale warm water vapor and get heat on your face at the same time. (Cover your mouth and nose with your hands so you can still breathe as you do this.)  3. Use a vaporizer with products such  as Vicks VapoRub (contains menthol) at night. Suck on peppermint, menthol or eucalyptus hard candies during the day.  4. An expectorant containing guaifenesin (such as Robitussin), helps to thin the mucus and promote drainage from the sinuses.  5. Over-the-counter decongestants may be used unless a similar medicine was prescribed. Nasal sprays work the fastest. Use one that contains phenylephrine (Jeison-synephrine, Sinex, Flonase or others). First blow the nose gently to remove mucus, then apply the drops. Do not use these medicines more often than directed on the label or for more than three days or symptoms may worsen. You may also use tablets containing pseudoephedrine (Sudafed). Many sinus remedies combine ingredients, which may increase side effects. Read the labels or ask the pharmacist for help. NOTE: Persons with high blood pressure should not use decongestants. They can raise blood pressure.  Look at CORICIDIN products instead.  6. Antihistamines are useful if allergies are a cause of your sinusitis. The mildest one is chlorpheniramine (available without a prescription). The dose for adults is 8-12mg three times a day. [NOTE: Do not use chlorpheniramine if you have glaucoma or if you are a man with trouble urinating due to an enlarged prostate.] Claritin (loratidine) is an antihistamine that causes less drowsiness and is a good alternative for daytime use.  7. When allergies are the cause for sinusitis, a saline nasal rinse alone may give relief. Saline nasal rinse reduces swelling and clears excess mucus. This allows sinuses to drain. Pre-packaged cans are available at most drug stores. These contain pre-mixed salt in an irrigation device.  8. You may use acetaminophen (Tylenol) or ibuprofen (Motrin, Advil) to control pain, unless another pain medicine was prescribed. [ NOTE: If you have chronic liver or kidney disease or ever had a stomach ulcer, talk with your doctor before using these medicines.] (Aspirin  should never be used in anyone under 18 years of age who is ill with a fever. It may cause severe liver damage.)  9. Using a Neti pot, or pre-mixed nasal saline rinse daily. This will help clear your sinuses of mucous:  Nasal saline rinses  Rinses help keep your sinuses and nose moist and flush out mucous. Use a pre-mixed rinse. Take a steam shower first. Then tilt your head to one side and spray. Allow the rinse to flow for a little bit to get into all the crevices. Then blow your nose. Repeat on the other nostril.  Humidification will also help- steam your head for 10 minutes, take a steam shower for 10 minutes, and ensure your dwelling has sufficient humidification.      Medications  Your doctor may prescribe medications to help treat your sinusitis. If you have an infection, antibiotics can help clear it up. If you are prescribed antibiotics, take all pills on schedule until they are gone, even if you feel better. Decongestants help relieve swelling. Use decongestant sprays for short periods only under the direction of your doctor. If you have allergies, your doctor may prescribe medications to help relieve them.   Decongestants- over-the-counter Pseudophedrine  Nasal corticosteroid spray- Nasonex, Flonase  Antihistamine- over-the-counter Claritin, Allegra, Zyrtec etc...   Apply Hot or Cold Packs  Applying heat to the area surrounding your sinuses may make you feel more comfortable. Use a hot water bottle or a hand towel dipped in hot water. Some people also find ice packs effective for relieving pain.  Get Prompt Medical Attention  if any of the following occur:    Worsening sinus pain or headache    Stiff neck    Unusual drowsiness, confusion or not acting like your normal self    Swelling of the forehead or eyelids    Vision problems including blurred or double vision    Fever of 100.4 F (38 C) or higher, or as directed by your healthcare provider    Seizure    [id number] [employer]sl[employer], [address] [place] [address]  Road, [place] [address]. All rights reserved. This information is not intended as a substitute for professional medical care. Always follow your healthcare professional's instructions.                  Follow-ups after your visit        Follow-up notes from your care team     Return in about 1 week (around 11/20/2018), or if symptoms worsen or fail to improve.      Who to contact     If you have questions or need follow up information about today's clinic visit or your schedule please contact [facility] directly at [phone].  Normal or non-critical lab and imaging results will be communicated to you by [employer], letter or phone within 4 business days after the clinic has received the results. If you do not hear from us within 7 days, please contact the clinic through PhaseRxt or phone. If you have a critical or abnormal lab result, we will notify you by phone as soon as possible.  Submit refill requests through Lingorami or call your pharmacy and they will forward the refill request to us. Please allow 3 business days for your refill to be completed.          Additional Information About Your Visit        [employer] Information     Ling[employer]ami gives you secure access to your electronic health record. If you see a primary care provider, you can also send messages to your care team and make appointments. If you have questions, please call your primary care clinic.  If you do not have a primary care provider, please call [phone] and they will assist you.        Care EveryWhere ID     This is your Care EveryWhere ID. This could be used by other organizations to access your [place] medical records  [id number]        Your Vitals Were     Pulse Temperature Respirations             80 97.8  F (36.6  C) (Tympanic) 20          Blood Pressure from Last 3 Encounters:   11/13/18 116/68   10/31/18 110/68   10/03/18 118/64    Weight from Last 3 Encounters:   10/31/18 247 lb 9.6 oz (112.3 kg)   10/03/18 262 lb  (118.8 kg)   08/16/17 258 lb (117 kg)              We Performed the Following     CBC with platelets     DEPRESSION ACTION PLAN (DAP)        Primary Care Provider Office Phone # Fax #    Tina Ramirez, KATIE [phone]       [address] [facility] [address]        Equal Access to Services     GIULIA HERRING : Hadii aad ku hadasho Soomaali, waaxda luqadaha, qaybta kaalmada adeegyada, waxay idiin hayaan adecarlene khkrystash lasonali . So [facility] [phone].    ATENCIÓN: Si habla español, tiene a molina disposición servicios gratuitos de asistencia lingüística. Marlene al [phone].    We comply with applicable federal civil rights laws and [place] laws. We do not discriminate on the basis of race, color, national origin, age, disability, sex, sexual orientation, or gender identity.            Thank you!     Thank you for choosing [facility]  for your care. Our goal is always to provide you with excellent care. Hearing back from our patients is one way we can continue to improve our services. Please take a few minutes to complete the written survey that you may receive in the mail after your visit with us. Thank you!             Your Updated Medication List - Protect others around you: Learn how to safely use, store and throw away your medicines at www.disposemymeds.org.          This list is accurate as of 11/13/18  3:05 PM.  Always use your most recent med list.                   Brand Name Dispense Instructions for use Diagnosis    magnesium 250 MG tablet      Take 1 tablet by mouth daily        naltrexone-bupropion 8-90 MG per 12 hr tablet    CONTRAVE    90 tablet    Take 2 tabs bymouth every a.m. and 1 tab by mouth every p.m.    Obesity (BMI 30.0-34.9)       SUMAtriptan 25 MG tablet    IMITREX    30 tablet    Take 1-2 tablets (25-50 mg) by mouth at onset of headache May repeat in 1-2 hours after onset.    Chronic migraine without aura without status migrainosus, not intractable        Vitamin D-3 5000 units Tabs

## 2019-03-19 ENCOUNTER — MYC MEDICAL ADVICE (OUTPATIENT)
Dept: FAMILY MEDICINE | Facility: CLINIC | Age: 54
End: 2019-03-19

## 2019-03-19 NOTE — TELEPHONE ENCOUNTER
Panel Management Review      Patient has the following on his problem list:     Depression / Dysthymia review    Measure:  Needs PHQ-9 score of 4 or less during index window.  Administer PHQ-9 and if score is 5 or more, send encounter to provider for next steps.    5 - 7 month window range: 02/03/2019 to 06/03/2019    PHQ-9 SCORE 10/3/2018 10/31/2018   PHQ-9 Total Score 12 4       If PHQ-9 recheck is 5 or more, route to provider for next steps.    Patient is due for:  PHQ9      Composite cancer screening  Chart review shows that this patient is due/due soon for the following None  Summary:    Patient is due/failing the following:   PHQ9    Action needed:   Patient needs to do PHQ9.    Type of outreach:    Sent [employer] message.    Questions for provider review:    None                                                                                                                                    Marian Emanuel LPN       Chart routed to Care Team .

## 2019-05-07 ENCOUNTER — OFFICE VISIT (OUTPATIENT)
Dept: FAMILY MEDICINE | Facility: CLINIC | Age: 54
End: 2019-05-07
Payer: COMMERCIAL

## 2019-05-07 VITALS
HEIGHT: 75 IN | WEIGHT: 229 LBS | HEART RATE: 75 BPM | TEMPERATURE: 98 F | OXYGEN SATURATION: 99 % | DIASTOLIC BLOOD PRESSURE: 80 MMHG | SYSTOLIC BLOOD PRESSURE: 122 MMHG | RESPIRATION RATE: 12 BRPM | BODY MASS INDEX: 28.47 KG/M2

## 2019-05-07 DIAGNOSIS — M48.02 CERVICAL STENOSIS OF SPINAL CANAL: Primary | ICD-10-CM

## 2019-05-07 PROCEDURE — 99214 OFFICE O/P EST MOD 30 MIN: CPT | Performed by: NURSE PRACTITIONER

## 2019-05-07 ASSESSMENT — PATIENT HEALTH QUESTIONNAIRE - PHQ9
SUM OF ALL RESPONSES TO PHQ QUESTIONS 1-9: 7
SUM OF ALL RESPONSES TO PHQ QUESTIONS 1-9: 7
10. IF YOU CHECKED OFF ANY PROBLEMS, HOW DIFFICULT HAVE THESE PROBLEMS MADE IT FOR YOU TO DO YOUR WORK, TAKE CARE OF THINGS AT HOME, OR GET ALONG WITH OTHER PEOPLE: SOMEWHAT DIFFICULT

## 2019-05-07 ASSESSMENT — ANXIETY QUESTIONNAIRES
3. WORRYING TOO MUCH ABOUT DIFFERENT THINGS: NOT AT ALL
1. FEELING NERVOUS, ANXIOUS, OR ON EDGE: NOT AT ALL
5. BEING SO RESTLESS THAT IT IS HARD TO SIT STILL: NOT AT ALL
GAD7 TOTAL SCORE: 0
4. TROUBLE RELAXING: NOT AT ALL
2. NOT BEING ABLE TO STOP OR CONTROL WORRYING: NOT AT ALL
7. FEELING AFRAID AS IF SOMETHING AWFUL MIGHT HAPPEN: NOT AT ALL
6. BECOMING EASILY ANNOYED OR IRRITABLE: NOT AT ALL
7. FEELING AFRAID AS IF SOMETHING AWFUL MIGHT HAPPEN: NOT AT ALL

## 2019-05-07 ASSESSMENT — MIFFLIN-ST. JEOR: SCORE: 1955.4

## 2019-05-07 ASSESSMENT — PAIN SCALES - GENERAL: PAINLEVEL: SEVERE PAIN (6)

## 2019-05-07 NOTE — NURSING NOTE
"Chief Complaint   Patient presents with     Neck Pain       Initial /80   Pulse 75   Temp 98  F (36.7  C) (Tympanic)   Resp 12   Ht 1.899 m (6' 2.75\")   Wt 103.9 kg (229 lb)   SpO2 99%   BMI 28.81 kg/m   Estimated body mass index is 28.81 kg/m  as calculated from the following:    Height as of this encounter: 1.899 m (6' 2.75\").    Weight as of this encounter: 103.9 kg (229 lb).    Patient presents to the clinic using No DME    Health Maintenance that is potentially due pending provider review:  Screening labs    Possibly completing today per provider review.    Is there anyone who you would like to be able to receive your results? No  If yes have patient fill out LUCERO      "

## 2019-05-07 NOTE — PROGRESS NOTES
"  SUBJECTIVE:   Karthik Bird is a 54 year old male who presents to clinic today for the following   health issues:      Neck Pain  Requesting referral to Orthopedic surgeon for discussion       Duration: 4 years ago - progressively getting worse    Description:  Location: right side of neck  Radiation: into the right neck, into the right shoulder and down into back    Intensity:  moderate, severe    Accompanying signs and symptoms: electrical impulse going down in back, hard to sleep at night    History (similar episodes/previous evaluation): MRI done - had injection done lasted about 1-2 years, Was on a trial of Gabapentin at bedtime, Ibuprofen    Precipitating or alleviating factors: None    Therapies tried and outcome: ibuprofen  About 6 years ago i had an \"x-ray assisted\" Cortisone injection in my spinal column due to a MRI confirmed cervical spinal stenosis diagnosis.  I was told at the time that the procedure wouldn't last more than a year, it did last much longer but i'm back to the point of not being able to manage the pain.   My present symptoms are almost identical to those of six years ago, stiff neck, severe pain in my shoulder and a pulsing \"electrical\" tingling running down my arm.     Dr. Francisco Javier Oviedo from [facility] at [facility]: C5-6 interlaminar epidural steroid injection under fluoroscopy 11/8/2012 Dx: RIGHT brachial neuritis or radiculitis [facility]  MRI CERVICAL SPINE, 11/8/2012                                                            INDICATION: Chronic neck pain and right upper extremity radiculopathy. No   history of injury or C-spine surgery.  TECHNIQUE: Routine.  COMPARISON: None.    FINDINGS: C2-3: Normal disc height. No facet arthropathy. No neural   impingement.    C3-4: Trace loss of disc height. Left greater than right uncinate spurring.   Trace left greater than right facet arthropathy. Findings cause mild left   neuroforaminal " narrowing. Trace right neuroforaminal narrowing. No central   canal stenosis    C4-5: Normal disc height. Bilateral uncinate spurring and trace lateral   facet arthropathy. Findings cause moderate to severe right and mild left   neuroforaminal narrowing. Slight effacement of the ventral thecal sac   without cord abutment.    C5-6: Mild loss of disc height. Posterior disc osteophyte complex and   bilateral uncinate spurring. Mild bilateral facet arthropathy. Findings   cause moderate right and mild to moderate left neuroforaminal narrowing.    Effacement of the ventral thecal sac without cord abutment.    C6-7: Mild loss of disc height. Posterior disc osteophyte complex and left   greater than right uncinate spurring. Mild bilateral facet arthropathy.   Findings cause at least moderate left and mild to moderate right   neuroforaminal narrowing. No central canal narrowing.    C7-T1: Normal    Normal in signal and contour of the cervical cord. Visualized contents of   the posterior fossa within normal limits. Cystic lesion abutting the root   of the posterior most molar within the right maxilla (series 3 image 5).   Normal MRI signal within the cervical vertebral bodies and posterior   elements without evidence of abnormal stress reaction or fracture. No   abnormal paraspinous soft tissue masses.    CONCLUSION:  1. Small moderate degenerative disc and facet disease the cervical spine.  2. Multilevel neuroforaminal narrowing secondary to uncinate spurring and   facet hypertrophic change. Specifically, there is moderate to severe right   neuroforaminal narrowing at C4-C5, moderate right and mild/moderate left   neural foramina narrowing at C5-C6 and moderate left neuroforaminal   narrowing at C6-C7  3. Simple appearing cyst involving the apex of the right posterior most   maxillary tooth most consistent with odontogenic/radicular cyst.   Other Result Information   Interface, In Card And Rad Results - 11/08/2012  2:09 PM  [facility]  MRI CERVICAL SPINE                                                            INDICATION: Chronic neck pain and right upper extremity radiculopathy. No   history of injury or C-spine surgery.  TECHNIQUE: Routine.  COMPARISON: None.    FINDINGS: C2-3: Normal disc height. No facet arthropathy. No neural   impingement.    C3-4: Trace loss of disc height. Left greater than right uncinate spurring.   Trace left greater than right facet arthropathy. Findings cause mild left   neuroforaminal narrowing. Trace right neuroforaminal narrowing. No central   canal stenosis    C4-5: Normal disc height. Bilateral uncinate spurring and trace lateral   facet arthropathy. Findings cause moderate to severe right and mild left   neuroforaminal narrowing. Slight effacement of the ventral thecal sac   without cord abutment.    C5-6: Mild loss of disc height. Posterior disc osteophyte complex and   bilateral uncinate spurring. Mild bilateral facet arthropathy. Findings   cause moderate right and mild to moderate left neuroforaminal narrowing.    Effacement of the ventral thecal sac without cord abutment.    C6-7: Mild loss of disc height. Posterior disc osteophyte complex and left   greater than right uncinate spurring. Mild bilateral facet arthropathy.   Findings cause at least moderate left and mild to moderate right   neuroforaminal narrowing. No central canal narrowing.    C7-T1: Normal    Normal in signal and contour of the cervical cord. Visualized contents of   the posterior fossa within normal limits. Cystic lesion abutting the root   of the posterior most molar within the right maxilla (series 3 image 5).   Normal MRI signal within the cervical vertebral bodies and posterior   elements without evidence of abnormal stress reaction or fracture. No   abnormal paraspinous soft tissue masses.    CONCLUSION:  1. Small moderate degenerative disc and facet disease the cervical spine.  2. Multilevel  "neuroforaminal narrowing secondary to uncinate spurring and   facet hypertrophic change. Specifically, there is moderate to severe right   neuroforaminal narrowing at C4-C5, moderate right and mild/moderate left   neural foramina narrowing at C5-C6 and moderate left neuroforaminal   narrowing at C6-C7  3. Simple appearing cyst involving the apex of the right posterior most   maxillary tooth most consistent with odontogenic/radicular cyst.       PCP   Tina Ramirez, [phone]    Health Maintenance        Health Maintenance Due   Topic Date Due     HIV SCREEN (SYSTEM ASSIGNED)  05/04/1982     HEPATITIS C SCREENING  05/04/1982     ZOSTER IMMUNIZATION (1 of 2) 05/04/2014     PREVENTIVE CARE VISIT  01/16/2016     ADVANCE DIRECTIVE PLANNING Q5 YRS  05/04/2019     PHQ-9 Q6 MONTHS  04/30/2019       HPI        Patient Active Problem List   Diagnosis     Chronic migraine without aura, not intractable     Colon polyp     CARDIOVASCULAR SCREENING; LDL GOAL LESS THAN 160     Obesity (BMI 30.0-34.9)     ED (erectile dysfunction)     H/O rotator cuff tear     Cervical stenosis of spinal canal     Current Outpatient Medications   Medication     Cholecalciferol (VITAMIN D-3) 5000 units TABS     naltrexone-bupropion (CONTRAVE) 8-90 MG per 12 hr tablet     SUMAtriptan (IMITREX) 25 MG tablet     magnesium 250 MG tablet     No current facility-administered medications for this visit.        Reviewed and updated:  Tobacco  Allergies  Meds  Med Hx  Surg Hx  Fam Hx  Soc Hx     ROS:  Constitutional, HEENT, cardiovascular, pulmonary, gi and gu systems are negative, except as otherwise noted.    PHYSICAL EXAM   /80   Pulse 75   Temp 98  F (36.7  C) (Tympanic)   Resp 12   Ht 1.899 m (6' 2.75\")   Wt 103.9 kg (229 lb)   SpO2 99%   BMI 28.81 kg/m    Body mass index is 28.81 kg/m .  GENERAL APPEARANCE: healthy, alert and no distress  RESP: lungs clear to auscultation - no rales, rhonchi or wheezes  CV: regular " rates and rhythm, normal S1 S2, no S3 or S4 and no murmur, click or rub  MS: extremities normal- no gross deformities noted  PSYCH: mentation appears normal and affect normal/bright    ASSESSMENT & PLAN     1. Cervical stenosis of spinal canal  Chronic, stable  - ORTHO  REFERRAL  - MR Cervical Spine w/o Contrast; Future: Schedule at [facility]  at [phone]  Ibuprofen 600 mg every 6 hours as needed      Risks, benefits, side effects and rationale for treatment plan fully discussed with the patient and understanding expressed.    Tina Ramirez, [facility]

## 2019-05-07 NOTE — PATIENT INSTRUCTIONS
1. Cervical stenosis of spinal canal  Chronic, stable  - ORTHO  REFERRAL  - MR Cervical Spine w/o Contrast; Future: Schedule at [facility]  at [phone]  Ibuprofen 600 mg every 6 hours as needed

## 2019-05-08 ENCOUNTER — HOSPITAL ENCOUNTER (OUTPATIENT)
Dept: MRI IMAGING | Facility: CLINIC | Age: 54
Discharge: HOME | End: 2019-05-08
Attending: NURSE PRACTITIONER | Admitting: NURSE PRACTITIONER
Payer: COMMERCIAL

## 2019-05-08 DIAGNOSIS — M48.02 CERVICAL STENOSIS OF SPINAL CANAL: ICD-10-CM

## 2019-05-08 PROCEDURE — 72141 MRI NECK SPINE W/O DYE: CPT

## 2019-05-08 ASSESSMENT — ANXIETY QUESTIONNAIRES: GAD7 TOTAL SCORE: 0

## 2019-05-08 NOTE — RESULT ENCOUNTER NOTE
Dear Karthik,  Degenerative changes seen throughout the cervical spine with some areas of foraminal stenosis.   Follow with Orthopedist on 5/13/2019 and review further.  Please contact our clinic via phone or My Chart if you have any questions or concerns.  Thanks,  JOY Greene

## 2019-05-13 ENCOUNTER — OFFICE VISIT (OUTPATIENT)
Dept: ORTHOPEDICS | Facility: CLINIC | Age: 54
End: 2019-05-13
Attending: NURSE PRACTITIONER
Payer: COMMERCIAL

## 2019-05-13 VITALS — WEIGHT: 229 LBS | BODY MASS INDEX: 28.47 KG/M2 | HEIGHT: 75 IN

## 2019-05-13 DIAGNOSIS — M54.12 CERVICAL RADICULOPATHY: Primary | ICD-10-CM

## 2019-05-13 PROCEDURE — 99214 OFFICE O/P EST MOD 30 MIN: CPT | Performed by: FAMILY MEDICINE

## 2019-05-13 ASSESSMENT — PAIN SCALES - GENERAL: PAINLEVEL: MODERATE PAIN (5)

## 2019-05-13 ASSESSMENT — MIFFLIN-ST. JEOR: SCORE: 1955.4

## 2019-05-13 NOTE — PATIENT INSTRUCTIONS
Thanks for coming today.  Ortho/Sports Medicine Clinic  [address] [place] [address]    To schedule future appointments in Ortho Clinic, you may call [phone].    To schedule ordered imaging by your Provider: Call [facility] at [phone]    Training Intelligence available online at:   [email or web address]    Please call if any further questions or concerns [phone] and ask for the Orthopedic Department. Clinic hours 8 am to 5 pm.    Return to clinic if symptoms worsen.

## 2019-05-13 NOTE — LETTER
"    5/13/2019         RE: Karthik Bird  [address] [facility] [address]        Dear Colleague,    Thank you for referring your patient, Karthik Bird, to the [facility]. Please see a copy of my visit note below.          [facility]  5/13/2019    Karthik Bird's chief complaint for this visit includes:  Chief Complaint   Patient presents with     Consult     Neck pain, right arm pain and pain down the right side of his back . has tried a MOISES 7 years ago with relief.      PCP: Tina Ramirez    Referring Provider:  Tina Ramirez, CNP  [address] [place] [address]    Ht 1.899 m (6' 2.75\")   Wt 103.9 kg (229 lb)   BMI 28.81 kg/m     Moderate Pain (5)       Reason for visit:     What part of your body is injured / painful?  right neck and shoulder     What caused the injury /pain? No inciting event     How long ago did your injury occur or pain begin? problem is longstanding    What are your most bothersome symptoms? Pain    How would you characterize your symptom?  aching    What makes your symptoms better? Rest    What makes your symptoms worse? Standing, Walking, Sitting and Movement    Have you been previously seen for this problem? Yes, pain clinic    Medical History:    Any recent changes to your medical history? No    Any new medication prescribed since last visit? No    Have you had surgery on this body part before? No    Social History:    Occupation: Director     Handedness: Left    Review of Systems:    Do you have fever, chills, weight loss? No    Do you have any vision problems? No    Do you have any chest pain or edema? No    Do you have any shortness of breath or wheezing?  No    Do you have stomach problems? No    Do you have any numbness or focal weakness? No    Do you have diabetes? No    Do you have problems with bleeding or clotting? No    Do you have an rashes or other skin lesions? No       CHIEF COMPLAINT:  Consult (Neck " pain, right arm pain and pain down the right side of his back . has tried a MOISES 7 years ago with relief. )       HISTORY OF PRESENT ILLNESS  Mr. Bird is a pleasant 54 year old year old male who presents to clinic today with neck pain.  He is seen at the request of Marcia Ramirez.    Karthik has been experiencing right-sided neck and shoulder pain for many years.  After having multiple work-ups done on his right shoulder and he was eventually diagnosed with cervical radiculopathy about 7 or 8 years ago.  At the time he had a right sided transforaminal injection and did great for the next couple of years.  Since then his pain has slowly returned.  He reports right-sided neck and arm pain that radiates down his shoulder.  At times this radiates down the right side of his back in the region of his latissimus.  This pain is quite severe, it limits him daily.  At this time he is not experiencing any symptoms down below his elbow.  He did have a recent MRI that he would like to review.  This is his dominant arm.      Additional history: as documented    MEDICAL HISTORY  Patient Active Problem List   Diagnosis     Chronic migraine without aura, not intractable     Colon polyp     CARDIOVASCULAR SCREENING; LDL GOAL LESS THAN 160     Obesity (BMI 30.0-34.9)     ED (erectile dysfunction)     H/O rotator cuff tear     Cervical stenosis of spinal canal       Current Outpatient Medications   Medication Sig Dispense Refill     Cholecalciferol (VITAMIN D-3) 5000 units TABS        magnesium 250 MG tablet Take 1 tablet by mouth daily       naltrexone-bupropion (CONTRAVE) 8-90 MG per 12 hr tablet Take 2 tabs bymouth every a.m. and 1 tab by mouth every p.m. 90 tablet 0     SUMAtriptan (IMITREX) 25 MG tablet Take 1-2 tablets (25-50 mg) by mouth at onset of headache May repeat in 1-2 hours after onset. 30 tablet 1       Allergies   Allergen Reactions     Nka [No Known Allergies]        Family History   Problem Relation Age of Onset      "Cancer Mother         lung- uterine     Liver Disease Father      Alzheimer Disease Maternal Grandmother         99     Pneumonia Maternal Grandfather         99       Additional medical/Social/Surgical histories reviewed in [facility] and updated as appropriate.          PHYSICAL EXAM    Vitals:    05/13/19 0852   Weight: 103.9 kg (229 lb)   Height: 1.899 m (6' 2.75\")     General  - normal appearance, in no obvious distress  CV  - normal peripheral perfusion  Pulm  - normal respiratory pattern, non-labored  Musculoskeletal - cervical spine  - inspection: normal bone and joint alignment, no obvious kyphosis  - palpation: no paravertebral or bony tenderness  - ROM: pain with extension, bilateral sidebending  - strength: upper extremities 5/5 in all planes  Neuro  - C5-7 DTRs 2+ bilaterally, no sensory or motor deficit, grossly normal coordination, normal muscle tone  Skin  - no ecchymosis, erythema, warmth, or induration, no obvious rash  Psych  - interactive, appropriate, normal mood and affect             ASSESSMENT & PLAN  Mr. Bird is a 54 year old year old male who presents to clinic today with chronic neck pain with radicular features.    I reviewed his MR in the room with him which shows multilevel degenerative changes, most severe at C6-7 where there is moderate left and moderate to severe right-sided neural foraminal stenosis with mild spinal canal stenosis.      Karthik and I had a good discussion regarding non-operative treatment for degenerative disc disease.  This included strengthening of the paraspinal and core muscles, weight control, and oral analgesics when needed.  We also discussed the role of epidural corticosteroid injections.    I'm referring him for an injection, he can get this done at his earliest convenience.  I'm also referring him for physical therapy.    Karthik is interested in discussing his future options with a surgeon, he has specific questions centering around long-term spine care and if " surgery will benefit him.  I'm referring him to our partners to have this discussion.    Thank you for allowing me to participate in Karthik's care.    Kaleb Saenz DO, [facility]  Primary Care Sports Medicine           Again, thank you for allowing me to participate in the care of your patient.        Sincerely,        Kaleb Saenz DO

## 2019-05-13 NOTE — TELEPHONE ENCOUNTER
RECORDS RECEIVED FROM: Cervical radiculopathy - Referral & Records & Imaging in [facility] - no previous surgeries, no outside Records - Per Nely at [facility]   DATE RECEIVED: May 17, 2019    NOTES STATUS DETAILS   OFFICE NOTE from referring provider Internal Dr. Saenz 05/13/19    OFFICE NOTE from other specialist N/A    DISCHARGE SUMMARY from hospital N/A    DISCHARGE REPORT from the ER N/A    OPERATIVE REPORT N/A    MEDICATION LIST Internal    IMPLANT RECORD/STICKER N/A    LABS     CBC/DIFF N/A    CULTURES N/A    INJECTIONS DONE IN RADIOLOGY No images or report available, per [facility] 11/8/12   MRI Internal/ received 5/8/19, 11/8/12     CT SCAN N/A    XRAYS (IMAGES & REPORTS) N/A    TUMOR     PATHOLOGY  Slides & report N/A       specialty did MRI and injection on 11/8/12 05/13/19   10:47 AM  Faxed request to [facility] for images    05/14/19   12:26 PM  Called  Specialty for injection

## 2019-05-13 NOTE — PROGRESS NOTES
"      [facility]  5/13/2019    Karthik Bird's chief complaint for this visit includes:  Chief Complaint   Patient presents with     Consult     Neck pain, right arm pain and pain down the right side of his back . has tried a MOISES 7 years ago with relief.      PCP: Tina Ramirez    Referring Provider:  Tina Ramirez, CNP  [address] [place] [address]    Ht 1.899 m (6' 2.75\")   Wt 103.9 kg (229 lb)   BMI 28.81 kg/m    Moderate Pain (5)       Reason for visit:     What part of your body is injured / painful?  right neck and shoulder     What caused the injury /pain? No inciting event     How long ago did your injury occur or pain begin? problem is longstanding    What are your most bothersome symptoms? Pain    How would you characterize your symptom?  aching    What makes your symptoms better? Rest    What makes your symptoms worse? Standing, Walking, Sitting and Movement    Have you been previously seen for this problem? Yes, pain clinic    Medical History:    Any recent changes to your medical history? No    Any new medication prescribed since last visit? No    Have you had surgery on this body part before? No    Social History:    Occupation: Director     Handedness: Left    Review of Systems:    Do you have fever, chills, weight loss? No    Do you have any vision problems? No    Do you have any chest pain or edema? No    Do you have any shortness of breath or wheezing?  No    Do you have stomach problems? No    Do you have any numbness or focal weakness? No    Do you have diabetes? No    Do you have problems with bleeding or clotting? No    Do you have an rashes or other skin lesions? No       CHIEF COMPLAINT:  Consult (Neck pain, right arm pain and pain down the right side of his back . has tried a MOISES 7 years ago with relief. )       HISTORY OF PRESENT ILLNESS  Mr. Bird is a pleasant 54 year old year old male who presents to clinic today with neck pain.  He is seen " at the request of Marcia RamirezRuben Angeles has been experiencing right-sided neck and shoulder pain for many years.  After having multiple work-ups done on his right shoulder and he was eventually diagnosed with cervical radiculopathy about 7 or 8 years ago.  At the time he had a right sided transforaminal injection and did great for the next couple of years.  Since then his pain has slowly returned.  He reports right-sided neck and arm pain that radiates down his shoulder.  At times this radiates down the right side of his back in the region of his latissimus.  This pain is quite severe, it limits him daily.  At this time he is not experiencing any symptoms down below his elbow.  He did have a recent MRI that he would like to review.  This is his dominant arm.      Additional history: as documented    MEDICAL HISTORY  Patient Active Problem List   Diagnosis     Chronic migraine without aura, not intractable     Colon polyp     CARDIOVASCULAR SCREENING; LDL GOAL LESS THAN 160     Obesity (BMI 30.0-34.9)     ED (erectile dysfunction)     H/O rotator cuff tear     Cervical stenosis of spinal canal       Current Outpatient Medications   Medication Sig Dispense Refill     Cholecalciferol (VITAMIN D-3) 5000 units TABS        magnesium 250 MG tablet Take 1 tablet by mouth daily       naltrexone-bupropion (CONTRAVE) 8-90 MG per 12 hr tablet Take 2 tabs bymouth every a.m. and 1 tab by mouth every p.m. 90 tablet 0     SUMAtriptan (IMITREX) 25 MG tablet Take 1-2 tablets (25-50 mg) by mouth at onset of headache May repeat in 1-2 hours after onset. 30 tablet 1       Allergies   Allergen Reactions     Nka [No Known Allergies]        Family History   Problem Relation Age of Onset     Cancer Mother         lung- uterine     Liver Disease Father      Alzheimer Disease Maternal Grandmother         99     Pneumonia Maternal Grandfather         99       Additional medical/Social/Surgical histories reviewed in [facility] and updated as  "appropriate.          PHYSICAL EXAM    Vitals:    05/13/19 0852   Weight: 103.9 kg (229 lb)   Height: 1.899 m (6' 2.75\")     General  - normal appearance, in no obvious distress  CV  - normal peripheral perfusion  Pulm  - normal respiratory pattern, non-labored  Musculoskeletal - cervical spine  - inspection: normal bone and joint alignment, no obvious kyphosis  - palpation: no paravertebral or bony tenderness  - ROM: pain with extension, bilateral sidebending  - strength: upper extremities 5/5 in all planes  Neuro  - C5-7 DTRs 2+ bilaterally, no sensory or motor deficit, grossly normal coordination, normal muscle tone  Skin  - no ecchymosis, erythema, warmth, or induration, no obvious rash  Psych  - interactive, appropriate, normal mood and affect             ASSESSMENT & PLAN  Mr. Bird is a 54 year old year old male who presents to clinic today with chronic neck pain with radicular features.    I reviewed his MR in the room with him which shows multilevel degenerative changes, most severe at C6-7 where there is moderate left and moderate to severe right-sided neural foraminal stenosis with mild spinal canal stenosis.      Karthik and I had a good discussion regarding non-operative treatment for degenerative disc disease.  This included strengthening of the paraspinal and core muscles, weight control, and oral analgesics when needed.  We also discussed the role of epidural corticosteroid injections.    I'm referring him for an injection, he can get this done at his earliest convenience.  I'm also referring him for physical therapy.    Karthik is interested in discussing his future options with a surgeon, he has specific questions centering around long-term spine care and if surgery will benefit him.  I'm referring him to our partners to have this discussion.    Thank you for allowing me to participate in Karthik's care.    Kaleb Saenz DO, [facility]  Primary Care Sports Medicine         "

## 2019-05-14 ASSESSMENT — ENCOUNTER SYMPTOMS
MUSCLE CRAMPS: 1
ARTHRALGIAS: 1
MUSCLE WEAKNESS: 1
BACK PAIN: 1
NECK PAIN: 1
JOINT SWELLING: 0
MYALGIAS: 1
STIFFNESS: 1

## 2019-05-16 DIAGNOSIS — M54.2 CERVICAL PAIN: Primary | ICD-10-CM

## 2019-05-17 ENCOUNTER — OFFICE VISIT (OUTPATIENT)
Dept: ORTHOPEDICS | Facility: CLINIC | Age: 54
End: 2019-05-17
Attending: FAMILY MEDICINE
Payer: COMMERCIAL

## 2019-05-17 ENCOUNTER — PRE VISIT (OUTPATIENT)
Dept: ORTHOPEDICS | Facility: CLINIC | Age: 54
End: 2019-05-17

## 2019-05-17 ENCOUNTER — ANCILLARY PROCEDURE (OUTPATIENT)
Dept: GENERAL RADIOLOGY | Facility: CLINIC | Age: 54
End: 2019-05-17
Attending: ORTHOPAEDIC SURGERY
Payer: COMMERCIAL

## 2019-05-17 VITALS — HEIGHT: 75 IN | BODY MASS INDEX: 28.47 KG/M2 | WEIGHT: 229 LBS

## 2019-05-17 DIAGNOSIS — M54.12 CERVICAL RADICULOPATHY: Primary | ICD-10-CM

## 2019-05-17 RX ORDER — GABAPENTIN 300 MG/1
300 CAPSULE ORAL 3 TIMES DAILY
Qty: 30 CAPSULE | Refills: 0 | Status: SHIPPED | OUTPATIENT
Start: 2019-05-17 | End: 2021-03-17

## 2019-05-17 ASSESSMENT — MIFFLIN-ST. JEOR: SCORE: 1955.4

## 2019-05-17 NOTE — PROGRESS NOTES
"Spine Surgery Consultation    REFERRING PHYSICIAN: Kaleb Saenz   PRIMARY CARE PHYSICIAN: Tina Ramirez           Chief Complaint:   Consult (cervical radiculopathy )      History of Present Illness:  Symptom Profile Including: location of symptoms, onset, severity, exacerbating/alleviating factors, previous treatments:        Karthik Bird is a 54 year old male who presents today for evaluation of right radicular shoulder pain and altered sensation.  Patient reports that this all started around 10 years ago and was worked up for shoulder pain issues before it was determined that symptoms were coming from his cervical spine.  6 years ago he had a cervical spine epidural injection at the pain clinic at [facility] and this provided him instant relief of his right upper extremity symptoms.  This relief lasted for about 6 months, but he has not had any injections since then.  He reports pain has been well controlled until about 2 years ago, and has been progressively worsening since then.  Today he rates it as a 6/10 and describes neck pain is tight and dull, and the shoulder pain is acute and sharp.  Neck discomfort is constant, that shoulder pain comes and goes.  Pain is localized to midline neck radiation into the upper trapezius and back of upper arm in a C7 dermatomal pattern.   Describes \"electrical impulse\" sensation into right upper arm in same pattern. Denies any weakness, numbness in R arm. Denies left upper extremity radicular symptoms. Patient says that pain is improved with movement, and worsened with sleep.  Pain is worst when he wakes up.      Past treatments tried:  - Physical therapy: None.     He is going to start going to physical therapy soon.  Order already in place.  - Injections: [facility] pain clinic injection 6 years ago.  None since then.   He is scheduled for an injection on June 11.  - Medications: Ibuprofen as needed    Referred by Dr. Kaleb Saenz, Sports " "Medicine    Social:  -Sits a lot at work,  patient works as a   -Denies tobacco usage, alcohol use social, no illicit drug use.         Past Medical History:     Past Medical History:   Diagnosis Date     Migraine      Seasonal allergies             Past Surgical History:     Past Surgical History:   Procedure Laterality Date     APPENDECTOMY      at 14 yo     COLONOSCOPY       GENITOURINARY SURGERY      previous vasectomy 2/2014     VASECTOMY  5/30/2014    Procedure: VASECTOMY;  Surgeon: Zaida Slaughter MD;  Location: [facility] OR            Social History:     Social History     Tobacco Use     Smoking status: Never Smoker     Smokeless tobacco: Never Used   Substance Use Topics     Alcohol use: Yes     Comment: twice a week            Family History:     Family History   Problem Relation Age of Onset     Cancer Mother         lung- uterine     Liver Disease Father      Alzheimer Disease Maternal Grandmother         99     Pneumonia Maternal Grandfather         99            Allergies:     Allergies   Allergen Reactions     Nka [No Known Allergies]             Medications:     Current Outpatient Medications   Medication     Cholecalciferol (VITAMIN D-3) 5000 units TABS     magnesium 250 MG tablet     naltrexone-bupropion (CONTRAVE) 8-90 MG per 12 hr tablet     SUMAtriptan (IMITREX) 25 MG tablet     No current facility-administered medications for this visit.              Review of Systems:     A 10 point ROS was performed and reviewed. Specific responses to these questions are noted at the end of the document.         Physical Exam:     PHYSICAL EXAM:   Constitutional - Patient is healthy, well-nourished and appears stated age.    Vitals: Ht 1.899 m (6' 2.75\")   Wt 103.9 kg (229 lb)   BMI 28.81 kg/m     Respiratory - Patient is breathing normally and in no respiratory distress.   Skin - No suspicious rashes or lesions.   Psychiatric - Normal mood and affect.   Cardiovascular - Extremities " warm and well perfused.   Eyes - Visual acuity is normal to the written word.   ENT - Hearing intact to the spoken word.   GI - No abdominal distention.   Musculoskeletal - Non-antalgic gait without use of assistive devices.        Cervical spine:    Appearance - Normal     Palpation - No midline tenderness to cervical spine. Mild tenderness to palpation of right upper trapezius.    ROM - decreased, painful lateral flexion on the right, painful full forward flexion. Otherwise, painless full ROM of cervical spine.    Motor -     UPPER EXTREMITY Left Right    strength 5/5 5/5   Finger ab/adduction 5/5 5/5   Wrist flexion 5/5 5/5   Wrist extension 5/5 5/5   Elbow flexion 5/5 5/5   Elbow extension 5/5 5/5   Shoulder flexion 5/5 5/5         Special Tests -      C-spine instability - Negative     Cervical axial load - Negative     Lhermitte's Test - Negative     Spurling's Test - Positive on right       Neurologic - Sensation intact to light touch bilaterally.      REFLEXES Left Right   Biceps 1+ 1+   Triceps 2+ 2+   Brachioradialis 2+ 2+   Aniceto's negative negative               Shoulder:  Full, painless ROM. Non-tender to palpation, no point tenderness, no obvious deformity.    Alignment:  Patient stands with a neutral standing sagittal balance.         Imaging:   We ordered and independently reviewed new radiographs at this clinic visit. The results were discussed with the patient.  Findings include:    May 17, 2019 AP lateral flexion extension cervical radiographs show mild diffuse cervical spondylosis with preserved overall alignment no dynamic instability or fractures.     May 8, 2019 cervical MRI shows bilateral foraminal stenosis somewhat worse right on the left and a moderate to severe category between C4 and C6             Assessment and Plan:   Assessment:  54 year old male with presents today for evaluation of right radicular shoulder pain and altered sensation in right upper extremity.  Imaging shows  findings as listed above, this is not dramatically different from previous MRI he has had.  Because he was significantly improved with nonoperative management before, we recommend that he trial this before pursuing surgical intervention.  Specifically, recommended that he try taking gabapentin at nighttime for radicular symptoms.  Also recommended that he try another epidural injection in his cervical spine, which he is already scheduled for this next socorro.  Physical therapy is also recommended for patient and he has an appointment to do this.  If he were to have surgery we might consider an ACDF, and we gave him [email or web address] information to educate himself about this procedure in the meantime. Follow up when he determines he would like to pursue surgical management.     Plan:  1. Cervical radiculopathy, right  - gabapentin prescribed  - already scheduled for cervical epidural injection on 6/11/19 and physical therapy.   - follow up PRN, when he would like to pursue surgical intervention    Attending MD (Dr. Chuy Antunez) :  I reviewed and verified the history and physical exam of the patient and discussed the patient's management with the other clinical providers involved in this patient's care including any involved residents or physicians assistants. I reviewed the above note and agree with the documented findings and plan of care, which were communicated to the patient.      MD Annmarie Jung PA-C    Respectfully,  Chuy Antunez MD  Spine Surgery  [facility]        Answers for HPI/ROS submitted by the patient on 5/14/2019   General Symptoms: No  Skin Symptoms: No  HENT Symptoms: No  EYE SYMPTOMS: No  HEART SYMPTOMS: No  LUNG SYMPTOMS: No  INTESTINAL SYMPTOMS: No  URINARY SYMPTOMS: No  REPRODUCTIVE SYMPTOMS: No  SKELETAL SYMPTOMS: Yes  BLOOD SYMPTOMS: No  NERVOUS SYSTEM SYMPTOMS: No  MENTAL HEALTH SYMPTOMS: No  Back pain: Yes  Muscle aches: Yes  Neck  pain: Yes  Swollen joints: No  Joint pain: Yes  Bone pain: No  Muscle cramps: Yes  Muscle weakness: Yes  Joint stiffness: Yes  Bone fracture: No

## 2019-05-17 NOTE — NURSING NOTE
"Reason For Visit:   Chief Complaint   Patient presents with     Consult     cervical radiculopathy        Primary MD: Tina Ramirez  Ref. MD: Dany     ?  No  Occupation  .  Currently working? No.  Work status?  Full time.  Date of injury: about 6 years ago   Type of injury: chronic .  Date of surgery: none   Type of surgery: none .  Smoker: No  Request smoking cessation information: No    Ht 1.899 m (6' 2.75\")   Wt 103.9 kg (229 lb)   BMI 28.81 kg/m      Pain Assessment  Patient Currently in Pain: Yes  0-10 Pain Scale: 6(electrical impulse going down shoulde rblade)  Primary Pain Location: Neck    Oswestry (JAKE) Questionnaire    OSWESTRY DISABILITY INDEX 5/14/2019   Count 10   Sum 14   Oswestry Score (%) 28            Neck Disability Index (NDI) Questionnaire    No flowsheet data found.                Promis 10 Assessment    PROMIS 10 5/14/2019   In general, would you say your health is: Good   In general, would you say your quality of life is: Good   In general, how would you rate your physical health? Good   In general, how would you rate your mental health, including your mood and your ability to think? Good   In general, how would you rate your satisfaction with your social activities and relationships? Fair   In general, please rate how well you carry out your usual social activities and roles Good   To what extent are you able to carry out your everyday physical activities such as walking, climbing stairs, carrying groceries, or moving a chair? Moderately   How often have you been bothered by emotional problems such as feeling anxious, depressed or irritable? Sometimes   How would you rate your fatigue on average? Moderate   How would you rate your pain on average?   0 = No Pain  to  10 = Worst Imaginable Pain 6   In general, would you say your health is: 3   In general, would you say your quality of life is: 3   In general, how would you rate your physical health? 3 "   In general, how would you rate your mental health, including your mood and your ability to think? 3   In general, how would you rate your satisfaction with your social activities and relationships? 2   In general, please rate how well you carry out your usual social activities and roles. (This includes activities at home, at work and in your community, and responsibilities as a parent, child, spouse, employee, friend, etc.) 3   To what extent are you able to carry out your everyday physical activities such as walking, climbing stairs, carrying groceries, or moving a chair? 3   In the past 7 days, how often have you been bothered by emotional problems such as feeling anxious, depressed, or irritable? 3   In the past 7 days, how would you rate your fatigue on average? 3   In the past 7 days, how would you rate your pain on average, where 0 means no pain, and 10 means worst imaginable pain? 6   Global Mental Health Score 11   Global Physical Health Score 12   PROMIS TOTAL - SUBSCORES 23                Thee Beckham, ROBBIN

## 2019-05-17 NOTE — LETTER
"5/17/2019       RE: Karthik Bird  [address]   [facility] [address]     Dear Colleague,    Thank you for referring your patient, Karthik Bird, to the [facility]. Please see a copy of my visit note below.    Spine Surgery Consultation    REFERRING PHYSICIAN: Kaleb Saenz   PRIMARY CARE PHYSICIAN: Tina Ramirez           Chief Complaint:   Consult (cervical radiculopathy )      History of Present Illness:  Symptom Profile Including: location of symptoms, onset, severity, exacerbating/alleviating factors, previous treatments:        Karthik Bird is a 54 year old male who presents today for evaluation of right radicular shoulder pain and altered sensation.  Patient reports that this all started around 10 years ago and was worked up for shoulder pain issues before it was determined that symptoms were coming from his cervical spine.  6 years ago he had a cervical spine epidural injection at the pain clinic at [facility] and this provided him instant relief of his right upper extremity symptoms.  This relief lasted for about 6 months, but he has not had any injections since then.  He reports pain has been well controlled until about 2 years ago, and has been progressively worsening since then.  Today he rates it as a 6/10 and describes neck pain is tight and dull, and the shoulder pain is acute and sharp.  Neck discomfort is constant, that shoulder pain comes and goes.  Pain is localized to midline neck radiation into the upper trapezius and back of upper arm in a C7 dermatomal pattern.   Describes \"electrical impulse\" sensation into right upper arm in same pattern. Denies any weakness, numbness in R arm. Denies left upper extremity radicular symptoms. Patient says that pain is improved with movement, and worsened with sleep.  Pain is worst when he wakes up.      Past treatments tried:  - Physical therapy: None.     He is going to " start going to physical therapy soon.  Order already in place.  - Injections: [facility] pain clinic injection 6 years ago.  None since then.   He is scheduled for an injection on June 11.  - Medications: Ibuprofen as needed    Referred by Dr. Kaleb Saenz, Sports Medicine    Social:  -Sits a lot at work,  patient works as a   -Denies tobacco usage, alcohol use social, no illicit drug use.         Past Medical History:     Past Medical History:   Diagnosis Date     Migraine      Seasonal allergies             Past Surgical History:     Past Surgical History:   Procedure Laterality Date     APPENDECTOMY      at 16 yo     COLONOSCOPY       GENITOURINARY SURGERY      previous vasectomy 2/2014     VASECTOMY  5/30/2014    Procedure: VASECTOMY;  Surgeon: Zaida Slaughter MD;  Location: [facility] OR            Social History:     Social History     Tobacco Use     Smoking status: Never Smoker     Smokeless tobacco: Never Used   Substance Use Topics     Alcohol use: Yes     Comment: twice a week            Family History:     Family History   Problem Relation Age of Onset     Cancer Mother         lung- uterine     Liver Disease Father      Alzheimer Disease Maternal Grandmother         99     Pneumonia Maternal Grandfather         99            Allergies:     Allergies   Allergen Reactions     Nka [No Known Allergies]             Medications:     Current Outpatient Medications   Medication     Cholecalciferol (VITAMIN D-3) 5000 units TABS     magnesium 250 MG tablet     naltrexone-bupropion (CONTRAVE) 8-90 MG per 12 hr tablet     SUMAtriptan (IMITREX) 25 MG tablet     No current facility-administered medications for this visit.              Review of Systems:     A 10 point ROS was performed and reviewed. Specific responses to these questions are noted at the end of the document.         Physical Exam:     PHYSICAL EXAM:   Constitutional - Patient is healthy, well-nourished and appears stated  "age.    Vitals: Ht 1.899 m (6' 2.75\")   Wt 103.9 kg (229 lb)   BMI 28.81 kg/m      Respiratory - Patient is breathing normally and in no respiratory distress.   Skin - No suspicious rashes or lesions.   Psychiatric - Normal mood and affect.   Cardiovascular - Extremities warm and well perfused.   Eyes - Visual acuity is normal to the written word.   ENT - Hearing intact to the spoken word.   GI - No abdominal distention.   Musculoskeletal - Non-antalgic gait without use of assistive devices.        Cervical spine:    Appearance - Normal     Palpation - No midline tenderness to cervical spine. Mild tenderness to palpation of right upper trapezius.    ROM - decreased, painful lateral flexion on the right, painful full forward flexion. Otherwise, painless full ROM of cervical spine.    Motor -     UPPER EXTREMITY Left Right    strength 5/5 5/5   Finger ab/adduction 5/5 5/5   Wrist flexion 5/5 5/5   Wrist extension 5/5 5/5   Elbow flexion 5/5 5/5   Elbow extension 5/5 5/5   Shoulder flexion 5/5 5/5         Special Tests -      C-spine instability - Negative     Cervical axial load - Negative     Lhermitte's Test - Negative     Spurling's Test - Positive on right       Neurologic - Sensation intact to light touch bilaterally.      REFLEXES Left Right   Biceps 1+ 1+   Triceps 2+ 2+   Brachioradialis 2+ 2+   Aniceto's negative negative               Shoulder:  Full, painless ROM. Non-tender to palpation, no point tenderness, no obvious deformity.    Alignment:  Patient stands with a neutral standing sagittal balance.         Imaging:   We ordered and independently reviewed new radiographs at this clinic visit. The results were discussed with the patient.  Findings include:    May 17, 2019 AP lateral flexion extension cervical radiographs show mild diffuse cervical spondylosis with preserved overall alignment no dynamic instability or fractures.     May 8, 2019 cervical MRI shows bilateral foraminal stenosis somewhat " worse right on the left and a moderate to severe category between C4 and C6             Assessment and Plan:   Assessment:  54 year old male with presents today for evaluation of right radicular shoulder pain and altered sensation in right upper extremity.  Imaging shows findings as listed above, this is not dramatically different from previous MRI he has had.  Because he was significantly improved with nonoperative management before, we recommend that he trial this before pursuing surgical intervention.  Specifically, recommended that he try taking gabapentin at nighttime for radicular symptoms.  Also recommended that he try another epidural injection in his cervical spine, which he is already scheduled for this next socorro.  Physical therapy is also recommended for patient and he has an appointment to do this.  If he were to have surgery we might consider an ACDF, and we gave him [email or web address] information to educate himself about this procedure in the meantime. Follow up when he determines he would like to pursue surgical management.     Plan:  1. Cervical radiculopathy, right  - gabapentin prescribed  - already scheduled for cervical epidural injection on 6/11/19 and physical therapy.   - follow up PRN, when he would like to pursue surgical intervention    Attending MD (Dr. Chuy Antunez) :  I reviewed and verified the history and physical exam of the patient and discussed the patient's management with the other clinical providers involved in this patient's care including any involved residents or physicians assistants. I reviewed the above note and agree with the documented findings and plan of care, which were communicated to the patient.      MD Annmarie Jung PA-C

## 2019-06-11 ENCOUNTER — ANCILLARY PROCEDURE (OUTPATIENT)
Dept: GENERAL RADIOLOGY | Facility: CLINIC | Age: 54
End: 2019-06-11
Attending: FAMILY MEDICINE
Payer: COMMERCIAL

## 2019-06-11 VITALS — SYSTOLIC BLOOD PRESSURE: 114 MMHG | OXYGEN SATURATION: 95 % | DIASTOLIC BLOOD PRESSURE: 71 MMHG | HEART RATE: 75 BPM

## 2019-06-11 DIAGNOSIS — M54.12 CERVICAL RADICULOPATHY: ICD-10-CM

## 2019-06-11 PROCEDURE — 62321 NJX INTERLAMINAR CRV/THRC: CPT | Performed by: RADIOLOGY

## 2019-06-11 RX ORDER — IOPAMIDOL 408 MG/ML
10 INJECTION, SOLUTION INTRATHECAL ONCE
Status: COMPLETED | OUTPATIENT
Start: 2019-06-11 | End: 2019-06-11

## 2019-06-11 RX ORDER — BETAMETHASONE SODIUM PHOSPHATE AND BETAMETHASONE ACETATE 3; 3 MG/ML; MG/ML
6 INJECTION, SUSPENSION INTRA-ARTICULAR; INTRALESIONAL; INTRAMUSCULAR; SOFT TISSUE ONCE
Status: COMPLETED | OUTPATIENT
Start: 2019-06-11 | End: 2019-06-11

## 2019-06-11 RX ADMIN — IOPAMIDOL 2 ML: 408 INJECTION, SOLUTION INTRATHECAL at 16:17

## 2019-06-11 RX ADMIN — BETAMETHASONE SODIUM PHOSPHATE AND BETAMETHASONE ACETATE 18 MG: 3; 3 INJECTION, SUSPENSION INTRA-ARTICULAR; INTRALESIONAL; INTRAMUSCULAR; SOFT TISSUE at 16:17

## 2019-06-11 NOTE — PROGRESS NOTES
: Karthik was seen in X-ray today for a cervical epidural injection. Patient rated pain before procedure 7/10. After procedure patient rated pain 7/10. This pain level is acceptable to patient. Patient discharged home with .

## 2019-06-11 NOTE — PROGRESS NOTES
Radiology Discharge Instructions Post Lumbar Puncture  AFTER YOU GO HOME  ? DO relax and take it easy for 24 hours; we suggest bed rest until the next morning, except for getting up to the bathroom  ? You may resume normal activity tomorrow  ? You may remove the bandage on your back in the evening or next morning  ? You may resume bathing the next day  ? Drink at least 4 glasses of extra fluid today if not on a fluid restriction.  ? DO NOT drive or operate machinery at home or at work for at least 24 hours.    CALL YOUR PRIMARY PHYSICIAN IF:  ? If you do start to leak a large amount of fluid from the puncture site, lie down flat on your back. Call your primary physician they will tell you if you need to return to the hospital.  ? You develop severe headache  ? You develop nausea or vomiting.  ? You develop a temperature of 101 degrees F or greater.    ADDITIONAL INSTRUCTIONS:   ? If you are taking a blood thinner, you may resume your medication at your regular dose today.  Follow up with your physician to have your INR rechecked if indicated.  ? You may use over the counter pain reliever, do not use aspirin for 24 hours.    Contacts:  During business hours from 8 to 5 pm, you may call [phone] to reach a nurse advisor.  After hours call [facility] [phone].  Ask for the Radiologist on call.  Someone is on call 24 hrs/day.  [facility] Toll Free Number   [phone]

## 2019-07-10 ENCOUNTER — HOSPITAL ENCOUNTER (OUTPATIENT)
Dept: PHYSICAL THERAPY | Facility: CLINIC | Age: 54
Setting detail: THERAPIES SERIES
End: 2019-07-10
Attending: FAMILY MEDICINE
Payer: COMMERCIAL

## 2019-07-10 DIAGNOSIS — M54.12 CERVICAL RADICULOPATHY: ICD-10-CM

## 2019-07-10 PROCEDURE — 97161 PT EVAL LOW COMPLEX 20 MIN: CPT | Mod: GP | Performed by: PHYSICAL MEDICINE & REHABILITATION

## 2019-07-10 PROCEDURE — 97110 THERAPEUTIC EXERCISES: CPT | Mod: GP | Performed by: PHYSICAL MEDICINE & REHABILITATION

## 2019-07-10 PROCEDURE — 97140 MANUAL THERAPY 1/> REGIONS: CPT | Mod: GP | Performed by: PHYSICAL MEDICINE & REHABILITATION

## 2019-07-10 NOTE — PROGRESS NOTES
"   07/10/19 1400   General Information   Type of Visit Initial OP Ortho PT Evaluation   Start of Care Date 07/10/19   Referring Physician Kaleb Saenz, DO   Patient/Family Goals Statement decrease pain and improve mobility (improve mobility througout the day)   Orders Evaluate and Treat   Date of Order 05/13/19   Certification Required? No  (PREFERRED [employer])   Medical Diagnosis Cervical radiculopathy    Surgical/Medical history reviewed Yes   Precautions/Limitations no known precautions/limitations   General Information Comments PMHx per pt report: unremarkable   Body Part(s)   Body Part(s) Cervical Spine   Presentation and Etiology   Pertinent history of current problem (include personal factors and/or comorbidities that impact the POC) Pt arrived to PT today for chronic neck pain. Notes it started 6 years ago, had injection and sx seemed to go away. Sx returned progressively over the last 6 years. Notes he just got another injection recently but did not get the same relief of sx as prior injection. Has radiation of sx into R scap. Had sx radiating into R UE but has since improved with injection. Notes neck feels stiff (points to suboccipitals and upper traps).    Impairments A. Pain;D. Decreased ROM;E. Decreased flexibility;F. Decreased strength and endurance;L. Tingling;M. Locking or catching;N. Headaches   Functional Limitations perform activities of daily living;perform required work activities;perform desired leisure / sports activities   Symptom Location neck and B shoulders   How/Where did it occur From insidious onset;From Degenerative Joint Disease   Onset date of current episode/exacerbation 07/10/13  (\"started 6 years ago\")   Chronicity Chronic   Pain rating (0-10 point scale) Best (/10);Worst (/10)   Best (/10) 6   Worst (/10) 10   Pain quality B. Dull;C. Aching;D. Burning;E. Shooting;G. Cramping   Frequency of pain/symptoms A. Constant   Pain/symptoms are: Worse during the day "   Pain/symptoms exacerbated by C. Lifting;D. Carrying;G. Certain positions;H. Overhead reach;J. ADL   Pain/symptoms eased by C. Rest;E. Changing positions;F. Certain positions;G. Heat;H. Cold   Progression of symptoms since onset: Worsened   Current / Previous Interventions   Diagnostic Tests: X-ray;MRI   X-ray Results Results   X-ray results XR: Multilevel cervical spondylosis, worst at C6-7 with moderate disc space narrowing.   MRI Results Results   MRI results multilevel degenerative changes--See EPIC for further detail   Prior Level of Function   Prior Level of Function-Mobility independent   Prior Level of Function-ADLs independent   Current Level of Function   Current Community Support Family/friend caregiver   Patient role/employment history A. Employed   Employment Comments pt works as a director at [employer]   Current equipment-Gait/Locomotion None   Fall Risk Screen   Fall screen completed by PT   Have you fallen 2 or more times in the past year? No   Have you fallen and had an injury in the past year? No   Is patient a fall risk? No   Abuse Screen (yes response referral indicated)   Feels Unsafe at Home or Work/School no   Feels Threatened by Someone no   Does Anyone Try to Keep You From Having Contact with Others or Doing Things Outside Your Home? no   Physical Signs of Abuse Present no   Functional Scales   Functional Scales Other   Other Scales  NDI: 32% disability   Cervical Spine   Posture forward head, rounded shoulders B   Cervical Flexion ROM 40* (pain along L upper trap)   Cervical Extension ROM 39* (pain in center of cervical spine)   Cervical Right Side Bending ROM 45*   Cervical Left Side Bending ROM 45* (pain along L upper traps and suboccipitals)   Cervical Right Rotation ROM 70*    Cervical Left Rotation ROM 51* (pain along suboccipitals and upper cervical segments)   Thoracic Flexion ROM wnl (pt noted increased pulling at lower cervical segements)    Thoracic Extension ROM wnl (increased pain in back)   Thoracic Right Side Bending ROM wnl (increased pulling of thoracic spine and low back)   Thoracic Left Sidebending ROM  wnl   Thoracic Right Rotation wnl   Thoracic Left Rotation wnl (increased pulling between shoulder blades)   Shoulder AROM Screen all motions wnl (pt noted increased R shoulder pain with abd and ER)   Shoulder Shrug (C2-C4) Strength 5/5 B   Shoulder Abd (C5) Strength 5/5 B (increased sx in R shoulder)   Shoulder Add (C7) Strength 5/5 B   Shoulder ER (C5, C6) Strength 5/5 B (increased numbness and tingling Rshoulder)   Shoulder IR (C5, C6) Strength 5/5 B (increased numbness and tingling in R shoulder)   Elbow Flexion (C5, C6) Strength 5/5 B (increased pain in R shoulder)   Elbow Extension (C7) Strength 5/5 B   Wrist Extension (C6) Strength 5/5 B   Wrist Flexion (C7) Strength 5/5 B   Thumb Abd (C8) Strength 5/5 B   5th Finger Add (T1) Strength 5/5 B   Upper Trapezius Flexibility limited   Levator Scapula Flexibility limited   Scalene Flexibility limited   Pectoralis Minor Flexibility limited   Vertebral Artery Test neg B   Alar Ligament Test normal B   Transverse Ligament Test normal B   Spurling Test pos B   Cervical Distraction Test pos   Neck Flexor Endurance Test (normal 39 sec) poor   Cervical Rotation/Lateral Flexion Test pos B   Neer Impingement Test neg    Roberts Impingement Test neg   Relocation Test neg   Sulcus Sign neg   Cervical/Shoulder Special Tests Comments Special tests only performed on R shoulder as pt only has complaints of pain on this side. No increase in sx with testing. Notes he had prior injury to R shoulder with fall and recieved PT.    Segmental Mobility-Cervical hypomobility of L C2-C6 in downglide   Palpation pt noted min increase in sx while laying supine in L suboccipitals and cervical paraspinals. No increase in sx with palpation of B shoulder or R cervical soft tissues/bony prominances.    Dermatome/Sensory  Testing pt noted min decraese in sx in R C6 dermatome compared to L with light touch   Planned Therapy Interventions   Planned Therapy Interventions ADL retraining;IADL retraining;joint mobilization;manual therapy;motor coordination training;neuromuscular re-education;orthotic fitting/training;ROM;strengthening;stretching   Planned Modality Interventions   Planned Modality Interventions Biofeedback;Contrast bath immersion;Cryotherapy;Electrical stimulation;Hot packs;Iontophoresis;Low level laser therapy;Shortwave diathermy;TENS;Traction;Ultrasound   Clinical Impression   Criteria for Skilled Therapeutic Interventions Met yes, treatment indicated   PT Diagnosis chronic neck pain   Influenced by the following impairments decreased ROM, impaired posture, radicular sx, pain   Functional limitations due to impairments ADLs, lifting, sitting at desk at work, driving, sleeping, recreational activites   Clinical Presentation Stable/Uncomplicated   Clinical Presentation Rationale PLOF, few comorbidities   Clinical Decision Making (Complexity) Low complexity   Therapy Frequency 1 time/week   Predicted Duration of Therapy Intervention (days/wks) 8 weeks   Risk & Benefits of therapy have been explained Yes   Patient, Family & other staff in agreement with plan of care Yes   Clinical Impression Comments Pt is 54 y.o. male who presented to the PT clinic today with a rehab diagnosis of chronic neck pain as evidenced by decreased ROM, impaired posture, radicular sx and pain. Pt is appropriate for skilled PT to address previously listed impairments in order to decrease difficulty with driving, work and ADLs.    Education Assessment   Preferred Learning Style Listening;Reading;Demonstration;Pictures/video   Barriers to Learning No barriers   ORTHO GOALS   PT Ortho Eval Goals 1;2;3   Ortho Goal 1   Goal Identifier 1   Goal Description Pt will increase L cervical rotation to 70* in order to equal R and decrease difficulty with driving.    Target Date 08/07/19   Ortho Goal 2   Goal Identifier 2   Goal Description Pt will be able to sit/stand throughout therapy session with good posture, no verbal cues, in order to decrease difficulty with work and ADLs.    Target Date 08/21/19   Ortho Goal 3   Goal Identifier 3   Goal Description Pt will be independent with HEP in order to self manage symptoms.    Target Date 09/06/19   Total Evaluation Time   PT Davinaal, Low Complexity Minutes (35180) 25       Please contact me with any questions or concerns.    Thank you for your referral,     Corin Gomez, PT, DPT  Physical Therapist  [address] [address] [phone]

## 2019-08-12 NOTE — ADDENDUM NOTE
Encounter addended by: Corin Gomez, PT on: 8/12/2019 1:15 PM   Actions taken: Sign clinical note, Episode resolved

## 2019-08-12 NOTE — PROGRESS NOTES
Outpatient Physical Therapy Discharge Note     Patient: Karthik Bird  : 1965    Beginning/End Dates of Reporting Period:  7/10/19 to 7/10/19    Referring Provider: Kaleb Saenz,     Therapy Diagnosis: chronic neck pain    Patient did not return for follow up treatments as directed.  Goal status and current objective information is therefore unknown.  Discharge from PT services at this time for this episode of treatment. Please see attached documentation under this episode of care for further information including dates of service, start of care date, referring physician, Dx, treatment plan, treatments, etc.    Please contact me with any questions or concerns.    Thank you for your referral.    Corin Gomez, PT, DPT  Physical Therapist   [address] [address] [phone]

## 2019-11-15 ENCOUNTER — MYC REFILL (OUTPATIENT)
Dept: FAMILY MEDICINE | Facility: CLINIC | Age: 54
End: 2019-11-15

## 2019-11-15 DIAGNOSIS — G43.709 CHRONIC MIGRAINE WITHOUT AURA WITHOUT STATUS MIGRAINOSUS, NOT INTRACTABLE: ICD-10-CM

## 2019-11-15 RX ORDER — SUMATRIPTAN 25 MG/1
25-50 TABLET, FILM COATED ORAL
Qty: 30 TABLET | Refills: 0 | Status: SHIPPED | OUTPATIENT
Start: 2019-11-15 | End: 2021-03-17

## 2019-11-15 NOTE — TELEPHONE ENCOUNTER
"Requested Prescriptions   Pending Prescriptions Disp Refills     SUMAtriptan (IMITREX) 25 MG tablet 30 tablet 1     Sig: Take 1-2 tablets (25-50 mg) by mouth at onset of headache May repeat in 1-2 hours after onset.       Serotonin Agonists Failed - 11/15/2019  8:45 AM        Failed - Serotonin Agonist request needs review.     Please review patient's record. If patient has had 8 or more treatments in the past month, please forward to provider.          Passed - Blood pressure under 140/90 in past 12 months     BP Readings from Last 3 Encounters:   06/11/19 114/71   05/07/19 122/80   11/13/18 116/68                 Passed - Recent (12 mo) or future (30 days) visit within the authorizing provider's specialty     Patient has had an office visit with the authorizing provider or a provider within the authorizing providers department within the previous 12 mos or has a future within next 30 days. See \"Patient Info\" tab in inbasket, or \"Choose Columns\" in Meds & Orders section of the refill encounter.              Passed - Medication is active on med list        Passed - Patient is age 18 or older        SUMAtriptan (IMITREX) 25 MG tablet  Last Written Prescription Date:  01/14/2016  Last Fill Quantity: 30 tablet,  # refills: 1   Last office visit: 5/7/2019 with prescribing provider:  AMY Ramirez   Future Office Visit:      Vee BELLE (BETO) (M)    "

## 2020-03-02 ENCOUNTER — HEALTH MAINTENANCE LETTER (OUTPATIENT)
Age: 55
End: 2020-03-02

## 2020-12-20 ENCOUNTER — HEALTH MAINTENANCE LETTER (OUTPATIENT)
Age: 55
End: 2020-12-20

## 2021-03-17 ENCOUNTER — OFFICE VISIT (OUTPATIENT)
Dept: FAMILY MEDICINE | Facility: CLINIC | Age: 56
End: 2021-03-17
Payer: COMMERCIAL

## 2021-03-17 VITALS
RESPIRATION RATE: 16 BRPM | TEMPERATURE: 98 F | SYSTOLIC BLOOD PRESSURE: 120 MMHG | DIASTOLIC BLOOD PRESSURE: 86 MMHG | HEART RATE: 82 BPM | HEIGHT: 74 IN | OXYGEN SATURATION: 97 % | WEIGHT: 240 LBS | BODY MASS INDEX: 30.8 KG/M2

## 2021-03-17 DIAGNOSIS — H93.13 TINNITUS, BILATERAL: ICD-10-CM

## 2021-03-17 DIAGNOSIS — Z12.11 COLON CANCER SCREENING: ICD-10-CM

## 2021-03-17 DIAGNOSIS — H81.10 BENIGN PAROXYSMAL POSITIONAL VERTIGO, UNSPECIFIED LATERALITY: ICD-10-CM

## 2021-03-17 DIAGNOSIS — N52.9 ERECTILE DYSFUNCTION, UNSPECIFIED ERECTILE DYSFUNCTION TYPE: ICD-10-CM

## 2021-03-17 DIAGNOSIS — Z12.5 SCREENING FOR PROSTATE CANCER: ICD-10-CM

## 2021-03-17 DIAGNOSIS — G43.709 CHRONIC MIGRAINE WITHOUT AURA WITHOUT STATUS MIGRAINOSUS, NOT INTRACTABLE: ICD-10-CM

## 2021-03-17 DIAGNOSIS — Z00.00 ROUTINE GENERAL MEDICAL EXAMINATION AT A HEALTH CARE FACILITY: Primary | ICD-10-CM

## 2021-03-17 PROCEDURE — 99396 PREV VISIT EST AGE 40-64: CPT | Performed by: FAMILY MEDICINE

## 2021-03-17 PROCEDURE — 99213 OFFICE O/P EST LOW 20 MIN: CPT | Mod: 25 | Performed by: FAMILY MEDICINE

## 2021-03-17 RX ORDER — SUMATRIPTAN 25 MG/1
25-50 TABLET, FILM COATED ORAL
Qty: 30 TABLET | Refills: 1 | Status: SHIPPED | OUTPATIENT
Start: 2021-03-17

## 2021-03-17 ASSESSMENT — ENCOUNTER SYMPTOMS
FEVER: 0
SHORTNESS OF BREATH: 0
SORE THROAT: 0
DIARRHEA: 0
DYSURIA: 0
CONSTIPATION: 0
NERVOUS/ANXIOUS: 0
HEMATURIA: 0
JOINT SWELLING: 0
PALPITATIONS: 0
CHILLS: 0
FREQUENCY: 0
PARESTHESIAS: 0
COUGH: 0
DIZZINESS: 1
ARTHRALGIAS: 1
HEADACHES: 0
NAUSEA: 0
HEARTBURN: 0
EYE PAIN: 0
ABDOMINAL PAIN: 0
WEAKNESS: 0
MYALGIAS: 0
HEMATOCHEZIA: 0

## 2021-03-17 ASSESSMENT — MIFFLIN-ST. JEOR: SCORE: 1988.38

## 2021-03-17 NOTE — PATIENT INSTRUCTIONS
Preventive Health Recommendations  Male Ages 50 - 64    Yearly exam:             See your health care provider every year in order to  o   Review health changes.   o   Discuss preventive care.    o   Review your medicines if your doctor has prescribed any.     Have a cholesterol test every 5 years, or more frequently if you are at risk for high cholesterol/heart disease.     Have a diabetes test (fasting glucose) every three years. If you are at risk for diabetes, you should have this test more often.     Have a colonoscopy at age 50, or have a yearly FIT test (stool test). These exams will check for colon cancer.      Talk with your health care provider about whether or not a prostate cancer screening test (PSA) is right for you.    You should be tested each year for STDs (sexually transmitted diseases), if you re at risk.     Shots: Get a flu shot each year. Get a tetanus shot every 10 years.     Nutrition:    Eat at least 5 servings of fruits and vegetables daily.     Eat whole-grain bread, whole-wheat pasta and brown rice instead of white grains and rice.     Get adequate Calcium and Vitamin D.     Lifestyle    Exercise for at least 150 minutes a week (30 minutes a day, 5 days a week). This will help you control your weight and prevent disease.     Limit alcohol to one drink per day.     No smoking.     Wear sunscreen to prevent skin cancer.     See your dentist every six months for an exam and cleaning.     See your eye doctor every 1 to 2 years.            Thank you for choosing [facility].  You may be receiving an email and/or telephone survey request from [facility] Customer Experience regarding your visit today.  Please take a few minutes to respond to the survey to let us know how we are doing.      If you have questions or concerns, please contact us via Vidmind or you can contact your care team at [phone].    Our Clinic hours are:  Monday 6:40 am  to 7:00 pm  Tuesday -Friday 6:40 am to 5:00  "pm    The [place] outpatient lab hours are:  Monday - Friday 6:10 am to 4:45 pm  Saturdays 7:00 am to 11:00 am  Appointments are required, call [phone]    If you have clinical questions after hours or would like to schedule an appointment,  call the clinic at [phone].        ASSESSMENT/PLAN:   1. Routine general medical examination at a health care facility  COUNSELING:  Reviewed preventive health counseling, as reflected in patient instructions       Regular exercise       Healthy diet/nutrition       Vision screening       Hearing screening  Estimated body mass index is 30.81 kg/m  as calculated from the following:    Height as of this encounter: 1.88 m (6' 2\").    Weight as of this encounter: 108.9 kg (240 lb).  He reports that he has never smoked. He has never used smokeless tobacco.  Counseling Resources:  ATP IV Guidelines  Pooled Cohorts Equation Calculator  FRAX Risk Assessment  ICSI Preventive Guidelines  Dietary Guidelines for Americans, 2010  [employer] MyPlate  ASA Prophylaxis  Lung CA Screening  For the future labs call [phone] and fast for 8 hours. These will be called.   - Glucose; Future  - Lipid panel reflex to direct LDL Fasting; Future      2. Colon cancer screening  Call [phone] to schedule the colonoscopy.   - GASTROENTEROLOGY ADULT REF PROCEDURE ONLY; Future    3. Benign paroxysmal positional vertigo, unspecified laterality  For the vertigo be careful with ladders and heights and swimming.   Be careful driving. The PT referral is done and call for that.   - PHYSICAL THERAPY REFERRAL; Future    4. Tinnitus, bilateral  For the ears and possible tinnitus, the referrals are done for Audiology and ENT.   Call [phone] in [place]   - OTOLARYNGOLOGY REFERRAL  - AUDIOLOGY ADULT REFERRAL; Future    5. Chronic migraine without aura without status migrainosus, not intractable  Refills are done with instructions. Identify triggers to avoid.   - SUMAtriptan (IMITREX) 25 MG tablet; Take " 1-2 tablets (25-50 mg) by mouth at onset of headache May repeat in 1-2 hours after onset.  Dispense: 30 tablet; Refill: 1    6. Screening for prostate cancer  We will call the results. Monitor for the symptoms.   - Prostate spec antigen screen; Future    7. Erectile dysfunction, unspecified erectile dysfunction type  - Testosterone, total; Future  Patient has been advised of split billing requirements and indicates understanding:

## 2021-03-17 NOTE — PROGRESS NOTES
3  SUBJECTIVE:   CC: Karthik Bird is an 55 year old male who presents for preventive health visit.       Patient has been advised of split billing requirements and indicates understanding: Yes  Healthy Habits:    Do you get at least three servings of calcium containing foods daily (dairy, green leafy vegetables, etc.)? yes    Amount of exercise or daily activities, outside of work: 1 day(s) per week    Problems taking medications regularly No    Medication side effects: No    Have you had an eye exam in the past two years? no    Do you see a dentist twice per year? yes    Do you have sleep apnea, excessive snoring or daytime drowsiness? Daytime drowsiness.       Chief Complaint   Patient presents with     Physical     General physical exam.       Hearing Problem     He has noticed a persistant insect chirping sound in the ears.     Colonoscopy     He is due for a colonoscopy, this is every 5 years due to polyp.     Shoulder     He is still having shoulder pain.  He has tried Physical Therapy, Chiropractor and other treatments.  History of neck issues and is not sure if the neck and shoulder pain are related.  Still having pain in the neck.  Still having epidoses of vertigo with certain positions and again not sure if neck related?     Erectile Dysfunction     Discuss about medication options, has tried Cialis in the past.         Current Outpatient Medications   Medication Instructions     naltrexone-bupropion (CONTRAVE) 8-90 MG per 12 hr tablet Take 2 tabs bymouth every a.m. and 1 tab by mouth every p.m.     SUMAtriptan (IMITREX) 25-50 mg, Oral, AT ONSET OF HEADACHE, May repeat in 1-2 hours after onset.       Patient Active Problem List   Diagnosis     Chronic migraine without aura, not intractable     Colon polyp     CARDIOVASCULAR SCREENING; LDL GOAL LESS THAN 160     Obesity (BMI 30.0-34.9)     ED (erectile dysfunction)     H/O rotator cuff tear     Cervical stenosis of spinal canal     Cervical radiculopathy          Today's PHQ-2 Score:   PHQ-2 ( 1999 [employer]) 3/17/2021 10/3/2018   Q1: Little interest or pleasure in doing things 0 1   Q2: Feeling down, depressed or hopeless 0 1   PHQ-2 Score 0 2   Q1: Little interest or pleasure in doing things Not at all -   Q2: Feeling down, depressed or hopeless Not at all -   PHQ-2 Score 0 -       Abuse: Current or Past(Physical, Sexual or Emotional)- No  Do you feel safe in your environment? Yes    Have you ever done Advance Care Planning? (For example, a Health Directive, POLST, or a discussion with a medical provider or your loved ones about your wishes): No, advance care planning information given to patient to review.  Patient declined advance care planning discussion at this time.    Social History     Tobacco Use     Smoking status: Never Smoker     Smokeless tobacco: Never Used   Substance Use Topics     Alcohol use: Yes     Comment: twice a week     If you drink alcohol do you typically have >3 drinks per day or >7 drinks per week? Not Applicable                      Last PSA: No results found for: PSA    Reviewed orders with patient. Reviewed health maintenance and updated orders accordingly - Yes  Reviewed and updated as needed this visit by clinical staff  Tobacco  Allergies  Meds            Reviewed and updated as needed this visit by Provider                  ROS:  CONSTITUTIONAL: NEGATIVE for fever, chills, change in weight  INTEGUMENTARY/SKIN: NEGATIVE for worrisome rashes, moles or lesions  EYES: NEGATIVE for vision changes or irritation  ENT: NEGATIVE for ear, mouth and throat problems  RESP: NEGATIVE for significant cough or SOB  CV: NEGATIVE for chest pain, palpitations or peripheral edema  GI: NEGATIVE for nausea, abdominal pain, heartburn, or change in bowel habits   male: negative for dysuria, hematuria, decreased urinary stream, erectile dysfunction, urethral discharge  MUSCULOSKELETAL: NEGATIVE for significant arthralgias or myalgia  NEURO: NEGATIVE for  "weakness, dizziness or paresthesias  PSYCHIATRIC: NEGATIVE for changes in mood or affect    OBJECTIVE:   /86   Pulse 82   Temp 98  F (36.7  C) (Tympanic)   Resp 16   Ht 1.88 m (6' 2\")   Wt 108.9 kg (240 lb)   SpO2 97%   BMI 30.81 kg/m    EXAM:  GENERAL APPEARANCE: healthy, alert and no distress  EYES: EOMI,  PERRL  HENT: ear canals and TM's normal and nose and mouth without ulcers or lesions  NECK: no adenopathy, no asymmetry, masses, or scars and thyroid normal to palpation  RESP: lungs clear to auscultation - no rales, rhonchi or wheezes  CV: regular rates and rhythm, normal S1 S2, no S3 or S4 and no murmur, click or rub -  ABDOMEN:  soft, nontender, no HSM or masses and bowel sounds normal  GU_male: testicles normal without atrophy or masses, no hernias and penis normal without urethral discharge  MS: extremities normal- no gross deformities noted, no evidence of inflammation in joints, FROM in all extremities.  SKIN: no suspicious lesions or rashes  NEURO: Normal strength and tone, sensory exam grossly normal, mentation intact and speech normal  PSYCH: mentation appears normal and affect normal/bright  LYMPHATICS: No axillary, cervical, inguinal, or supraclavicular nodes      ASSESSMENT/PLAN:   1. Routine general medical examination at a health care facility  COUNSELING:  Reviewed preventive health counseling, as reflected in patient instructions       Regular exercise       Healthy diet/nutrition       Vision screening       Hearing screening  Estimated body mass index is 30.81 kg/m  as calculated from the following:    Height as of this encounter: 1.88 m (6' 2\").    Weight as of this encounter: 108.9 kg (240 lb).  He reports that he has never smoked. He has never used smokeless tobacco.  Counseling Resources:  ATP IV Guidelines  Pooled Cohorts Equation Calculator  FRAX Risk Assessment  ICSI Preventive Guidelines  Dietary Guidelines for Americans, 2010  USDA's MyPlate  ASA Prophylaxis  Lung CA " Screening  For the future labs call [phone] and fast for 8 hours. These will be called.   - Glucose; Future  - Lipid panel reflex to direct LDL Fasting; Future      2. Colon cancer screening  Call [phone] to schedule the colonoscopy.   - GASTROENTEROLOGY ADULT REF PROCEDURE ONLY; Future    3. Benign paroxysmal positional vertigo, unspecified laterality  For the vertigo be careful with ladders and heights and swimming.   Be careful driving. The PT referral is done and call for that.   - PHYSICAL THERAPY REFERRAL; Future    4. Tinnitus, bilateral  For the ears and possible tinnitus, the referrals are done for Audiology and ENT.   Call [phone] in [facility].   - OTOLARYNGOLOGY REFERRAL  - AUDIOLOGY ADULT REFERRAL; Future    5. Chronic migraine without aura without status migrainosus, not intractable  Refills are done with instructions. Identify triggers to avoid.   - SUMAtriptan (IMITREX) 25 MG tablet; Take 1-2 tablets (25-50 mg) by mouth at onset of headache May repeat in 1-2 hours after onset.  Dispense: 30 tablet; Refill: 1    6. Screening for prostate cancer  We will call the results. Monitor for the symptoms.   - Prostate spec antigen screen; Future    7. Erectile dysfunction, unspecified erectile dysfunction type  - Testosterone, total; Future  Patient has been advised of split billing requirements and indicates understanding:       Kaleb Baker MD  [facility]

## 2021-03-23 DIAGNOSIS — Z11.59 ENCOUNTER FOR SCREENING FOR OTHER VIRAL DISEASES: ICD-10-CM

## 2021-03-24 ENCOUNTER — IMMUNIZATION (OUTPATIENT)
Dept: FAMILY MEDICINE | Facility: CLINIC | Age: 56
End: 2021-03-24
Payer: COMMERCIAL

## 2021-03-24 PROCEDURE — 91301 PR COVID VAC MODERNA 100 MCG/0.5 ML IM: CPT

## 2021-03-24 PROCEDURE — 0011A PR COVID VAC MODERNA 100 MCG/0.5 ML IM: CPT

## 2021-03-26 ENCOUNTER — OFFICE VISIT (OUTPATIENT)
Dept: FAMILY MEDICINE | Facility: CLINIC | Age: 56
End: 2021-03-26
Payer: COMMERCIAL

## 2021-03-26 VITALS
HEART RATE: 72 BPM | SYSTOLIC BLOOD PRESSURE: 126 MMHG | DIASTOLIC BLOOD PRESSURE: 80 MMHG | BODY MASS INDEX: 30.8 KG/M2 | RESPIRATION RATE: 16 BRPM | WEIGHT: 240 LBS | TEMPERATURE: 97.3 F | OXYGEN SATURATION: 97 % | HEIGHT: 74 IN

## 2021-03-26 DIAGNOSIS — G89.29 CHRONIC RIGHT SHOULDER PAIN: Primary | ICD-10-CM

## 2021-03-26 DIAGNOSIS — M47.22 OSTEOARTHRITIS OF SPINE WITH RADICULOPATHY, CERVICAL REGION: ICD-10-CM

## 2021-03-26 DIAGNOSIS — M25.511 CHRONIC RIGHT SHOULDER PAIN: Primary | ICD-10-CM

## 2021-03-26 PROCEDURE — 99214 OFFICE O/P EST MOD 30 MIN: CPT | Performed by: FAMILY MEDICINE

## 2021-03-26 ASSESSMENT — MIFFLIN-ST. JEOR: SCORE: 1988.38

## 2021-03-26 NOTE — PROGRESS NOTES
Subjective   Karthik is a 55 year old who presents for the following health issues    HPI     Musculoskeletal problem/pain  Onset/Duration: Years, has been worse the past year.  Description  Location: shoulder - right.  He is not sure if this is related to the stenosis in the neck or for an injury to the shoulder from falling 5-6 years ago.  Joint Swelling: no  Redness: no  Pain: YES  Warmth: no  Intensity:  mild, moderate  Progression of Symptoms:  worsening  Accompanying signs and symptoms:   Fevers: no  Numbness/tingling/weakness: YES- when laying down he can't  the right arm on its own.  History  Trauma to the area: See above for the stenosis for the neck and MOISES.  Falling for the right shoulder about 5-6 years ago.  Recent illness:  no  Previous similar problem: YES- See above.  Previous evaluation:  YES- Had previous imaging of the neck but not the shoulder since the fall.  Precipitating or alleviating factors:  Aggravating factors include: Laying down with his arms to his side.  Certain motions can make a sharp pain.  Therapies tried and outcome: Physical Therapy-has the bands at home-that makes it worse.    The May, 2019 cervical MRI showed:   Level by level:     C2-C3: There is no spinal canal or neural foraminal stenosis at this  level.     C3-C4: There is facet arthropathy bilaterally, uncinate hypertrophy  bilaterally and a posterior broad-based disc-osteophyte complex. These  findings result in mild bilateral neural foraminal narrowing but no  spinal canal stenosis.     C4-C5: There is facet arthropathy bilaterally, uncinate hypertrophy  bilaterally and a posterior broad-based disc-osteophyte complex with a  superimposed small posterior broad-based disc herniation (protrusion).  These findings result in moderate right foraminal stenosis, mild  spinal canal narrowing with mild indentation of the anterior central  aspect of the cervical spinal cord and minimal left  "foraminal  narrowing.     C5-C6: There is facet arthropathy bilaterally, uncinate hypertrophy  bilaterally and a posterior broad-based disc-osteophyte complex. These  findings result in moderate right foraminal stenosis, mild spinal  canal narrowing and mild left foraminal narrowing.     C6-C7: There is facet arthropathy bilaterally, uncinate hypertrophy  bilaterally and a posterior broad-based disc-osteophyte complex. These  findings result in moderate left foraminal stenosis, moderate-severe  right foraminal stenosis and mild spinal canal narrowing.     C7-T1: There is facet arthropathy bilaterally. There is minimal  bilateral neural foraminal narrowing but no spinal canal stenosis.       Current Outpatient Medications   Medication Instructions     SUMAtriptan (IMITREX) 25-50 mg, Oral, AT ONSET OF HEADACHE, May repeat in 1-2 hours after onset.       Patient Active Problem List   Diagnosis     Chronic migraine without aura, not intractable     Colon polyp     CARDIOVASCULAR SCREENING; LDL GOAL LESS THAN 160     Obesity (BMI 30.0-34.9)     ED (erectile dysfunction)     H/O rotator cuff tear     Cervical stenosis of spinal canal     Cervical radiculopathy       Blood pressure 126/80, pulse 72, temperature 97.3  F (36.3  C), temperature source Tympanic, resp. rate 16, height 1.88 m (6' 2\"), weight 108.9 kg (240 lb), SpO2 97 %.    Exam:  GENERAL APPEARANCE: healthy, alert and no distress  EYES: EOMI,  PERRL  NECK: reduced ROM of the neck turning to the right and extending. There is minimal tenderness of the cervical spine.   SKIN: no suspicious lesions or rashes  NEURO: Normal strength and tone, sensory exam grossly normal, mentation intact and speech normal  PSYCH: mentation appears normal and affect normal/bright    (M25.511,  G89.29) Chronic right shoulder pain  (primary encounter diagnosis)  Comment:   Plan: Orthopedic & Spine  Referral        We discussed to limit repetitive use of the right arm overhead, " throwing and out to the side.   Use ice and avoid heat on the shoulder. Use advil and tylenol as needed. The referral to Ortho is done.     (M47.22) Osteoarthritis of spine with radiculopathy, cervical region  Comment:   Plan: PHYSICAL THERAPY REFERRAL, MR Cervical Spine         w/o Contrast        We discussed the options and call for the PT appt. Modify activities. Use ice and Tylenol and advil.   If not better soon, then call [phone] to schedule the MRI. We will notify the results and recommendations.   If a cortisone shot is indicated then a referral to Pain Management would be clinic.     Kaleb Baker MD

## 2021-03-26 NOTE — PATIENT INSTRUCTIONS
Thank you for choosing [facility].  You may be receiving an email and/or telephone survey request from [facility] Customer Experience regarding your visit today.  Please take a few minutes to respond to the survey to let us know how we are doing.      If you have questions or concerns, please contact us via Savort or you can contact your care team at [phone].    Our Clinic hours are:  Monday 6:40 am  to 7:00 pm  Tuesday -Friday 6:40 am to 5:00 pm    The [place] outpatient lab hours are:  Monday - Friday 6:10 am to 4:45 pm  Saturdays 7:00 am to 11:00 am  Appointments are required, call [phone]    If you have clinical questions after hours or would like to schedule an appointment,  call the clinic at [phone].      (M25.511,  G89.29) Chronic right shoulder pain  (primary encounter diagnosis)  Comment:   Plan: Orthopedic & Spine  Referral        We discussed to limit repetitive use of the right arm overhead, throwing and out to the side.   Use ice and avoid heat on the shoulder. Use advil and tylenol as needed. The referral to Ortho is done.     (M47.22) Osteoarthritis of spine with radiculopathy, cervical region  Comment:   Plan: PHYSICAL THERAPY REFERRAL, MR Cervical Spine         w/o Contrast        We discussed the options and call for the PT appt. Modify activities. Use ice and Tylenol and advil.   If not better soon, then call [phone] to schedule the MRI. We will notify the results and recommendations.   If a cortisone shot is indicated then a referral to Pain Management would be clinic.

## 2021-03-31 ENCOUNTER — TRANSFERRED RECORDS (OUTPATIENT)
Dept: HEALTH INFORMATION MANAGEMENT | Facility: CLINIC | Age: 56
End: 2021-03-31

## 2021-04-01 DIAGNOSIS — N52.9 ERECTILE DYSFUNCTION, UNSPECIFIED ERECTILE DYSFUNCTION TYPE: ICD-10-CM

## 2021-04-01 DIAGNOSIS — Z00.00 ROUTINE GENERAL MEDICAL EXAMINATION AT A HEALTH CARE FACILITY: ICD-10-CM

## 2021-04-01 DIAGNOSIS — Z12.5 SCREENING FOR PROSTATE CANCER: ICD-10-CM

## 2021-04-01 LAB
CHOLEST SERPL-MCNC: 183 MG/DL
GLUCOSE SERPL-MCNC: 81 MG/DL (ref 70–99)
HDLC SERPL-MCNC: 47 MG/DL
LDLC SERPL CALC-MCNC: 106 MG/DL
NONHDLC SERPL-MCNC: 136 MG/DL
PSA SERPL-ACNC: 2.13 UG/L (ref 0–4)
TRIGL SERPL-MCNC: 151 MG/DL

## 2021-04-01 PROCEDURE — 36415 COLL VENOUS BLD VENIPUNCTURE: CPT | Performed by: FAMILY MEDICINE

## 2021-04-01 PROCEDURE — 82947 ASSAY GLUCOSE BLOOD QUANT: CPT | Performed by: FAMILY MEDICINE

## 2021-04-01 PROCEDURE — G0103 PSA SCREENING: HCPCS | Performed by: FAMILY MEDICINE

## 2021-04-01 PROCEDURE — 84403 ASSAY OF TOTAL TESTOSTERONE: CPT | Mod: 90 | Performed by: FAMILY MEDICINE

## 2021-04-01 PROCEDURE — 99000 SPECIMEN HANDLING OFFICE-LAB: CPT | Performed by: FAMILY MEDICINE

## 2021-04-01 PROCEDURE — 80061 LIPID PANEL: CPT | Performed by: FAMILY MEDICINE

## 2021-04-04 ENCOUNTER — MYC MEDICAL ADVICE (OUTPATIENT)
Dept: FAMILY MEDICINE | Facility: CLINIC | Age: 56
End: 2021-04-04

## 2021-04-04 LAB — TESTOST SERPL-MCNC: 483 NG/DL (ref 240–950)

## 2021-04-05 DIAGNOSIS — Z11.59 ENCOUNTER FOR SCREENING FOR OTHER VIRAL DISEASES: ICD-10-CM

## 2021-04-05 LAB
SARS-COV-2 RNA RESP QL NAA+PROBE: NORMAL
SPECIMEN SOURCE: NORMAL

## 2021-04-05 PROCEDURE — U0005 INFEC AGEN DETEC AMPLI PROBE: HCPCS | Performed by: SURGERY

## 2021-04-05 PROCEDURE — U0003 INFECTIOUS AGENT DETECTION BY NUCLEIC ACID (DNA OR RNA); SEVERE ACUTE RESPIRATORY SYNDROME CORONAVIRUS 2 (SARS-COV-2) (CORONAVIRUS DISEASE [COVID-19]), AMPLIFIED PROBE TECHNIQUE, MAKING USE OF HIGH THROUGHPUT TECHNOLOGIES AS DESCRIBED BY CMS-2020-01-R: HCPCS | Performed by: SURGERY

## 2021-04-06 LAB
LABORATORY COMMENT REPORT: NORMAL
SARS-COV-2 RNA RESP QL NAA+PROBE: NEGATIVE
SPECIMEN SOURCE: NORMAL

## 2021-04-07 ENCOUNTER — ANESTHESIA EVENT (OUTPATIENT)
Dept: GASTROENTEROLOGY | Facility: CLINIC | Age: 56
End: 2021-04-07
Payer: COMMERCIAL

## 2021-04-07 NOTE — ANESTHESIA PREPROCEDURE EVALUATION
Anesthesia Pre-Procedure Evaluation    Patient: Karthik Bird   MRN: [id number] : 1965        Preoperative Diagnosis: Colon cancer screening [Z12.11]   Procedure : Procedure(s):  COLONOSCOPY     Past Medical History:   Diagnosis Date     Migraine      Seasonal allergies       Past Surgical History:   Procedure Laterality Date     APPENDECTOMY      at 16 yo     COLONOSCOPY       GENITOURINARY SURGERY      previous vasectomy 2014     VASECTOMY  2014    Procedure: VASECTOMY;  Surgeon: Zaida Slaughter MD;  Location: [place] OR      Allergies   Allergen Reactions     Nka [No Known Allergies]       Social History     Tobacco Use     Smoking status: Never Smoker     Smokeless tobacco: Never Used   Substance Use Topics     Alcohol use: Yes     Comment: twice a week      Wt Readings from Last 1 Encounters:   21 108.9 kg (240 lb)        Anesthesia Evaluation   Pt has had prior anesthetic. Type: General and MAC.        ROS/MED HX  ENT/Pulmonary:  - neg pulmonary ROS     Neurologic:     (+) migraines,     Cardiovascular:  - neg cardiovascular ROS     METS/Exercise Tolerance:     Hematologic:  - neg hematologic  ROS     Musculoskeletal: Comment: Cervical stenosis of spinal canal  Cervical radiculopathy     - neg musculoskeletal ROS     GI/Hepatic:  - neg GI/hepatic ROS     Renal/Genitourinary:  - neg Renal ROS     Endo:     (+) Obesity,     Psychiatric/Substance Use:  - neg psychiatric ROS     Infectious Disease:  - neg infectious disease ROS     Malignancy:  - neg malignancy ROS     Other:  - neg other ROS          Physical Exam    Airway        Mallampati: II   TM distance: > 3 FB   Neck ROM: full   Mouth opening: > 3 cm    Respiratory Devices and Support         Dental  no notable dental history         Cardiovascular   cardiovascular exam normal          Pulmonary   pulmonary exam normal                OUTSIDE LABS:  CBC:   Lab Results   Component Value Date    WBC 7.6 2018    WBC 6.4  07/21/2017    HGB 15.6 11/13/2018    HGB 16.4 07/21/2017    HCT 45.2 11/13/2018    HCT 46.1 07/21/2017     11/13/2018     07/21/2017     BMP:   Lab Results   Component Value Date     07/21/2017     01/16/2015    POTASSIUM 4.1 07/21/2017    POTASSIUM 4.3 01/16/2015    CHLORIDE 104 07/21/2017    CHLORIDE 102 01/16/2015    CO2 28 07/21/2017    CO2 30 01/16/2015    BUN 17 07/21/2017    BUN 14 01/16/2015    CR 0.90 07/21/2017    CR 1.02 01/16/2015    GLC 81 04/01/2021    GLC 73 07/21/2017     COAGS: No results found for: PTT, INR, FIBR  POC: No results found for: BGM, HCG, HCGS  HEPATIC:   Lab Results   Component Value Date    ALBUMIN 3.9 07/21/2017    PROTTOTAL 7.1 07/21/2017    ALT 30 07/21/2017    AST 26 07/21/2017    ALKPHOS 66 07/21/2017    BILITOTAL 0.3 07/21/2017     OTHER:   Lab Results   Component Value Date    EN 8.7 07/21/2017    TSH 1.29 07/21/2017       Anesthesia Plan    ASA Status:  2      Anesthesia Type: MAC.   Induction: Intravenous.           Consents    Anesthesia Plan(s) and associated risks, benefits, and realistic alternatives discussed. Questions answered and patient/representative(s) expressed understanding.     - Discussed with:  Patient         Postoperative Care       PONV prophylaxis: Ondansetron (or other 5HT-3), Dexamethasone or Solumedrol     Comments:                Mayank Merida CRNA, APRN CRNA

## 2021-04-08 ENCOUNTER — ANESTHESIA (OUTPATIENT)
Dept: GASTROENTEROLOGY | Facility: CLINIC | Age: 56
End: 2021-04-08
Payer: COMMERCIAL

## 2021-04-08 ENCOUNTER — HOSPITAL ENCOUNTER (OUTPATIENT)
Facility: CLINIC | Age: 56
Discharge: HOME | End: 2021-04-08
Attending: SURGERY | Admitting: SURGERY
Payer: COMMERCIAL

## 2021-04-08 VITALS
BODY MASS INDEX: 30.8 KG/M2 | DIASTOLIC BLOOD PRESSURE: 66 MMHG | HEIGHT: 74 IN | HEART RATE: 63 BPM | WEIGHT: 240 LBS | OXYGEN SATURATION: 97 % | SYSTOLIC BLOOD PRESSURE: 102 MMHG | TEMPERATURE: 98 F | RESPIRATION RATE: 16 BRPM

## 2021-04-08 LAB — COLONOSCOPY: NORMAL

## 2021-04-08 PROCEDURE — 250N000009 HC RX 250: Performed by: SURGERY

## 2021-04-08 PROCEDURE — 370N000017 HC ANESTHESIA TECHNICAL FEE, PER MIN: Performed by: SURGERY

## 2021-04-08 PROCEDURE — G0121 COLON CA SCRN NOT HI RSK IND: HCPCS | Performed by: SURGERY

## 2021-04-08 PROCEDURE — 45378 DIAGNOSTIC COLONOSCOPY: CPT | Performed by: SURGERY

## 2021-04-08 PROCEDURE — 258N000003 HC RX IP 258 OP 636: Performed by: SURGERY

## 2021-04-08 PROCEDURE — 250N000009 HC RX 250: Performed by: NURSE ANESTHETIST, CERTIFIED REGISTERED

## 2021-04-08 PROCEDURE — 250N000011 HC RX IP 250 OP 636: Performed by: NURSE ANESTHETIST, CERTIFIED REGISTERED

## 2021-04-08 RX ORDER — ONDANSETRON 2 MG/ML
4 INJECTION INTRAMUSCULAR; INTRAVENOUS
Status: DISCONTINUED | OUTPATIENT
Start: 2021-04-08 | End: 2021-04-08 | Stop reason: HOSPADM

## 2021-04-08 RX ORDER — PROPOFOL 10 MG/ML
INJECTION, EMULSION INTRAVENOUS CONTINUOUS PRN
Status: DISCONTINUED | OUTPATIENT
Start: 2021-04-08 | End: 2021-04-08

## 2021-04-08 RX ORDER — PROPOFOL 10 MG/ML
INJECTION, EMULSION INTRAVENOUS PRN
Status: DISCONTINUED | OUTPATIENT
Start: 2021-04-08 | End: 2021-04-08

## 2021-04-08 RX ORDER — SODIUM CHLORIDE, SODIUM LACTATE, POTASSIUM CHLORIDE, CALCIUM CHLORIDE 600; 310; 30; 20 MG/100ML; MG/100ML; MG/100ML; MG/100ML
INJECTION, SOLUTION INTRAVENOUS CONTINUOUS
Status: DISCONTINUED | OUTPATIENT
Start: 2021-04-08 | End: 2021-04-08 | Stop reason: HOSPADM

## 2021-04-08 RX ORDER — LIDOCAINE 40 MG/G
CREAM TOPICAL
Status: DISCONTINUED | OUTPATIENT
Start: 2021-04-08 | End: 2021-04-08 | Stop reason: HOSPADM

## 2021-04-08 RX ADMIN — LIDOCAINE HYDROCHLORIDE 0.1 ML: 10 INJECTION, SOLUTION EPIDURAL; INFILTRATION; INTRACAUDAL; PERINEURAL at 07:51

## 2021-04-08 RX ADMIN — PROPOFOL 60 MG: 10 INJECTION, EMULSION INTRAVENOUS at 08:18

## 2021-04-08 RX ADMIN — PROPOFOL 200 MCG/KG/MIN: 10 INJECTION, EMULSION INTRAVENOUS at 08:18

## 2021-04-08 RX ADMIN — SODIUM CHLORIDE, POTASSIUM CHLORIDE, SODIUM LACTATE AND CALCIUM CHLORIDE: 600; 310; 30; 20 INJECTION, SOLUTION INTRAVENOUS at 07:51

## 2021-04-08 RX ADMIN — LIDOCAINE HYDROCHLORIDE 5 ML: 10 INJECTION, SOLUTION EPIDURAL; INFILTRATION; INTRACAUDAL; PERINEURAL at 08:18

## 2021-04-08 ASSESSMENT — MIFFLIN-ST. JEOR: SCORE: 1988.38

## 2021-04-08 NOTE — H&P
"55 year old year old male here for colonoscopy for screening.    Patient Active Problem List   Diagnosis     Chronic migraine without aura, not intractable     Colon polyp     CARDIOVASCULAR SCREENING; LDL GOAL LESS THAN 160     Obesity (BMI 30.0-34.9)     ED (erectile dysfunction)     H/O rotator cuff tear     Cervical stenosis of spinal canal     Cervical radiculopathy       Past Medical History:   Diagnosis Date     Migraine      Seasonal allergies        Past Surgical History:   Procedure Laterality Date     APPENDECTOMY      at 16 yo     COLONOSCOPY       GENITOURINARY SURGERY      previous vasectomy 2/2014     VASECTOMY  5/30/2014    Procedure: VASECTOMY;  Surgeon: Zaida Slaughter MD;  Location: [place] OR       @[facility]@     current outpatient medications on file.       Allergies   Allergen Reactions     Nka [No Known Allergies]        Pt reports that he has never smoked. He has never used smokeless tobacco. He reports current alcohol use. He reports that he does not use drugs.    Exam:  /82 (BP Location: Right arm)   Pulse 82   Temp 98  F (36.7  C) (Oral)   Ht 1.88 m (6' 2\")   Wt 108.9 kg (240 lb)   SpO2 96%   BMI 30.81 kg/m      Awake, Alert OX3  Lungs - CTA bilaterally  CV - RRR, no murmurs, distal pulses intact  Abd - soft, non-distended, non-tender, +BS  Extr - No cyanosis or edema    A/P 55 year old year old male in need of colonoscopy for screening. Risks, benefits, alternatives, and complications were discussed including the possibility of perforation and the patient agreed to proceed    Ruben Salmeron MD     "

## 2021-04-08 NOTE — ANESTHESIA CARE TRANSFER NOTE
Patient: Karthik Bird    Procedure(s):  COLONOSCOPY    Diagnosis: Colon cancer screening [Z12.11]  Diagnosis Additional Information: No value filed.    Anesthesia Type:   MAC     Note:      Level of Consciousness: awake  Oxygen Supplementation: room air    Independent Airway: airway patency satisfactory and stable      Report to RN Given: handoff report given  Patient transferred to: Phase II    Handoff Report: Identifed the Patient, Identified the Reponsible Provider, Reviewed the pertinent medical history, Discussed the surgical course, Reviewed Intra-OP anesthesia mangement and issues during anesthesia, Set expectations for post-procedure period and Allowed opportunity for questions and acknowledgement of understanding      Vitals: (Last set prior to Anesthesia Care Transfer)  CRNA VITALS  4/8/2021 0801 - 4/8/2021 0832      4/8/2021             Pulse:  72    Ht Rate:  96    SpO2:  95 %        Electronically Signed By: SUSY Pedersen CRNA  April 8, 2021  8:32 AM

## 2021-04-08 NOTE — ANESTHESIA POSTPROCEDURE EVALUATION
Patient: Karthik Bird    Procedure(s):  COLONOSCOPY    Diagnosis:Colon cancer screening [Z12.11]  Diagnosis Additional Information: No value filed.    Anesthesia Type:  MAC    Note:  Disposition: Outpatient   Postop Pain Control: Uneventful            Sign Out: Well controlled pain   PONV: No   Neuro/Psych: Uneventful            Sign Out: Acceptable/Baseline neuro status   Airway/Respiratory: Uneventful            Sign Out: AIRWAY IN SITU/Resp. Support   CV/Hemodynamics: Uneventful            Sign Out: Acceptable CV status   Other NRE: NONE   DID A NON-ROUTINE EVENT OCCUR? No         Last vitals:  Vitals:    04/08/21 0727   BP: 132/82   Pulse: 82   Temp: 36.7  C (98  F)   SpO2: 96%       Last vitals prior to Anesthesia Care Transfer:  CRNA VITALS  4/8/2021 0801 - 4/8/2021 0832      4/8/2021             Pulse:  72    Ht Rate:  96    SpO2:  95 %          Electronically Signed By: SUSY Pedersen CRNA  April 8, 2021  8:32 AM

## 2021-04-15 ENCOUNTER — TRANSFERRED RECORDS (OUTPATIENT)
Dept: HEALTH INFORMATION MANAGEMENT | Facility: CLINIC | Age: 56
End: 2021-04-15

## 2021-04-21 ENCOUNTER — IMMUNIZATION (OUTPATIENT)
Dept: FAMILY MEDICINE | Facility: CLINIC | Age: 56
End: 2021-04-21
Attending: FAMILY MEDICINE
Payer: COMMERCIAL

## 2021-04-21 PROCEDURE — 0012A PR COVID VAC MODERNA 100 MCG/0.5 ML IM: CPT

## 2021-04-21 PROCEDURE — 91301 PR COVID VAC MODERNA 100 MCG/0.5 ML IM: CPT

## 2021-05-17 ENCOUNTER — ALLIED HEALTH/NURSE VISIT (OUTPATIENT)
Dept: FAMILY MEDICINE | Facility: CLINIC | Age: 56
End: 2021-05-17
Payer: COMMERCIAL

## 2021-05-17 DIAGNOSIS — Z23 NEED FOR VACCINATION: Primary | ICD-10-CM

## 2021-05-17 PROCEDURE — 99207 PR NO CHARGE NURSE ONLY: CPT

## 2021-05-17 PROCEDURE — 90750 HZV VACC RECOMBINANT IM: CPT

## 2021-05-17 PROCEDURE — 90471 IMMUNIZATION ADMIN: CPT

## 2021-05-17 NOTE — NURSING NOTE
Chief Complaint   Patient presents with     Imm/Inj     Shingrix - patient checked with insurance and injection is covered in clinic .      Prior to immunization administration, verified patients identity using patient s name and date of birth. Please see Immunization Activity for additional information.     Screening Questionnaire for Adult Immunization    Are you sick today?   No   Do you have allergies to medications, food, a vaccine component or latex?   No   Have you ever had a serious reaction after receiving a vaccination?   No   Do you have a long-term health problem with heart, lung, kidney, or metabolic disease (e.g., diabetes), asthma, a blood disorder, no spleen, complement component deficiency, a cochlear implant, or a spinal fluid leak?  Are you on long-term aspirin therapy?   No   Do you have cancer, leukemia, HIV/AIDS, or any other immune system problem?   No   Do you have a parent, brother, or sister with an immune system problem?   No   In the past 3 months, have you taken medications that affect  your immune system, such as prednisone, other steroids, or anticancer drugs; drugs for the treatment of rheumatoid arthritis, Crohn s disease, or psoriasis; or have you had radiation treatments?   No   Have you had a seizure, or a brain or other nervous system problem?   No   During the past year, have you received a transfusion of blood or blood    products, or been given immune (gamma) globulin or antiviral drug?   No   For women: Are you pregnant or is there a chance you could become       pregnant during the next month?   No   Have you received any vaccinations in the past 4 weeks?   No     Immunization questionnaire answers were all negative.         Patient instructed to remain in clinic for 15 minutes afterwards, and to report any adverse reaction to me immediately.       Screening performed by Severo Aguilera CMA on 5/17/2021 at 1:16 PM.

## 2021-05-28 NOTE — PROGRESS NOTES
Chief Complaint   Patient presents with     Tinnitus     Check hearing/ears/Tinnitus has gotten worse in the last few months//issues with mucous and runny nose     History of Present Illness  Karthik Bird is a 56 year old male who presents today for evaluation.  I am seeing this patient in consultation for bilateral tinnitus at the request of the provider Dr. Baker.  The patient has noted ringing in both ears.  It has been present and noticeable for approximately several years.  It was not sudden in onset.  The patient has not has noticed increased difficulty hearing. There is no history of recent head trauma, chronic ear disease or ear surgery.  The patient does report some noise exposure [place] a farm and some recreational exposure.  No family history of hearing loss at a young age. No regular use of aspirin or NSAIDS.    Past Medical History  Patient Active Problem List   Diagnosis     Chronic migraine without aura, not intractable     Colon polyp     CARDIOVASCULAR SCREENING; LDL GOAL LESS THAN 160     Obesity (BMI 30.0-34.9)     ED (erectile dysfunction)     H/O rotator cuff tear     Cervical stenosis of spinal canal     Cervical radiculopathy     Current Medications     Current Outpatient Medications:      SUMAtriptan (IMITREX) 25 MG tablet, Take 1-2 tablets (25-50 mg) by mouth at onset of headache May repeat in 1-2 hours after onset. (Patient not taking: Reported on 6/3/2021), Disp: 30 tablet, Rfl: 1    Allergies  Allergies   Allergen Reactions     Nka [No Known Allergies]        Social History   Social History     Socioeconomic History     Marital status:      Spouse name: Not on file     Number of children: Not on file     Years of education: Not on file     Highest education level: Not on file   Occupational History     Not on file   Social Needs     Financial resource strain: Not on file     Food insecurity     Worry: Not on file     Inability: Not on file     Transportation needs      "Medical: Not on file     Non-medical: Not on file   Tobacco Use     Smoking status: Never Smoker     Smokeless tobacco: Never Used   Substance and Sexual Activity     Alcohol use: Yes     Comment: twice a week     Drug use: No     Sexual activity: Yes     Partners: Female   Lifestyle     Physical activity     Days per week: Not on file     Minutes per session: Not on file     Stress: Not on file   Relationships     Social connections     Talks on phone: Not on file     Gets together: Not on file     Attends Anabaptism service: Not on file     Active member of club or organization: Not on file     Attends meetings of clubs or organizations: Not on file     Relationship status: Not on file     Intimate partner violence     Fear of current or ex partner: Not on file     Emotionally abused: Not on file     Physically abused: Not on file     Forced sexual activity: Not on file   Other Topics Concern     Parent/sibling w/ CABG, MI or angioplasty before 65F 55M? Not Asked   Social History Narrative     Not on file       Family History  Family History   Problem Relation Age of Onset     Cancer Mother         lung- uterine     Liver Disease Father      Alzheimer Disease Maternal Grandmother         99     Pneumonia Maternal Grandfather         99       Review of Systems  As per HPI and PMHx, otherwise 10+ comprehensive system review is negative.    Physical Exam  /77 (BP Location: Right arm, Patient Position: Sitting, Cuff Size: Adult Large)   Pulse 86   Temp 98.1  F (36.7  C) (Tympanic)   Ht 1.88 m (6' 2\")   Wt 111.1 kg (245 lb)   BMI 31.46 kg/m    GENERAL: Patient is a pleasant, cooperative 56 year old male in no acute distress.  HEAD: Normocephalic, atraumatic.  Hair and scalp are normal.  EYES: Pupils are equal, round, reactive to light and accommodation.  Extraocular movements are intact.  The sclera nonicteric without injection.  The extraocular structures are normal.  EARS: Normal shape and symmetry.  No " tenderness when palpating the mastoid or tragal areas bilaterally.  Otoscopic exam reveals a minimal amount of cerumen bilaterally.  The bilateral tympanic membranes are round, intact without evidence of effusion, good landmarks.  No retraction, granulation, or drainage.  NEUROLOGIC: Cranial nerves II through XII are grossly intact.  Voice is strong.  Facial nerve examination incomplete due to the patient wearing a mask.  CARDIOVASCULAR: Extremities are warm and well-perfused.  No significant peripheral edema.  RESPIRATORY: Patient has nonlabored breathing without cough, wheeze, stridor.  PSYCHIATRIC: Patient is alert and oriented.  Mood and affect appear normal.  SKIN: Warm and dry.  No scalp, face, or neck lesions noted.    Audiogram  The patient underwent an audiogram performed today.  My review of the audiogram shows normal to 3000 Hz sloping to mild to moderate high-frequency sensorineural hearing loss in the right ear and normal to 2000 Hz sloping to mild to moderate high-frequency sensorineural hearing loss in the left ear.  Pure-tone average is 8 dB on the right and 14 dB on the left.  Speech reception threshhold is 5 dB on the right and 10 dB on the left.  The patient had 96% word recognition on the right and 100% word recognition on the left.  The patient had a type A tympanogram on the right and a type A tympanogram on the left.     Assessment and Plan     ICD-10-CM    1. Bilateral high frequency sensorineural hearing loss  H90.3    2. Tinnitus, bilateral  H93.13      It was my pleasure seeing Karthik Bird today in clinic.  The patient presents to me today with subjective tinnitus, likely due to his high-frequency sensorineural hearing loss.  I can find no evidence of serious CNS disorders or other complicating factors that could be causing this.  We spent the remainder of today's visit on education. Discussed were steps that can be taken to mask the noise, such as a low volume de-tuned radio, a fan in  the background, and/or hearing aids.  Correlation with stress, anxiety, and depression were also discussed.  The patient was also cautioned on the numerous expensive non-pharmaceutical options that are advertised, and have no proven benefit.    The patient will follow up as necessary for worsening symptoms or changes in symptoms. I have also recommended repeat audiogram in 3-5 years, or sooner if symptoms warrant.      Bhupinder Donato MD  Department of Otolarygology-Head and Neck Surgery  [facility]

## 2021-06-03 ENCOUNTER — OFFICE VISIT (OUTPATIENT)
Dept: AUDIOLOGY | Facility: CLINIC | Age: 56
End: 2021-06-03
Payer: COMMERCIAL

## 2021-06-03 ENCOUNTER — OFFICE VISIT (OUTPATIENT)
Dept: OTOLARYNGOLOGY | Facility: CLINIC | Age: 56
End: 2021-06-03
Payer: COMMERCIAL

## 2021-06-03 VITALS
BODY MASS INDEX: 31.44 KG/M2 | TEMPERATURE: 98.1 F | HEIGHT: 74 IN | DIASTOLIC BLOOD PRESSURE: 77 MMHG | WEIGHT: 245 LBS | HEART RATE: 86 BPM | SYSTOLIC BLOOD PRESSURE: 132 MMHG

## 2021-06-03 DIAGNOSIS — H93.13 TINNITUS OF BOTH EARS: ICD-10-CM

## 2021-06-03 DIAGNOSIS — H93.13 TINNITUS, BILATERAL: ICD-10-CM

## 2021-06-03 DIAGNOSIS — H90.3 BILATERAL HIGH FREQUENCY SENSORINEURAL HEARING LOSS: Primary | ICD-10-CM

## 2021-06-03 DIAGNOSIS — H90.3 SENSORINEURAL HEARING LOSS, BILATERAL: Primary | ICD-10-CM

## 2021-06-03 PROCEDURE — 92557 COMPREHENSIVE HEARING TEST: CPT | Performed by: AUDIOLOGIST

## 2021-06-03 PROCEDURE — 99203 OFFICE O/P NEW LOW 30 MIN: CPT | Performed by: OTOLARYNGOLOGY

## 2021-06-03 PROCEDURE — 92550 TYMPANOMETRY & REFLEX THRESH: CPT | Performed by: AUDIOLOGIST

## 2021-06-03 PROCEDURE — 99207 PR NO CHARGE LOS: CPT | Performed by: AUDIOLOGIST

## 2021-06-03 ASSESSMENT — MIFFLIN-ST. JEOR: SCORE: 2006.06

## 2021-06-03 NOTE — NURSING NOTE
"Initial /77 (BP Location: Right arm, Patient Position: Sitting, Cuff Size: Adult Large)   Pulse 86   Temp 98.1  F (36.7  C) (Tympanic)   Ht 1.88 m (6' 2\")   Wt 111.1 kg (245 lb)   BMI 31.46 kg/m   Estimated body mass index is 31.46 kg/m  as calculated from the following:    Height as of this encounter: 1.88 m (6' 2\").    Weight as of this encounter: 111.1 kg (245 lb). .    Sivan Diaz CMA    "

## 2021-06-03 NOTE — PATIENT INSTRUCTIONS
Per physician instructions      If you have questions or concerns on any instructions given to you by your provider today or if you need to schedule an appointment, you can reach us at [phone].     Tinnitus Resources  1. www.[facility].org (website with tinnitus resources)    Seven Things That Make Tinnitus Worse  1. Excess noise.  2. Excess caffeine.  3. Nicotine.  4. Stress.  5. Excess salt.  6. Excess aspirin.  7. Alcohol.    White Noise Products at Night To Help With Tinnitus (Bradley Cantor)

## 2021-06-03 NOTE — LETTER
6/3/2021         RE: Karthik Bird  [id number] [facility] [facility] [id number]        Dear Colleague,    Thank you for referring your patient, Karthik Bird, to the [facility]. Please see a copy of my visit note below.    Chief Complaint   Patient presents with     Tinnitus     Check hearing/ears/Tinnitus has gotten worse in the last few months//issues with mucous and runny nose     History of Present Illness  Karthik Bird is a 56 year old male who presents today for evaluation.  I am seeing this patient in consultation for bilateral tinnitus at the request of the provider Dr. Baker.  The patient has noted ringing in both ears.  It has been present and noticeable for approximately several years.  It was not sudden in onset.  The patient has not has noticed increased difficulty hearing. There is no history of recent head trauma, chronic ear disease or ear surgery.  The patient does report some noise exposure growing up on a farm and some recreational exposure.  No family history of hearing loss at a young age. No regular use of aspirin or NSAIDS.    Past Medical History  Patient Active Problem List   Diagnosis     Chronic migraine without aura, not intractable     Colon polyp     CARDIOVASCULAR SCREENING; LDL GOAL LESS THAN 160     Obesity (BMI 30.0-34.9)     ED (erectile dysfunction)     H/O rotator cuff tear     Cervical stenosis of spinal canal     Cervical radiculopathy     Current Medications     Current Outpatient Medications:      SUMAtriptan (IMITREX) 25 MG tablet, Take 1-2 tablets (25-50 mg) by mouth at onset of headache May repeat in 1-2 hours after onset. (Patient not taking: Reported on 6/3/2021), Disp: 30 tablet, Rfl: 1    Allergies  Allergies   Allergen Reactions     Nka [No Known Allergies]        Social History   Social History     Socioeconomic History     Marital status:      Spouse name: Not on file     Number of children: Not on file     Years of education:  "Not on file     Highest education level: Not on file   Occupational History     Not on file   Social Needs     Financial resource strain: Not on file     Food insecurity     Worry: Not on file     Inability: Not on file     Transportation needs     Medical: Not on file     Non-medical: Not on file   Tobacco Use     Smoking status: Never Smoker     Smokeless tobacco: Never Used   Substance and Sexual Activity     Alcohol use: Yes     Comment: twice a week     Drug use: No     Sexual activity: Yes     Partners: Female   Lifestyle     Physical activity     Days per week: Not on file     Minutes per session: Not on file     Stress: Not on file   Relationships     Social connections     Talks on phone: Not on file     Gets together: Not on file     Attends [employer] service: Not on file     Active member of club or organization: Not on file     Attends meetings of clubs or organizations: Not on file     Relationship status: Not on file     Intimate partner violence     Fear of current or ex partner: Not on file     Emotionally abused: Not on file     Physically abused: Not on file     Forced sexual activity: Not on file   Other Topics Concern     Parent/sibling w/ CABG, MI or angioplasty before 65F 55M? Not Asked   Social History Narrative     Not on file       Family History  Family History   Problem Relation Age of Onset     Cancer Mother         lung- uterine     Liver Disease Father      Alzheimer Disease Maternal Grandmother         99     Pneumonia Maternal Grandfather         99       Review of Systems  As per HPI and PMHx, otherwise 10+ comprehensive system review is negative.    Physical Exam  /77 (BP Location: Right arm, Patient Position: Sitting, Cuff Size: Adult Large)   Pulse 86   Temp 98.1  F (36.7  C) (Tympanic)   Ht 1.88 m (6' 2\")   Wt 111.1 kg (245 lb)   BMI 31.46 kg/m    GENERAL: Patient is a pleasant, cooperative 56 year old male in no acute distress.  HEAD: Normocephalic, atraumatic.  Hair " and scalp are normal.  EYES: Pupils are equal, round, reactive to light and accommodation.  Extraocular movements are intact.  The sclera nonicteric without injection.  The extraocular structures are normal.  EARS: Normal shape and symmetry.  No tenderness when palpating the mastoid or tragal areas bilaterally.  Otoscopic exam reveals a minimal amount of cerumen bilaterally.  The bilateral tympanic membranes are round, intact without evidence of effusion, good landmarks.  No retraction, granulation, or drainage.  NEUROLOGIC: Cranial nerves II through XII are grossly intact.  Voice is strong.  Facial nerve examination incomplete due to the patient wearing a mask.  CARDIOVASCULAR: Extremities are warm and well-perfused.  No significant peripheral edema.  RESPIRATORY: Patient has nonlabored breathing without cough, wheeze, stridor.  PSYCHIATRIC: Patient is alert and oriented.  Mood and affect appear normal.  SKIN: Warm and dry.  No scalp, face, or neck lesions noted.    Audiogram  The patient underwent an audiogram performed today.  My review of the audiogram shows normal to 3000 Hz sloping to mild to moderate high-frequency sensorineural hearing loss in the right ear and normal to 2000 Hz sloping to mild to moderate high-frequency sensorineural hearing loss in the left ear.  Pure-tone average is 8 dB on the right and 14 dB on the left.  Speech reception threshhold is 5 dB on the right and 10 dB on the left.  The patient had 96% word recognition on the right and 100% word recognition on the left.  The patient had a type A tympanogram on the right and a type A tympanogram on the left.     Assessment and Plan     ICD-10-CM    1. Bilateral high frequency sensorineural hearing loss  H90.3    2. Tinnitus, bilateral  H93.13      It was my pleasure seeing Karthik Bird today in clinic.  The patient presents to me today with subjective tinnitus, likely due to his high-frequency sensorineural hearing loss.  I can find no  evidence of serious CNS disorders or other complicating factors that could be causing this.  We spent the remainder of today's visit on education. Discussed were steps that can be taken to mask the noise, such as a low volume de-tuned radio, a fan in the background, and/or hearing aids.  Correlation with stress, anxiety, and depression were also discussed.  The patient was also cautioned on the numerous expensive non-pharmaceutical options that are advertised, and have no proven benefit.    The patient will follow up as necessary for worsening symptoms or changes in symptoms. I have also recommended repeat audiogram in 3-5 years, or sooner if symptoms warrant.      Bhupinder Donato MD  Department of Otolarygology-Head and Neck Surgery  [facility]         Again, thank you for allowing me to participate in the care of your patient.        Sincerely,        Bhupinder Donato MD

## 2021-06-03 NOTE — PROGRESS NOTES
AUDIOLOGY REPORT    SUBJECTIVE:  Karthik Bird is a 56 year old male who was seen at [facility] for an audiologic evaluation, referred by Dr. Donato.  No previous audiograms are available at today's appointment.  The patient reports a history of bilateral tinnitus for years. He notes a history of recreational noise exposure. He feels he is hearing well. The patient denies bilateral otalgia and family history of hearing loss.     OBJECTIVE:    Otoscopic exam indicates ears are clear of cerumen bilaterally       Pure Tone Thresholds assessed using conventional audiometry with good  reliability from 250-8000 Hz bilaterally using insert earphones and circumaural headphones     RIGHT:  normal, mild and moderate sensorineural hearing loss    LEFT:    normal, mild, moderate and moderate-severe sensorineural hearing loss    Tympanogram:    RIGHT: normal eardrum mobility    LEFT:   normal eardrum mobility    Reflexes (reported by stimulus ear 1000 Hz):     RIGHT: Ipsilateral is present    RIGHT: Contralateral is present    LEFT: Ipsilateral is present    LEFT: Contralateral is present    Speech Reception Threshold:    RIGHT: 5 dB HL    LEFT:   10 dB HL  Word Recognition Score:     RIGHT: 96% at 50 dB HL using NU-6 recorded word list.    LEFT:  100% at 50 dB HL using NU-6 recorded word list.      ASSESSMENT:   Normal hearing through 2000 Hz sloping to mild to moderate sensorineural hearing loss bilaterally.     Today s results were discussed with the patient in detail.     PLAN: It is recommended that the patient be seen by Dr. Donato for medical evaluation of their ears and hearing evaluation. Patient was counseled regarding hearing loss and impact on communication.  Reviewed possible origins of tinnitus and strategies for management.    Please call this clinic with questions regarding these results or recommendations.        Trixie Murrell M.A. TAYLOR-AAA  Clinical audiologist [place] # [id number]  6/3/2021

## 2021-07-06 ENCOUNTER — TELEPHONE (OUTPATIENT)
Dept: FAMILY MEDICINE | Facility: CLINIC | Age: 56
End: 2021-07-06

## 2021-07-06 DIAGNOSIS — G89.29 CHRONIC RIGHT SHOULDER PAIN: Primary | ICD-10-CM

## 2021-07-06 DIAGNOSIS — M25.511 CHRONIC RIGHT SHOULDER PAIN: Primary | ICD-10-CM

## 2021-07-06 NOTE — TELEPHONE ENCOUNTER
Dr. Pruitt,    Spoke to Sepideh at [facility].  This patient is having Rt. Shoulder arthroscopy with rotator cuff repair.  This patient is scheduled at the [facility] and not [facility] and this is why they need the referral.  Surgery is scheduled for 8/3/2021.  I have informed [facility] that Dr. Baker has retired.  Patient last seen 3/2021. Nuria JOHN RN

## 2021-07-06 NOTE — TELEPHONE ENCOUNTER
Reason for Call: Request for 2 referrals:    Referral being requested: Patient needs referral for surgery at [facility];  and a referral for Dr. Tobin Comfort    Date needed: as soon as possible    Has the patient been seen by the PCP for this problem? YES    Additional comments: Please call Josselyn at [facility] when referral are in. Phone [phone]    Phone number Patient can be reached at:  Other phone number:  Josselyn, [facility], [phone]    Best Time:  Any    Can we leave a detailed message on this number?  YES    Call taken on 7/6/2021 at 9:39 AM by Gwen Lara

## 2021-07-07 NOTE — TELEPHONE ENCOUNTER
Left message for the patient with the referral and the number to call if he doesn't hear from them to call and schedule himself. Nuria JOHN RN

## 2021-07-28 ENCOUNTER — OFFICE VISIT (OUTPATIENT)
Dept: FAMILY MEDICINE | Facility: CLINIC | Age: 56
End: 2021-07-28
Payer: COMMERCIAL

## 2021-07-28 VITALS
TEMPERATURE: 98.4 F | BODY MASS INDEX: 32.98 KG/M2 | OXYGEN SATURATION: 96 % | DIASTOLIC BLOOD PRESSURE: 70 MMHG | SYSTOLIC BLOOD PRESSURE: 114 MMHG | WEIGHT: 257 LBS | RESPIRATION RATE: 16 BRPM | HEIGHT: 74 IN | HEART RATE: 90 BPM

## 2021-07-28 DIAGNOSIS — L98.9 SKIN LESION: Primary | ICD-10-CM

## 2021-07-28 DIAGNOSIS — F33.1 MODERATE EPISODE OF RECURRENT MAJOR DEPRESSIVE DISORDER (H): ICD-10-CM

## 2021-07-28 PROCEDURE — 99213 OFFICE O/P EST LOW 20 MIN: CPT | Performed by: FAMILY MEDICINE

## 2021-07-28 ASSESSMENT — MIFFLIN-ST. JEOR: SCORE: 2060.49

## 2021-07-28 NOTE — PATIENT INSTRUCTIONS
Thank you for choosing [facility].  You may be receiving an email and/or telephone survey request from [facility] Customer Experience regarding your visit today.  Please take a few minutes to respond to the survey to let us know how we are doing.      If you have questions or concerns, please contact us via Spoke or you can contact your care team at [phone] option [phone].    Our Clinic hours are:  Monday - Thursday 7am-6pm  Friday 7am-5pm    The [place] outpatient lab hours are:  Monday - Friday 7am-4:30pm    Appointments are required, call [phone]    If you have clinical questions after hours or would like to schedule an appointment,  call the clinic at [phone].

## 2021-07-28 NOTE — PROGRESS NOTES
"    Assessment & Plan     Skin lesion  Patient referred to dermatology for further evaluation.  - Adult Dermatology Referral; Future      Moderate episode of recurrent major depressive disorder (H)  Stable      FUTURE APPOINTMENTS:       - Follow-up visit in one month or sooner as needed.    Return in about 4 weeks (around 8/25/2021) for Follow up.    Dwight Ruvalcaba MD  [facility] INDIGO [place] is a 56 year old who presents for the following health issues  accompanied by himself:    HPI     Patient is a 56-year-old male presenting to the clinic today for skin lesion on his left temple area that has been there for the last few months.  He reports that the skin lesion appears to be getting darker and larger.  He is worried about skin cancer would like this evaluated.  No history of melanoma or skin cancer in his family but mom has just recently diagnosed with ovarian cancer with metastases to the lungs.    Chief Complaint   Patient presents with     Derm Problem     Here to have skin checked for the left temple area.  It has been there for about 4-5 months.  The area is getting larger and darker in the last month.  No itching or bleeding.  No history of skin cancer.  His mother had ovarian cancer to the lung.         Review of Systems   Constitutional, HEENT, cardiovascular, pulmonary, gi and gu systems are negative, except as otherwise noted.      Objective    /70   Pulse 90   Temp 98.4  F (36.9  C) (Tympanic)   Resp 16   Ht 1.88 m (6' 2\")   Wt 116.6 kg (257 lb)   SpO2 96%   BMI 33.00 kg/m    Body mass index is 33 kg/m .  Physical Exam   GENERAL: healthy, alert and no distress  NECK: no adenopathy, no asymmetry, masses, or scars and thyroid normal to palpation  RESP: lungs clear to auscultation - no rales, rhonchi or wheezes  CV: regular rate and rhythm, normal S1 S2, no S3 or S4, no murmur, click or rub, no peripheral edema and peripheral pulses strong  ABDOMEN: " soft, nontender, no hepatosplenomegaly, no masses and bowel sounds normal  MS: no gross musculoskeletal defects noted, no edema  SKIN: macule - face macule - suspect basal carcinoma

## 2021-07-29 ENCOUNTER — TELEPHONE (OUTPATIENT)
Dept: DERMATOLOGY | Facility: CLINIC | Age: 56
End: 2021-07-29

## 2021-07-29 NOTE — TELEPHONE ENCOUNTER
[facility] Call Center    Phone Message    May a detailed message be left on voicemail: yes     Reason for Call: Pt is scheduled 09/14 and is on a wait list. Pt is concern about the spot that is changing and may be cancer. Please call Pt to discuss. Pt does have a referral. Thank you        Action Taken: Message routed to:  [facility] Derm    Travel Screening: Not Applicable

## 2021-07-30 NOTE — TELEPHONE ENCOUNTER
Spoke to patient who has been seen by PCP and referred to Derm already.    I did find a sooner cancellation for him, so rescheduled.     Carolyne Jeffries RN

## 2021-08-01 PROBLEM — F33.1 MODERATE EPISODE OF RECURRENT MAJOR DEPRESSIVE DISORDER (H): Status: ACTIVE | Noted: 2021-08-01

## 2021-08-10 ENCOUNTER — OFFICE VISIT (OUTPATIENT)
Dept: DERMATOLOGY | Facility: CLINIC | Age: 56
End: 2021-08-10
Payer: COMMERCIAL

## 2021-08-10 VITALS — HEART RATE: 82 BPM | SYSTOLIC BLOOD PRESSURE: 126 MMHG | OXYGEN SATURATION: 96 % | DIASTOLIC BLOOD PRESSURE: 81 MMHG

## 2021-08-10 DIAGNOSIS — L82.1 SEBORRHEIC KERATOSIS: ICD-10-CM

## 2021-08-10 DIAGNOSIS — L81.4 LENTIGO: Primary | ICD-10-CM

## 2021-08-10 DIAGNOSIS — D23.9 DERMAL NEVUS: ICD-10-CM

## 2021-08-10 DIAGNOSIS — L73.8 SENILE SEBACEOUS GLAND HYPERPLASIA: ICD-10-CM

## 2021-08-10 PROCEDURE — 99203 OFFICE O/P NEW LOW 30 MIN: CPT | Performed by: DERMATOLOGY

## 2021-08-10 NOTE — NURSING NOTE
"Initial /81 (BP Location: Left arm, Patient Position: Sitting)   Pulse 82   SpO2 96%  Estimated body mass index is 33 kg/m  as calculated from the following:    Height as of 7/28/21: 1.88 m (6' 2\").    Weight as of 7/28/21: 116.6 kg (257 lb). .      "

## 2021-08-10 NOTE — LETTER
8/10/2021         RE: Karthik Bird  [id number]   [place] [address]        Dear Colleague,    Thank you for referring your patient, aKrthik Bird, to the [facility]. Please see a copy of my visit note below.    Karthik Bird , a 56 year old year old male patient, I was asked to see by Dr. Ruvalcaba for spot on left temple.  Patient states this has been present for a while.  Patient reports the following symptoms:  growing .  Patient reports the following previous treatments none.  Patient reports the following modifying factors none.  Associated symptoms: none.  Patient has no other skin complaints today.  Remainder of the HPI, Meds, PMH, Allergies, FH, and SH was reviewed in chart.      Past Medical History:   Diagnosis Date     Migraine      Seasonal allergies        Past Surgical History:   Procedure Laterality Date     APPENDECTOMY      at 14 yo     COLONOSCOPY       COLONOSCOPY N/A 4/8/2021    Procedure: COLONOSCOPY;  Surgeon: Ruben Salmeron MD;  Location: [facility]     GENITOURINARY SURGERY      previous vasectomy 2/2014     VASECTOMY  5/30/2014    Procedure: VASECTOMY;  Surgeon: Zaida Slaughter MD;  Location: [facility]        Family History   Problem Relation Age of Onset     Cancer Mother         lung- uterine     Liver Disease Father      Alzheimer Disease Maternal Grandmother         99     Pneumonia Maternal Grandfather         99       Social History     Socioeconomic History     Marital status:      Spouse name: Not on file     Number of children: Not on file     Years of education: Not on file     Highest education level: Not on file   Occupational History     Not on file   Tobacco Use     Smoking status: Never Smoker     Smokeless tobacco: Never Used   Substance and Sexual Activity     Alcohol use: Yes     Comment: twice a year.     Drug use: No     Sexual activity: Yes     Partners: Female   Other Topics Concern     Parent/sibling w/ CABG, MI or  angioplasty before 65F 55M? Not Asked   Social History Narrative     Not on file     Social Determinants of Health     Financial Resource Strain:      Difficulty of Paying Living Expenses:    Food Insecurity:      Worried About Running Out of Food in the Last Year:      Ran Out of Food in the Last Year:    Transportation Needs:      Lack of Transportation (Medical):      Lack of Transportation (Non-Medical):    Physical Activity:      Days of Exercise per Week:      Minutes of Exercise per Session:    Stress:      Feeling of Stress :    Social Connections:      Frequency of Communication with Friends and Family:      Frequency of Social Gatherings with Friends and Family:      Attends Gnosticist Services:      Active Member of Clubs or Organizations:      Attends Club or Organization Meetings:      Marital Status:    Intimate Partner Violence:      Fear of Current or Ex-Partner:      Emotionally Abused:      Physically Abused:      Sexually Abused:        Outpatient Encounter Medications as of 8/10/2021   Medication Sig Dispense Refill     SUMAtriptan (IMITREX) 25 MG tablet Take 1-2 tablets (25-50 mg) by mouth at onset of headache May repeat in 1-2 hours after onset. (Patient not taking: Reported on 8/10/2021) 30 tablet 1     No facility-administered encounter medications on file as of 8/10/2021.             Review Of Systems  Skin: As above  Eyes: negative  Ears/Nose/Throat: negative  Respiratory: No shortness of breath, dyspnea on exertion, cough, or hemoptysis  Cardiovascular: negative  Gastrointestinal: negative  Genitourinary: negative  Musculoskeletal: negative  Neurologic: negative  Psychiatric: negative  Hematologic/Lymphatic/Immunologic: negative  Endocrine: negative      O:   NAD, WDWN, Alert & Oriented, Mood & Affect wnl, Vitals stable   Here today alone   /81 (BP Location: Left arm, Patient Position: Sitting)   Pulse 82   SpO2 96%    General appearance jose francisco ii   Vitals stable   Alert, oriented and  in no acute distress      Following lymph nodes palpated: Occipital, Cervical, Supraclavicular no lad   L temple stuck on papule   Yellow papule son face  Stuck on papules and brown macules on face  Flesh colored papules on face     The remainder of expanded problem focused exam was normal; the following areas were examined:  scalp/hair, conjunctiva/lids, face, neck, lips, chest, digits/nails, RUE, LUE.      Eyes: Conjunctivae/lids:Normal     ENT: Lips, buccal mucosa, tongue: normal    MSK:Normal    Cardiovascular: peripheral edema none    Pulm: Breathing Normal    Lymph Nodes: No Head and Neck Lymphadenopathy     Neuro/Psych: Orientation:Normal; Mood/Affect:Normal      A/P:  1. Seborrheic keratosis, lentigo, dermal nevus, sebaceous hyperplasia   It was a pleasure speaking to Karthik Bird today.  Previous clinic  notes and pertinent laboratory tests were reviewed prior to Karthik Bird's visit.  Nature of benign skin lesions dicussed with patient.  Signs and Symptoms of skin cancer discussed with patient.  Patient encouraged to perform monthly skin exams.  UV precautions reviewed with patient.  Risks of non-melanoma skin cancer discussed with patient   Return to clinic 12 months        Again, thank you for allowing me to participate in the care of your patient.        Sincerely,        Sky Jacobs MD

## 2021-08-10 NOTE — PROGRESS NOTES
Karthik Bird , a 56 year old year old male patient, I was asked to see by Dr. Ruvalcaba for spot on left temple.  Patient states this has been present for a while.  Patient reports the following symptoms:  growing .  Patient reports the following previous treatments none.  Patient reports the following modifying factors none.  Associated symptoms: none.  Patient has no other skin complaints today.  Remainder of the HPI, Meds, PMH, Allergies, FH, and SH was reviewed in chart.      Past Medical History:   Diagnosis Date     Migraine      Seasonal allergies        Past Surgical History:   Procedure Laterality Date     APPENDECTOMY      at 16 yo     COLONOSCOPY       COLONOSCOPY N/A 4/8/2021    Procedure: COLONOSCOPY;  Surgeon: Ruben Salmeron MD;  Location: [facility]     GENITOURINARY SURGERY      previous vasectomy 2/2014     VASECTOMY  5/30/2014    Procedure: VASECTOMY;  Surgeon: Zaida Slaughter MD;  Location: [facility]        Family History   Problem Relation Age of Onset     Cancer Mother         lung- uterine     Liver Disease Father      Alzheimer Disease Maternal Grandmother         99     Pneumonia Maternal Grandfather         99       Social History     Socioeconomic History     Marital status:      Spouse name: Not on file     Number of children: Not on file     Years of education: Not on file     Highest education level: Not on file   Occupational History     Not on file   Tobacco Use     Smoking status: Never Smoker     Smokeless tobacco: Never Used   Substance and Sexual Activity     Alcohol use: Yes     Comment: twice a year.     Drug use: No     Sexual activity: Yes     Partners: Female   Other Topics Concern     Parent/sibling w/ CABG, MI or angioplasty before 65F 55M? Not Asked   Social History Narrative     Not on file     Social Determinants of Health     Financial Resource Strain:      Difficulty of Paying Living Expenses:    Food Insecurity:      Worried About Running Out of Food in the  Last Year:      Ran Out of Food in the Last Year:    Transportation Needs:      Lack of Transportation (Medical):      Lack of Transportation (Non-Medical):    Physical Activity:      Days of Exercise per Week:      Minutes of Exercise per Session:    Stress:      Feeling of Stress :    Social Connections:      Frequency of Communication with Friends and Family:      Frequency of Social Gatherings with Friends and Family:      Attends Orthodox Services:      Active Member of Clubs or Organizations:      Attends Club or Organization Meetings:      Marital Status:    Intimate Partner Violence:      Fear of Current or Ex-Partner:      Emotionally Abused:      Physically Abused:      Sexually Abused:        Outpatient Encounter Medications as of 8/10/2021   Medication Sig Dispense Refill     SUMAtriptan (IMITREX) 25 MG tablet Take 1-2 tablets (25-50 mg) by mouth at onset of headache May repeat in 1-2 hours after onset. (Patient not taking: Reported on 8/10/2021) 30 tablet 1     No facility-administered encounter medications on file as of 8/10/2021.             Review Of Systems  Skin: As above  Eyes: negative  Ears/Nose/Throat: negative  Respiratory: No shortness of breath, dyspnea on exertion, cough, or hemoptysis  Cardiovascular: negative  Gastrointestinal: negative  Genitourinary: negative  Musculoskeletal: negative  Neurologic: negative  Psychiatric: negative  Hematologic/Lymphatic/Immunologic: negative  Endocrine: negative      O:   NAD, WDWN, Alert & Oriented, Mood & Affect wnl, Vitals stable   Here today alone   /81 (BP Location: Left arm, Patient Position: Sitting)   Pulse 82   SpO2 96%    General appearance jose francisco ii   Vitals stable   Alert, oriented and in no acute distress      Following lymph nodes palpated: Occipital, Cervical, Supraclavicular no lad   L temple stuck on papule   Yellow papule son face  Stuck on papules and brown macules on face  Flesh colored papules on face     The remainder of  expanded problem focused exam was normal; the following areas were examined:  scalp/hair, conjunctiva/lids, face, neck, lips, chest, digits/nails, RUE, LUE.      Eyes: Conjunctivae/lids:Normal     ENT: Lips, buccal mucosa, tongue: normal    MSK:Normal    Cardiovascular: peripheral edema none    Pulm: Breathing Normal    Lymph Nodes: No Head and Neck Lymphadenopathy     Neuro/Psych: Orientation:Normal; Mood/Affect:Normal      A/P:  1. Seborrheic keratosis, lentigo, dermal nevus, sebaceous hyperplasia   It was a pleasure speaking to Karthik Bird today.  Previous clinic  notes and pertinent laboratory tests were reviewed prior to Karthik Bird's visit.  Nature of benign skin lesions dicussed with patient.  Signs and Symptoms of skin cancer discussed with patient.  Patient encouraged to perform monthly skin exams.  UV precautions reviewed with patient.  Risks of non-melanoma skin cancer discussed with patient   Return to clinic 12 months

## 2021-08-16 ENCOUNTER — ALLIED HEALTH/NURSE VISIT (OUTPATIENT)
Dept: FAMILY MEDICINE | Facility: CLINIC | Age: 56
End: 2021-08-16
Payer: COMMERCIAL

## 2021-08-16 DIAGNOSIS — Z23 ENCOUNTER FOR IMMUNIZATION: Primary | ICD-10-CM

## 2021-08-16 PROCEDURE — 99207 PR NO CHARGE NURSE ONLY: CPT

## 2021-08-16 PROCEDURE — 90750 HZV VACC RECOMBINANT IM: CPT

## 2021-08-16 PROCEDURE — 90471 IMMUNIZATION ADMIN: CPT

## 2021-08-16 NOTE — PROGRESS NOTES
Prior to immunization administration, verified patients identity using patient s name and date of birth. Please see Immunization Activity for additional information.     Screening Questionnaire for Adult Immunization    Are you sick today?   No   Do you have allergies to medications, food, a vaccine component or latex?   No   Have you ever had a serious reaction after receiving a vaccination?   No   Do you have a long-term health problem with heart, lung, kidney, or metabolic disease (e.g., diabetes), asthma, a blood disorder, no spleen, complement component deficiency, a cochlear implant, or a spinal fluid leak?  Are you on long-term aspirin therapy?   No   Do you have cancer, leukemia, HIV/AIDS, or any other immune system problem?   No   Do you have a parent, brother, or sister with an immune system problem?   No   In the past 3 months, have you taken medications that affect  your immune system, such as prednisone, other steroids, or anticancer drugs; drugs for the treatment of rheumatoid arthritis, Crohn s disease, or psoriasis; or have you had radiation treatments?   No   Have you had a seizure, or a brain or other nervous system problem?   No   During the past year, have you received a transfusion of blood or blood    products, or been given immune (gamma) globulin or antiviral drug?   No   For women: Are you pregnant or is there a chance you could become       pregnant during the next month?   No   Have you received any vaccinations in the past 4 weeks?   No     Immunization questionnaire answers were all negative.        Per orders of Dr. Pruitt, injection of Shingrix given by Raquel Stewart CMA. Patient instructed to remain in clinic for 15 minutes afterwards, and to report any adverse reaction to me immediately.       Screening performed by Raquel Stewart CMA on 8/16/2021 at 3:04 PM.

## 2021-08-17 ENCOUNTER — E-VISIT (OUTPATIENT)
Dept: FAMILY MEDICINE | Facility: CLINIC | Age: 56
End: 2021-08-17
Payer: COMMERCIAL

## 2021-08-17 DIAGNOSIS — Z20.822 SUSPECTED COVID-19 VIRUS INFECTION: ICD-10-CM

## 2021-08-17 DIAGNOSIS — Z20.822 SUSPECTED COVID-19 VIRUS INFECTION: Primary | ICD-10-CM

## 2021-08-17 DIAGNOSIS — J02.9 SORE THROAT: ICD-10-CM

## 2021-08-17 LAB
DEPRECATED S PYO AG THROAT QL EIA: NEGATIVE
GROUP A STREP BY PCR: NOT DETECTED

## 2021-08-17 PROCEDURE — U0005 INFEC AGEN DETEC AMPLI PROBE: HCPCS

## 2021-08-17 PROCEDURE — U0003 INFECTIOUS AGENT DETECTION BY NUCLEIC ACID (DNA OR RNA); SEVERE ACUTE RESPIRATORY SYNDROME CORONAVIRUS 2 (SARS-COV-2) (CORONAVIRUS DISEASE [COVID-19]), AMPLIFIED PROBE TECHNIQUE, MAKING USE OF HIGH THROUGHPUT TECHNOLOGIES AS DESCRIBED BY CMS-2020-01-R: HCPCS

## 2021-08-17 PROCEDURE — 87651 STREP A DNA AMP PROBE: CPT

## 2021-08-17 PROCEDURE — 99421 OL DIG E/M SVC 5-10 MIN: CPT | Performed by: PHYSICIAN ASSISTANT

## 2021-08-17 NOTE — PATIENT INSTRUCTIONS
Dear Karthik Bird,    Your symptoms show that you may have coronavirus (COVID-19). This illness can cause fever, cough and trouble breathing. Many people get a mild case and get better on their own. Some people can get very sick.    Because you also reported sore throat I would like to also test you for Strep Throat to determine if we need to treat you for that as well.    What should I do?  We would like to test you for Covid-19 virus and Strep Throat. I have placed orders for these tests.   To schedule: go to your [facility] home page and scroll down to the section that says  You have an appointment that needs to be scheduled  and click the large green button that says  Schedule Now  and follow the steps to find the next available openings. It is important that when you are asked what the reason for your appointment is that you mention you need BOTH Covid and Strep tests.    If you are unable to complete these [facility] scheduling steps, please call [phone] to schedule your testing.     Return to work/school/ guidance:   Please let your workplace manager and staffing office know when your quarantine ends     We can t give you an exact date as it depends on the above. You can calculate this on your own or work with your manager/staffing office to calculate this. (For example if you were exposed on 10/4, you would have to quarantine for 14 full days. That would be through 10/18. You could return on 10/19.)      If you receive a positive COVID-19 test result, follow the guidance of the those who are giving you the results. Usually the return to work is 10 (or in some cases 20 days from symptom onset.) If you work at [employer], you must also be cleared by Employee Occupational Health and Safety to return to work.        If you receive a negative COVID-19 test result and did not have a high risk exposure to someone with a known positive COVID-19 test, you can return to work once you're free of fever  for 24 hours without fever-reducing medication and your symptoms are improving or resolved.      If you receive a negative COVID-19 test and If you had a high risk exposure to someone who has tested positive for COVID-19 then you can return to work 14 days after your last contact with the positive individual    Note: If you have ongoing exposure to the covid positive person, this quarantine period may be more than 14 days. (For example, if you are continued to be exposed to your child who tested positive and cannot isolate from them, then the quarantine of 7-14 days can't start until your child is no longer contagious. This is typically 10 days from onset of the child's symptoms. So the total duration may be 17-24 days in this case.)    Sign up for [employer].   We know it's scary to hear that you might have COVID-19. We want to track your symptoms to make sure you're okay over the next 2 weeks. Please look for an email from [employer]--this is a free, online program that we'll use to keep in touch. To sign up, follow the link in the email you will receive. Learn more at [email or web address]    How can I take care of myself?    Get lots of rest. Drink extra fluids (unless a doctor has told you not to)    Take Tylenol (acetaminophen) or ibuprofen for fever or pain. If you have liver or kidney problems, ask your family doctor if it's okay to take Tylenol o ibuprofen    If you have other health problems (like cancer, heart failure, an organ transplant or severe kidney disease): Call your specialty clinic if you don't feel better in the next 2 days.    Know when to call 911. Emergency warning signs include:  o Trouble breathing or shortness of breath  o Pain or pressure in the chest that doesn't go away  o Feeling confused like you haven't felt before, or not being able to wake up  o Bluish-colored lips or face    Where can I get more information?  [facility] - About COVID-19:    [email or web address]    CDC - What to Do If You're Sick:   www.cdc.gov/coronavirus/2019-ncov/about/steps-when-sick.html    August 17, 2021  RE:  Katrhik Bird                                                                                                                  [id number]   [place] [address]      To whom it may concern:    I evaluated Karthik Bird on August 17, 2021. Karthik Bird should be excused from work/school.     They should let their workplace manager and staffing office know when their quarantine ends.    We can not give an exact date as it depends on the information below. They can calculate this on their own or work with their manager/staffing office to calculate this. (For example if they were exposed on 10/04, they would have to quarantine for 14 full days. That would be through 10/18. They could return on 10/19.)    Quarantine Guidelines:      If patient receives a positive COVID-19 test result, they should follow the guidance of those who are giving the results. Usually the return to work is 10 (or in some cases 20 days from symptom onset.) If they work at [facility], they must be cleared by Employee Occupational Health and Safety to return to work.        If patient receives a negative COVID-19 test result and did not have a high risk exposure to someone with a known positive COVID-19 test, they can return to work once they're free of fever for 24 hours without fever-reducing medication and their symptoms are improving or resolved.      If patient receives a negative COVID-19 test and if they had a high risk exposure to someone who has tested positive for COVID-19 then they can return to work 14 days after their last contact with the positive individual    Note: If there is ongoing exposure to the covid positive person, this quarantine period may be longer than 14 days. (For example, if they are continually exposed to their child, who tested positive and  cannot isolate from them, then the quarantine of 7-14 days can't start until their child is no longer contagious. This is typically 10 days from onset to the child's symptoms. So the total duration may be 17-24 days in this case.)     Sincerely,  Sagar Arredondo PA-C

## 2021-08-18 LAB — SARS-COV-2 RNA RESP QL NAA+PROBE: NEGATIVE

## 2021-08-19 ENCOUNTER — TRANSFERRED RECORDS (OUTPATIENT)
Dept: HEALTH INFORMATION MANAGEMENT | Facility: CLINIC | Age: 56
End: 2021-08-19

## 2021-09-02 ENCOUNTER — TRANSFERRED RECORDS (OUTPATIENT)
Dept: HEALTH INFORMATION MANAGEMENT | Facility: CLINIC | Age: 56
End: 2021-09-02

## 2021-09-10 ENCOUNTER — TRANSCRIBE ORDERS (OUTPATIENT)
Dept: OTHER | Age: 56
End: 2021-09-10

## 2021-09-10 DIAGNOSIS — Z98.890 S/P RIGHT ROTATOR CUFF REPAIR: Primary | ICD-10-CM

## 2021-09-13 ENCOUNTER — HOSPITAL ENCOUNTER (OUTPATIENT)
Dept: PHYSICAL THERAPY | Facility: CLINIC | Age: 56
Setting detail: THERAPIES SERIES
End: 2021-09-13
Attending: ORTHOPAEDIC SURGERY
Payer: COMMERCIAL

## 2021-09-13 DIAGNOSIS — Z98.890 S/P RIGHT ROTATOR CUFF REPAIR: ICD-10-CM

## 2021-09-13 PROCEDURE — 97161 PT EVAL LOW COMPLEX 20 MIN: CPT | Mod: GP | Performed by: PHYSICAL THERAPIST

## 2021-09-13 PROCEDURE — 97110 THERAPEUTIC EXERCISES: CPT | Mod: GP | Performed by: PHYSICAL THERAPIST

## 2021-09-13 NOTE — PROGRESS NOTES
09/13/21 1300   General Information   Type of Visit Initial OP Ortho PT Evaluation   Start of Care Date 09/13/21   Referring Physician comfort   Orders Evaluate and Treat   Date of Order 09/06/21   Medical Diagnosis RCR, Bicep tenodesis, SAD   Surgical/Medical history reviewed Yes   Precautions/Limitations no known precautions/limitations   Body Part(s)   Body Part(s) Shoulder   Presentation and Etiology   Pertinent history of current problem (include personal factors and/or comorbidities that impact the POC) hx of 3 yr shoulder fall.  surgery 6 weeks ago.     Impairments A. Pain;D. Decreased ROM;E. Decreased flexibility;F. Decreased strength and endurance;J. Burning   Functional Limitations perform activities of daily living;perform desired leisure / sports activities;perform required work activities   How/Where did it occur With a fall   Onset date of current episode/exacerbation 08/03/21   Chronicity Recurrent   Pain rating (0-10 point scale) Best (/10);Worst (/10)   Best (/10) 1   Worst (/10) 5   Pain quality A. Sharp;C. Aching;B. Dull;D. Burning   Frequency of pain/symptoms A. Constant   Pain/symptoms are: Worse during the night   Pain/symptoms exacerbated by C. Lifting;D. Carrying;G. Certain positions;H. Overhead reach;J. ADL   Pain/symptoms eased by C. Rest;E. Changing positions;F. Certain positions;J. Braces/supports   Fall Risk Screen   Have you fallen 2 or more times in the past year? No   Have you fallen and had an injury in the past year? No   Is patient a fall risk? No   Abuse Screen (yes response referral indicated)   Feels Unsafe at Home or Work/School no   Feels Threatened by Someone no   Does Anyone Try to Keep You From Having Contact with Others or Doing Things Outside Your Home? no   Shoulder Objective Findings   Side (if bilateral, select both right and left) Right   Posture rounded   Scapulothoracic Rhythm UT sub   Right Shoulder Flexion AROM 40   Right Shoulder Flexion PROM 115   Right  Shoulder Abduction PROM 100   Right Shoulder ER AROM 25   Right Shoulder ER PROM 25   Right Shoulder IR AROM L5   Right Shoulder IR PROM at45 45   Planned Therapy Interventions   Planned Therapy Interventions ROM;strengthening;stretching;manual therapy;joint mobilization;neuromuscular re-education   Clinical Impression   Criteria for Skilled Therapeutic Interventions Met yes, treatment indicated   PT Diagnosis RCT, shoulder pain   Clinical Presentation Stable/Uncomplicated   Clinical Presentation Rationale RCR   Clinical Decision Making (Complexity) Low complexity   Therapy Frequency 1 time/week   Predicted Duration of Therapy Intervention (days/wks) 10wk   Risk & Benefits of therapy have been explained Yes   Patient, Family & other staff in agreement with plan of care Yes   Education Assessment   Preferred Learning Style Demonstration   Barriers to Learning No barriers   ORTHO GOALS   PT Ortho Eval Goals 1;2;3   Ortho Goal 1   Goal Identifier 1   Goal Description pt will be able to wash hair   Target Date 10/04/21   Ortho Goal 2   Goal Identifier 2   Goal Description pt will be able to reach top shelf   Target Date 10/18/21   Ortho Goal 3   Goal Identifier 3   Goal Description pt will be able to downhill ski   Target Date 01/13/22   Total Evaluation Time   PT Eval, Low Complexity Minutes (16673) 35

## 2021-09-15 ENCOUNTER — TRANSCRIBE ORDERS (OUTPATIENT)
Dept: OTHER | Age: 56
End: 2021-09-15

## 2021-09-22 ENCOUNTER — HOSPITAL ENCOUNTER (OUTPATIENT)
Dept: PHYSICAL THERAPY | Facility: CLINIC | Age: 56
Setting detail: THERAPIES SERIES
End: 2021-09-22
Attending: ORTHOPAEDIC SURGERY
Payer: COMMERCIAL

## 2021-09-22 PROCEDURE — 97110 THERAPEUTIC EXERCISES: CPT | Mod: GP | Performed by: PHYSICAL THERAPIST

## 2021-09-30 ENCOUNTER — TRANSFERRED RECORDS (OUTPATIENT)
Dept: HEALTH INFORMATION MANAGEMENT | Facility: CLINIC | Age: 56
End: 2021-09-30

## 2021-10-03 ENCOUNTER — HEALTH MAINTENANCE LETTER (OUTPATIENT)
Age: 56
End: 2021-10-03

## 2021-10-07 ENCOUNTER — HOSPITAL ENCOUNTER (OUTPATIENT)
Dept: PHYSICAL THERAPY | Facility: CLINIC | Age: 56
Setting detail: THERAPIES SERIES
End: 2021-10-07
Attending: ORTHOPAEDIC SURGERY
Payer: COMMERCIAL

## 2021-10-07 PROCEDURE — 97110 THERAPEUTIC EXERCISES: CPT | Mod: GP | Performed by: PHYSICAL THERAPIST

## 2021-10-28 ENCOUNTER — HOSPITAL ENCOUNTER (OUTPATIENT)
Dept: PHYSICAL THERAPY | Facility: CLINIC | Age: 56
Setting detail: THERAPIES SERIES
End: 2021-10-28
Attending: ORTHOPAEDIC SURGERY
Payer: COMMERCIAL

## 2021-10-28 PROCEDURE — 97110 THERAPEUTIC EXERCISES: CPT | Mod: GP | Performed by: PHYSICAL THERAPIST

## 2021-11-17 ENCOUNTER — TRANSFERRED RECORDS (OUTPATIENT)
Dept: HEALTH INFORMATION MANAGEMENT | Facility: CLINIC | Age: 56
End: 2021-11-17

## 2021-11-17 ENCOUNTER — HOSPITAL ENCOUNTER (OUTPATIENT)
Dept: PHYSICAL THERAPY | Facility: CLINIC | Age: 56
Setting detail: THERAPIES SERIES
End: 2021-11-17
Attending: ORTHOPAEDIC SURGERY
Payer: COMMERCIAL

## 2021-11-17 PROCEDURE — 97110 THERAPEUTIC EXERCISES: CPT | Mod: GP | Performed by: PHYSICAL THERAPIST

## 2021-12-08 ENCOUNTER — HOSPITAL ENCOUNTER (OUTPATIENT)
Dept: PHYSICAL THERAPY | Facility: CLINIC | Age: 56
Setting detail: THERAPIES SERIES
End: 2021-12-08
Attending: ORTHOPAEDIC SURGERY
Payer: COMMERCIAL

## 2021-12-08 PROCEDURE — 97110 THERAPEUTIC EXERCISES: CPT | Mod: GP | Performed by: PHYSICAL THERAPIST

## 2021-12-10 ENCOUNTER — IMMUNIZATION (OUTPATIENT)
Dept: NURSING | Facility: CLINIC | Age: 56
End: 2021-12-10
Payer: COMMERCIAL

## 2021-12-10 PROCEDURE — 0004A PR COVID VAC PFIZER DIL RECON 30 MCG/0.3 ML IM: CPT

## 2021-12-10 PROCEDURE — 91300 PR COVID VAC PFIZER DIL RECON 30 MCG/0.3 ML IM: CPT

## 2021-12-10 PROCEDURE — 90682 RIV4 VACC RECOMBINANT DNA IM: CPT

## 2021-12-10 PROCEDURE — 90471 IMMUNIZATION ADMIN: CPT

## 2021-12-14 ENCOUNTER — ANCILLARY PROCEDURE (OUTPATIENT)
Dept: GENERAL RADIOLOGY | Facility: CLINIC | Age: 56
End: 2021-12-14
Attending: FAMILY MEDICINE
Payer: COMMERCIAL

## 2021-12-14 ENCOUNTER — OFFICE VISIT (OUTPATIENT)
Dept: FAMILY MEDICINE | Facility: CLINIC | Age: 56
End: 2021-12-14
Payer: COMMERCIAL

## 2021-12-14 VITALS
OXYGEN SATURATION: 97 % | WEIGHT: 249 LBS | BODY MASS INDEX: 31.95 KG/M2 | SYSTOLIC BLOOD PRESSURE: 118 MMHG | RESPIRATION RATE: 18 BRPM | HEIGHT: 74 IN | TEMPERATURE: 97.5 F | DIASTOLIC BLOOD PRESSURE: 80 MMHG | HEART RATE: 83 BPM

## 2021-12-14 DIAGNOSIS — G89.29 CHRONIC PAIN OF RIGHT KNEE: Primary | ICD-10-CM

## 2021-12-14 DIAGNOSIS — G89.29 CHRONIC PAIN OF RIGHT KNEE: ICD-10-CM

## 2021-12-14 DIAGNOSIS — M25.561 CHRONIC PAIN OF RIGHT KNEE: Primary | ICD-10-CM

## 2021-12-14 DIAGNOSIS — M25.561 CHRONIC PAIN OF RIGHT KNEE: ICD-10-CM

## 2021-12-14 PROCEDURE — 99213 OFFICE O/P EST LOW 20 MIN: CPT | Performed by: FAMILY MEDICINE

## 2021-12-14 PROCEDURE — 73562 X-RAY EXAM OF KNEE 3: CPT | Mod: RT | Performed by: RADIOLOGY

## 2021-12-14 ASSESSMENT — MIFFLIN-ST. JEOR: SCORE: 2024.21

## 2021-12-14 ASSESSMENT — ANXIETY QUESTIONNAIRES
7. FEELING AFRAID AS IF SOMETHING AWFUL MIGHT HAPPEN: NOT AT ALL
5. BEING SO RESTLESS THAT IT IS HARD TO SIT STILL: NOT AT ALL
2. NOT BEING ABLE TO STOP OR CONTROL WORRYING: NOT AT ALL
6. BECOMING EASILY ANNOYED OR IRRITABLE: NOT AT ALL
1. FEELING NERVOUS, ANXIOUS, OR ON EDGE: NOT AT ALL
3. WORRYING TOO MUCH ABOUT DIFFERENT THINGS: NOT AT ALL
GAD7 TOTAL SCORE: 0
IF YOU CHECKED OFF ANY PROBLEMS ON THIS QUESTIONNAIRE, HOW DIFFICULT HAVE THESE PROBLEMS MADE IT FOR YOU TO DO YOUR WORK, TAKE CARE OF THINGS AT HOME, OR GET ALONG WITH OTHER PEOPLE: NOT DIFFICULT AT ALL

## 2021-12-14 ASSESSMENT — ENCOUNTER SYMPTOMS
ARTHRALGIAS: 1
GASTROINTESTINAL NEGATIVE: 1
PSYCHIATRIC NEGATIVE: 1
JOINT SWELLING: 0
CONSTITUTIONAL NEGATIVE: 1
EYES NEGATIVE: 1
ALLERGIC/IMMUNOLOGIC NEGATIVE: 1
RESPIRATORY NEGATIVE: 1
CARDIOVASCULAR NEGATIVE: 1
ENDOCRINE NEGATIVE: 1
HEMATOLOGIC/LYMPHATIC NEGATIVE: 1
NEUROLOGICAL NEGATIVE: 1

## 2021-12-14 ASSESSMENT — PATIENT HEALTH QUESTIONNAIRE - PHQ9
5. POOR APPETITE OR OVEREATING: NOT AT ALL
SUM OF ALL RESPONSES TO PHQ QUESTIONS 1-9: 3

## 2021-12-14 NOTE — PROGRESS NOTES
Assessment & Plan     Chronic pain of right knee  X-rays appear normal. Likely a soft tissue injury. Recommend seeing sports medicine for possibility of cortisone shot.  - XR Knee Right 3 Views; Future      FUTURE APPOINTMENTS:       - Follow-up visit in one month    Return in about 4 weeks (around 1/11/2022) for Follow up.    Dwight Ruvalcaba MD  [facility] [place] is a 56 year old who presents for the following health issues  accompanied by his self.    HPI Patient is a 56-year-old male presenting to the clinic for chronic right knee pain.  He states that he has had pain in the right knee over the last couple of years but lately pain has been worse.  About a month and a half ago he says he was standing and made a twisting movement and heard a pop in the knee.  Since then, standing for more than 10 to 15 minutes causes swelling at the back of the knee.  The knee also feels somewhat unstable.  He has not had any falls or trauma lately to the knee.  The knee pain is localized on the lateral outer knee.  He has not observed any swelling or warmth.  He denies any stiffness.  Pain is not getting worse neither is getting better.  He has not had any imaging done on the knee in the past that he remembers.    Concern - Knee pain  Onset: 1 1/2 months ago.  Description: Right knee pain, outer lower area of the knee cap.  If he stands for 10-15 minutes he will have pressure on the back of the knee with more pain.  He was standing up for awhile and had a slight turn, foot didn't turn and heard something pop in the knee.  Intensity: moderate with times of severe.   Progression of Symptoms:  Same, will feel better in the am but more painful again as the day goes on.  Accompanying Signs & Symptoms: Grinding or clicking sound initially when going up the stairs.  Doesn't seem to be doing that anymore.  No numbness or tingling. Slight swelling.  Previous history of similar problem:  "History of knee pain for years that will come and go.  This is different.  Precipitating factors:        Worsened by: If going backwards,   Alleviating factors:        Improved by: Rest.  Therapies tried and outcome: Ibuprofen as needed, rest.         Review of Systems   Constitutional: Negative.    HENT: Negative.    Eyes: Negative.    Respiratory: Negative.    Cardiovascular: Negative.    Gastrointestinal: Negative.    Endocrine: Negative.    Musculoskeletal: Positive for arthralgias. Negative for joint swelling.   Skin: Negative.    Allergic/Immunologic: Negative.    Neurological: Negative.    Hematological: Negative.    Psychiatric/Behavioral: Negative.             Objective    /80   Pulse 83   Temp 97.5  F (36.4  C) (Tympanic)   Resp 18   Ht 1.88 m (6' 2\")   Wt 112.9 kg (249 lb)   SpO2 97%   BMI 31.97 kg/m    Body mass index is 31.97 kg/m .  Physical Exam  Constitutional:       Appearance: Normal appearance.   HENT:      Head: Normocephalic and atraumatic.   Cardiovascular:      Pulses: Normal pulses.   Musculoskeletal:         General: Tenderness present.      Right knee: Decreased range of motion. Tenderness present.   Skin:     General: Skin is warm and dry.   Neurological:      General: No focal deficit present.      Mental Status: He is alert and oriented to person, place, and time.            X-ray knee  Appear normal,await radiology report.          "

## 2021-12-14 NOTE — PATIENT INSTRUCTIONS
Thank you for choosing [facility].  You may be receiving an email and/or telephone survey request from [facility] [employer] regarding your visit today.  Please take a few minutes to respond to the survey to let us know how we are doing.      If you have questions or concerns, please contact us via [employer] or you can contact your care team at [phone] option [phone].    Our Clinic hours are:  Monday - Thursday 7am-6pm  Friday 7am-5pm    The [place] outpatient lab hours are:  Monday - Friday 7am-4:30pm    Appointments are required, call [phone]    If you have clinical questions after hours or would like to schedule an appointment,  call the clinic at [phone].

## 2021-12-15 ASSESSMENT — ANXIETY QUESTIONNAIRES: GAD7 TOTAL SCORE: 0

## 2021-12-22 ENCOUNTER — TRANSFERRED RECORDS (OUTPATIENT)
Dept: HEALTH INFORMATION MANAGEMENT | Facility: CLINIC | Age: 56
End: 2021-12-22
Payer: COMMERCIAL

## 2022-01-11 ENCOUNTER — TRANSFERRED RECORDS (OUTPATIENT)
Dept: HEALTH INFORMATION MANAGEMENT | Facility: CLINIC | Age: 57
End: 2022-01-11
Payer: COMMERCIAL

## 2022-01-13 ENCOUNTER — TRANSCRIBE ORDERS (OUTPATIENT)
Dept: OTHER | Age: 57
End: 2022-01-13
Payer: COMMERCIAL

## 2022-01-13 ENCOUNTER — HOSPITAL ENCOUNTER (OUTPATIENT)
Dept: PHYSICAL THERAPY | Facility: CLINIC | Age: 57
Setting detail: THERAPIES SERIES
End: 2022-01-13
Attending: ORTHOPAEDIC SURGERY
Payer: COMMERCIAL

## 2022-01-13 DIAGNOSIS — M23.300 DEGENERATIVE TEAR OF LATERAL MENISCUS OF RIGHT KNEE: ICD-10-CM

## 2022-01-13 DIAGNOSIS — M23.203 DEGENERATIVE TEAR OF MEDIAL MENISCUS OF RIGHT KNEE: ICD-10-CM

## 2022-01-13 DIAGNOSIS — M17.11 RIGHT KNEE DJD: ICD-10-CM

## 2022-01-13 DIAGNOSIS — M22.2X1 PATELLOFEMORAL PAIN SYNDROME OF RIGHT KNEE: Primary | ICD-10-CM

## 2022-01-13 PROCEDURE — 97161 PT EVAL LOW COMPLEX 20 MIN: CPT | Mod: GP | Performed by: PHYSICAL THERAPIST

## 2022-01-13 PROCEDURE — 97110 THERAPEUTIC EXERCISES: CPT | Mod: GP | Performed by: PHYSICAL THERAPIST

## 2022-01-13 NOTE — PROGRESS NOTES
01/13/22 1100   General Information   Type of Visit Initial OP Ortho PT Evaluation   Start of Care Date 01/13/22   Referring Physician Comfort   Patient/Family Goals Statement regain strength   Orders Evaluate and Treat   Date of Order 12/30/21   Certification Required? No   Medical Diagnosis R knee pain, meniscus tear   Surgical/Medical history reviewed Yes   Precautions/Limitations no known precautions/limitations   Body Part(s)   Body Part(s) Knee   Presentation and Etiology   Pertinent history of current problem (include personal factors and/or comorbidities that impact the POC) hx of knee pain for a couple months.  not a lot of pain but weak.  no bad catching.  weak and unstable a bit.  hard to kneel.     Impairments A. Pain;B. Decreased WB tolerance;D. Decreased ROM;E. Decreased flexibility;F. Decreased strength and endurance;H. Impaired gait;M. Locking or catching   Functional Limitations perform activities of daily living;perform desired leisure / sports activities;perform required work activities   Symptom Location ant R knee   How/Where did it occur From insidious onset   Onset date of current episode/exacerbation 11/03/21   Chronicity New   Pain rating (0-10 point scale) Best (/10);Worst (/10)   Best (/10) 0   Worst (/10) 6   Pain quality B. Dull;C. Aching   Pain/symptoms are: The same all the time   Pain/symptoms exacerbated by E. Rest;C. Lifting;B. Walking;I. Bending   Pain/symptoms eased by C. Rest;E. Changing positions   Progression of symptoms since onset: Unchanged   Fall Risk Screen   Have you fallen 2 or more times in the past year? No   Have you fallen and had an injury in the past year? No   Is patient a fall risk? No   Abuse Screen (yes response referral indicated)   Feels Unsafe at Home or Work/School no   Feels Threatened by Someone no   Does Anyone Try to Keep You From Having Contact with Others or Doing Things Outside Your Home? no   Knee Objective Findings   Side (if bilateral, select  "both right and left) Right;Left   Gait/Locomotion R heel whip   Foot Position In Standing normal   Palpation tender med patellar facet, med/lat jt line,    Accessory Motion/Joint Mobility - sup to sit, mod stiff R piriformis   Right Knee Extension AROM o   Right Knee Flexion AROM 135 posterior pressure   Right Knee Flexion Strength 4   Right Knee Extension Strength 4   Right Hip Abduction Strength 4   Right Quad Set Strength fair, control at 3\" step down, no pain   R VMO Strength fair tone   Right Gastrocnemius Flexibility 20 deg   Right Hamstring Flexibility 90   Right Quadricep Flexibility 120   Planned Therapy Interventions   Planned Therapy Interventions ROM;strengthening;stretching;neuromuscular re-education;manual therapy;joint mobilization   Clinical Impression   Criteria for Skilled Therapeutic Interventions Met yes, treatment indicated   PT Diagnosis R knee pain   Influenced by the following impairments pain, weakness   Functional limitations due to impairments kneeling, skiing, stairs   Clinical Presentation Stable/Uncomplicated   Clinical Presentation Rationale OA   Clinical Decision Making (Complexity) Low complexity   Therapy Frequency 1 time/week   Predicted Duration of Therapy Intervention (days/wks) 8wk   Risk & Benefits of therapy have been explained Yes   Patient, Family & other staff in agreement with plan of care Yes   Education Assessment   Preferred Learning Style Demonstration   Barriers to Learning No barriers   ORTHO GOALS   PT Ortho Eval Goals 1;2;3   Ortho Goal 1   Goal Identifier 1   Goal Description pt will be able to squat to floor   Target Date 02/24/22   Ortho Goal 2   Goal Identifier 2   Goal Description pt will be able to descend stairs without pain   Target Date 03/10/22   Total Evaluation Time   PT Marina Low Complexity Minutes (88923) 30     "

## 2022-05-15 ENCOUNTER — HEALTH MAINTENANCE LETTER (OUTPATIENT)
Age: 57
End: 2022-05-15

## 2022-08-08 ENCOUNTER — OFFICE VISIT (OUTPATIENT)
Dept: FAMILY MEDICINE | Facility: CLINIC | Age: 57
End: 2022-08-08
Payer: COMMERCIAL

## 2022-08-08 VITALS
HEIGHT: 74 IN | RESPIRATION RATE: 16 BRPM | DIASTOLIC BLOOD PRESSURE: 80 MMHG | WEIGHT: 247 LBS | TEMPERATURE: 97.9 F | SYSTOLIC BLOOD PRESSURE: 106 MMHG | HEART RATE: 77 BPM | BODY MASS INDEX: 31.7 KG/M2 | OXYGEN SATURATION: 97 %

## 2022-08-08 DIAGNOSIS — M25.572 PAIN IN JOINT INVOLVING ANKLE AND FOOT, LEFT: Primary | ICD-10-CM

## 2022-08-08 PROCEDURE — 99213 OFFICE O/P EST LOW 20 MIN: CPT | Performed by: FAMILY MEDICINE

## 2022-08-08 ASSESSMENT — PATIENT HEALTH QUESTIONNAIRE - PHQ9
10. IF YOU CHECKED OFF ANY PROBLEMS, HOW DIFFICULT HAVE THESE PROBLEMS MADE IT FOR YOU TO DO YOUR WORK, TAKE CARE OF THINGS AT HOME, OR GET ALONG WITH OTHER PEOPLE: SOMEWHAT DIFFICULT
SUM OF ALL RESPONSES TO PHQ QUESTIONS 1-9: 6
SUM OF ALL RESPONSES TO PHQ QUESTIONS 1-9: 6

## 2022-08-08 ASSESSMENT — PAIN SCALES - GENERAL: PAINLEVEL: MODERATE PAIN (5)

## 2022-08-08 NOTE — PATIENT INSTRUCTIONS
Relative rest, ice, elevation.     Try a compression stocking for discomfort/ swelling.     Xray today.     Schedule with physical therapy.     If not improving or wish to see Podiatrist let me know.

## 2022-08-08 NOTE — PROGRESS NOTES
Assessment & Plan     Pain in joint involving ankle and foot, left  Pain over the medial malleoli. Will obtain xray to rule out fracture as had trauma in the past. Discussed conservative cares. Will provide with physical therapy referral. Obtain xray today. He will reach out via [email or web address] if symptoms persist/ not improve with conservative cares and physical therapy.   - XR Ankle Left G/E 3 Views  - Physical Therapy Referral    The risks, benefits and treatment options of prescribed medications or other treatments have been discussed with the patient. The patient verbalized their understanding and should call or follow up if no improvement or if they develop further problems.      Patrice Cardensa DO  [facility] [place] is a 57 year old, presenting for the following health issues:  Ankle Problem      History of Present Illness       Reason for visit:  Ankle injury  Symptom onset:  More than a month  Symptoms include:  Swollen ankle, stinging pain when walking  Symptom intensity:  Severe  Symptom progression:  Worsening  Had these symptoms before:  Yes  Has tried/received treatment for these symptoms:  No  What makes it worse:  Staying off my standing for extended period of time  What makes it better:  Elevating my feet    He eats 0-1 servings of fruits and vegetables daily.He consumes 1 sweetened beverage(s) daily.He exercises with enough effort to increase his heart rate 10 to 19 minutes per day.  He exercises with enough effort to increase his heart rate 3 or less days per week.   He is taking medications regularly.    Today's PHQ-9         PHQ-9 Total Score: 6    PHQ-9 Q9 Thoughts of better off dead/self-harm past 2 weeks :   Not at all    How difficult have these problems made it for you to do your work, take care of things at home, or get along with other people: Somewhat difficult     57-year-old male who presents to clinic for evaluation of ankle pain.    Bumped left foot  "in May, had bruising and swelling. Seemed to improve     Stinging pain on the inside of the ankle.   Pain is more constant.   No pain at rest. Pain with walking and also standing.   Pain improved by putting feet up.   Pain is better in the morning, worse throughout the day.   It has not been red or hot.   Has tried some ibuprofen, ice will help as well.   No numbness or tingling.   Has tried an ankle brace but did not significantly help.       Review of Systems   Constitutional, HEENT, cardiovascular, pulmonary, gi and gu systems are negative, except as otherwise noted.      Objective    /80 (BP Location: Left arm, Patient Position: Chair, Cuff Size: Adult Large)   Pulse 77   Temp 97.9  F (36.6  C) (Tympanic)   Resp 16   Ht 1.873 m (6' 1.75\")   Wt 112 kg (247 lb)   SpO2 97%   BMI 31.93 kg/m    Body mass index is 31.93 kg/m .  Physical Exam   General: alert, cooperative, no acute distress.   MSK left ankle. Mild swelling over medial malleoli. No significant ecchymosis. Tender to palpation over the posterior aspect of medial malleoli. Non-tender over the lateral malleoli. Non-tender over fibula head and base of fifth metatarsal.   Sensation intact. Gait normal.     .  ..  "

## 2022-09-10 ENCOUNTER — HEALTH MAINTENANCE LETTER (OUTPATIENT)
Age: 57
End: 2022-09-10

## 2022-09-12 ENCOUNTER — HOSPITAL ENCOUNTER (OUTPATIENT)
Dept: PHYSICAL THERAPY | Facility: CLINIC | Age: 57
Setting detail: THERAPIES SERIES
Discharge: HOME | End: 2022-09-12
Attending: FAMILY MEDICINE
Payer: COMMERCIAL

## 2022-09-12 PROCEDURE — 97161 PT EVAL LOW COMPLEX 20 MIN: CPT | Mod: GP | Performed by: PHYSICAL THERAPIST

## 2022-09-12 PROCEDURE — 97110 THERAPEUTIC EXERCISES: CPT | Mod: GP | Performed by: PHYSICAL THERAPIST

## 2022-09-12 PROCEDURE — 97112 NEUROMUSCULAR REEDUCATION: CPT | Mod: GP | Performed by: PHYSICAL THERAPIST

## 2022-09-12 NOTE — PROGRESS NOTES
09/12/22 0700   General Information   Type of Visit Initial OP Ortho PT Evaluation   Start of Care Date 09/12/22   Referring Physician Dr Cardenas   Patient/Family Goals Statement decrease ankle pain   Orders Evaluate and Treat   Date of Order 08/29/22   Certification Required? No   Medical Diagnosis L ankle pain   Surgical/Medical history reviewed Yes   Precautions/Limitations no known precautions/limitations   Body Part(s)   Body Part(s) Ankle/Foot   Presentation and Etiology   Pertinent history of current problem (include personal factors and/or comorbidities that impact the POC) L ankle sore since dropping something on the foot.  has a brace that does not do much.  Pain at the medial ankle near the navicular.  pain with increased activity.   Impairments A. Pain;B. Decreased WB tolerance;C. Swelling;D. Decreased ROM;E. Decreased flexibility;F. Decreased strength and endurance;H. Impaired gait;I. Impaired skin integrity;L. Tingling;K. Numbness;M. Locking or catching   Functional Limitations perform activities of daily living;perform desired leisure / sports activities;perform required work activities   How/Where did it occur With repetition/overuse;During contact with an object   Onset date of current episode/exacerbation 04/14/22   Chronicity Chronic   Pain rating (0-10 point scale) Best (/10);Worst (/10)   Best (/10) 3   Worst (/10) 10   Pain quality A. Sharp;C. Aching;D. Burning;E. Shooting;F. Stabbing   Frequency of pain/symptoms A. Constant   Pain/symptoms are: Worse during the night   Pain/symptoms exacerbated by B. Walking;G. Certain positions;K. Home tasks;L. Work tasks;J. ADL   Pain/symptoms eased by A. Sitting;C. Rest;F. Certain positions;J. Braces/supports   Progression of symptoms since onset: Improved   Fall Risk Screen   Have you fallen 2 or more times in the past year? No   Have you fallen and had an injury in the past year? No   Is patient a fall risk? No   Abuse Screen (yes response referral  indicated)   Feels Unsafe at Home or Work/School no   Feels Threatened by Someone no   Does Anyone Try to Keep You From Having Contact with Others or Doing Things Outside Your Home? no   Ankle/Foot Objective Findings   Longitudinal Arch Angle Test +   Posterior Drawer Test stiff   Accessory Motion/Joint Mobility poor single leg stance L   Ankle/Foot Flexibility Comments loose midfoot L>R   Side (if bilateral, select both right and left) Left   Ankle/Foot ROM Comment 5   Palpation tender post tib tendon, delt lig, sust michelle, TN jt line   Gait/Locomotion L heel whip   Foot Position In Standing L pes planus   Left Gastroc (in WB) Flexibility 20   Left Soleus (in WB) Flexibility 25   Left PF/Inversion Strength 5   Left PF/Eversion Strength 5   Left DF (Knee Ext) AROM 12   Left PF AROM 50   Left Calcanceal Inversion AROM 35   Left Calcaneal Eversion AROM 20   Left DF/Inversion Strength 4 pain   Left DF/Eversion Strength 5   Planned Therapy Interventions   Planned Therapy Interventions ROM;strengthening;stretching;neuromuscular re-education;joint mobilization;balance training   Planned Therapy Interventions Comment inserts?   Clinical Impression   Criteria for Skilled Therapeutic Interventions Met yes, treatment indicated   PT Diagnosis ankle pain   Influenced by the following impairments pain, stiffness, laxity   Functional limitations due to impairments walking, jogging, standing   Clinical Presentation Stable/Uncomplicated   Clinical Presentation Rationale 4 months of pain   Clinical Decision Making (Complexity) Low complexity   Therapy Frequency 1 time/week   Predicted Duration of Therapy Intervention (days/wks) 6wk   Risk & Benefits of therapy have been explained Yes   Patient, Family & other staff in agreement with plan of care Yes   Clinical Impression Comments appears to have stiff TC jt adding stress to his midfoot that is evident in gait and descending stairs   Education Assessment   Preferred Learning Style  Demonstration   Barriers to Learning No barriers   ORTHO GOALS   PT Ortho Eval Goals 1;2;3   Ortho Goal 1   Goal Identifier 1   Goal Description pt will be able to walk in the morning without pain   Target Date 10/03/22   Ortho Goal 2   Goal Identifier 2   Goal Description pt will be able to walk 30 min for community amb without ankle pain   Target Date 10/17/22   Ortho Goal 3   Goal Identifier 3   Goal Description pt will be able to sit to  evening without pain   Target Date 10/24/22   Total Evaluation Time   PT Davinaal, Low Complexity Minutes (00052) 25

## 2022-09-15 ENCOUNTER — TRANSFERRED RECORDS (OUTPATIENT)
Dept: FAMILY MEDICINE | Facility: CLINIC | Age: 57
End: 2022-09-15

## 2022-10-03 ENCOUNTER — HOSPITAL ENCOUNTER (OUTPATIENT)
Dept: PHYSICAL THERAPY | Facility: CLINIC | Age: 57
Setting detail: THERAPIES SERIES
Discharge: HOME | End: 2022-10-03
Attending: FAMILY MEDICINE
Payer: COMMERCIAL

## 2022-10-03 PROCEDURE — 97140 MANUAL THERAPY 1/> REGIONS: CPT | Mod: GP | Performed by: PHYSICAL THERAPIST

## 2022-10-03 PROCEDURE — 97110 THERAPEUTIC EXERCISES: CPT | Mod: GP | Performed by: PHYSICAL THERAPIST

## 2022-11-02 ENCOUNTER — VIRTUAL VISIT (OUTPATIENT)
Dept: FAMILY MEDICINE | Facility: OTHER | Age: 57
End: 2022-11-02
Payer: COMMERCIAL

## 2022-11-02 DIAGNOSIS — J20.9 ACUTE BRONCHITIS WITH SYMPTOMS GREATER THAN 10 DAYS: Primary | ICD-10-CM

## 2022-11-02 PROCEDURE — 99213 OFFICE O/P EST LOW 20 MIN: CPT | Mod: 95 | Performed by: PHYSICIAN ASSISTANT

## 2022-11-02 RX ORDER — BENZONATATE 100 MG/1
100 CAPSULE ORAL 3 TIMES DAILY PRN
Qty: 30 CAPSULE | Refills: 0 | Status: SHIPPED | OUTPATIENT
Start: 2022-11-02 | End: 2023-01-31

## 2022-11-02 RX ORDER — DOXYCYCLINE 100 MG/1
100 CAPSULE ORAL 2 TIMES DAILY
Qty: 14 CAPSULE | Refills: 0 | Status: SHIPPED | OUTPATIENT
Start: 2022-11-02 | End: 2022-11-09

## 2022-11-02 ASSESSMENT — PATIENT HEALTH QUESTIONNAIRE - PHQ9
SUM OF ALL RESPONSES TO PHQ QUESTIONS 1-9: 4
10. IF YOU CHECKED OFF ANY PROBLEMS, HOW DIFFICULT HAVE THESE PROBLEMS MADE IT FOR YOU TO DO YOUR WORK, TAKE CARE OF THINGS AT HOME, OR GET ALONG WITH OTHER PEOPLE: SOMEWHAT DIFFICULT
SUM OF ALL RESPONSES TO PHQ QUESTIONS 1-9: 4

## 2022-11-02 NOTE — PROGRESS NOTES
Karthik is a 57 year old who is being evaluated via a billable video visit.      How would you like to obtain your AVS? MyChart  If the video visit is dropped, the invitation should be resent by: Text to cell phone: [phone]  Will anyone else be joining your video visit? No    Assessment & Plan     Acute bronchitis with symptoms greater than 10 days  Symptoms have been prolonged without improvement and can get worse through the night.  We discussed COVID and mono as possibilities based on symptoms, at this point wouldn't require COVID testing given how long he has had symptoms.  Less concerned for influenza given the lack of fevers and most certainly can be any other viral illness.  Given the length of symptoms we elected to start on a round of antibiotics and supportive treatment with OTC medications.    - benzonatate (TESSALON) 100 MG capsule; Take 1 capsule (100 mg) by mouth 3 times daily as needed for cough  - doxycycline monohydrate (MONODOX) 100 MG capsule; Take 1 capsule (100 mg) by mouth 2 times daily for 7 days      Return in about 5 days (around 11/7/2022) for If not improving, sooner if worse or new concerns.    Options for treatment and follow-up care were reviewed with the patient and/or guardian. Patient and/or guardian engaged in the decision making process and verbalized understanding of the options discussed and agreed with the final plan.    [facility]   Karthik is a 57 year old, presenting for the following health issues:  No chief complaint on file.      HPI     Acute Illness  Acute illness concerns: bodyaches, chills, cough worse at night.   Onset/Duration: a little over a week.  Son had similar symptoms, he had them for a week and then improved.    Symptoms:  Fever: No  Chills/Sweats: YES  Headache (location?): YES  Sinus Pressure: YES- but during the day they are more mild and they are worse up to 10/10.  Conjunctivitis:  No  Ear  Pain: no  Rhinorrhea: YES  Congestion: YES   Sore Throat: YES- dry throat, feels like there is something in the throat.    Cough: YES - it is dry, worse at night, can wake him up at night.  Has some raspy breathing at times.  No chest pressure.   Wheeze: No  Decreased Appetite: YES- reduced appetite, taste is is normal.    Nausea: No  Vomiting: No  Diarrhea: YES- no increased frequency.   Fatigue/Achiness: YES- fatigue and achiness. Fatigue is very consistent.   Sick/Strep Exposure: YES- Son has similar symptoms.   Therapies tried and outcome: cough drops, fluids    Home COVID test couple of times and were negative.     Review of Systems   Constitutional, HEENT, cardiovascular, pulmonary, gi and gu systems are negative, except as otherwise noted.      Objective           Vitals:  No vitals were obtained today due to virtual visit.    Physical Exam   GENERAL: alert and no distress  EYES: Eyes grossly normal to inspection.  No discharge or erythema, or obvious scleral/conjunctival abnormalities.  RESP: No audible wheeze, cough, or visible cyanosis.  No visible retractions or increased work of breathing.    SKIN: Visible skin clear. No significant rash, abnormal pigmentation or lesions.  NEURO: Cranial nerves grossly intact.  Mentation and speech appropriate for age.  PSYCH: Mentation appears normal, affect normal/bright, judgement and insight intact, normal speech and appearance well-groomed.            Video-Visit Details    Video Start Time: 3:00 PM    Type of service:  Video Visit    Video End Time:3:13 PM    Originating Location (pt. Location): Home        Distant Location (provider location):  Off-site    Platform used for Video Visit: Jovon

## 2022-11-02 NOTE — PATIENT INSTRUCTIONS
"Nasal Saline: At the pharmacy you can find a \"Nettipot\" or NeilMed Nasal Rinse.  This is a salt solution that you mix with warm water.  I want you to perform nasal saline washes at least twice daily while you are sick.  You can do this anytime you feel like you are developing nasal congestion.  To properly utilize be sure you are leaning forward as far as you can and either tilt or squeeze slowly.  In the beginning this can be difficult to get to work properly but as the congestion is improved it will work easier.  If you don't use these properly you can feel as if you're drowning.     Nasal Steroid: Fluticasone/Flonase or Nasacort, These are OTC medications for allergies.  They are steroid sprays that are localized to the nose so no systemic effects. Two sprays each nostril once daily - allergist noted it is most effective if used before bed.  Ok to continue to use nasal saline as well.  When you spray it in the nose it should be up and out towards the eye on the side you are spraying the medication.  You should not taste the medication and it should not drip out the nose. This will decrease inflammation and also nasal mucous production.  You can also use this anytime you are developing nasal congestion.  Ok in the future to start these as soon as you feel you are developing nasal congestion, sinus pressure, increased nasal drainage.    If no blood pressure issues recommend Mucinex DM Max, if issues with your blood pressure ok to just use plain mucinex product.      Follow-up if symptoms worsen or do not resolve.  Feel free to call with any questions or concerns.      " The location was identified, and the area was draped and prepped./The catheter was placed using sterile technique.

## 2023-01-19 ENCOUNTER — TRANSFERRED RECORDS (OUTPATIENT)
Dept: HEALTH INFORMATION MANAGEMENT | Facility: CLINIC | Age: 58
End: 2023-01-19
Payer: COMMERCIAL

## 2023-01-31 ENCOUNTER — OFFICE VISIT (OUTPATIENT)
Dept: FAMILY MEDICINE | Facility: CLINIC | Age: 58
End: 2023-01-31
Payer: COMMERCIAL

## 2023-01-31 VITALS
WEIGHT: 250.2 LBS | HEIGHT: 74 IN | TEMPERATURE: 97.8 F | RESPIRATION RATE: 14 BRPM | DIASTOLIC BLOOD PRESSURE: 76 MMHG | SYSTOLIC BLOOD PRESSURE: 112 MMHG | BODY MASS INDEX: 32.11 KG/M2 | HEART RATE: 92 BPM | OXYGEN SATURATION: 96 %

## 2023-01-31 DIAGNOSIS — Z01.818 PREOP GENERAL PHYSICAL EXAM: Primary | ICD-10-CM

## 2023-01-31 DIAGNOSIS — F33.1 MODERATE EPISODE OF RECURRENT MAJOR DEPRESSIVE DISORDER (H): ICD-10-CM

## 2023-01-31 DIAGNOSIS — S83.281D TEAR OF LATERAL MENISCUS OF RIGHT KNEE, CURRENT, UNSPECIFIED TEAR TYPE, SUBSEQUENT ENCOUNTER: ICD-10-CM

## 2023-01-31 PROCEDURE — 90682 RIV4 VACC RECOMBINANT DNA IM: CPT | Performed by: NURSE PRACTITIONER

## 2023-01-31 PROCEDURE — 90471 IMMUNIZATION ADMIN: CPT | Performed by: NURSE PRACTITIONER

## 2023-01-31 PROCEDURE — 99214 OFFICE O/P EST MOD 30 MIN: CPT | Mod: 25 | Performed by: NURSE PRACTITIONER

## 2023-01-31 ASSESSMENT — PAIN SCALES - GENERAL: PAINLEVEL: MODERATE PAIN (5)

## 2023-01-31 NOTE — PROGRESS NOTES
[facility]  [address] [address]  Phone: [phone]  Primary Provider: No Ref-Primary, Physician  Pre-op Performing Provider: CUCA NATARAJAN    PREOPERATIVE EVALUATION:  Today's date: 1/31/2023    Karthik Bird is a 57 year old male who presents for a preoperative evaluation.    Surgical Information:  Surgery/Procedure: right knee arthroscopy  Surgery Location: [facility]  Surgeon: dr webb  Surgery Date: 02/07/2023  Time of Surgery: 730 am  Where patient plans to recover: At home with family  Fax number for surgical facility: [phone]    Type of Anesthesia Anticipated: General    Assessment & Plan     The proposed surgical procedure is considered INTERMEDIATE risk.    Preop general physical exam  No labs indicated due to healthy patient.  No heart history or symptoms so EKG is not indicated as well.    Tear of lateral meniscus of right knee, current, unspecified tear type, subsequent encounter  Stable.    Moderate episode of recurrent major depressive disorder (H)  Stable off medication.    Risks and Recommendations:  The patient has the following additional risks and recommendations for perioperative complications:   - No identified additional risk factors other than previously addressed    Medication Instructions:  Hold medications on the day of surgery.    RECOMMENDATION:  APPROVAL GIVEN to proceed with proposed procedure, without further diagnostic evaluation.    Subjective     HPI related to upcoming procedure: Patient was stopping and twisted his knee and heard a pop of the medial meniscus.    Preop Questions 1/31/2023   1. Have you ever had a heart attack or stroke? No   2. Have you ever had surgery on your heart or blood vessels, such as a stent placement, a coronary artery bypass, or surgery on an artery in your head, neck, heart, or legs? No   3. Do you have chest pain with activity? No   4. Do you have a history of  heart  failure? No   5. Do you currently have a cold, bronchitis or symptoms of other infection? No   6. Do you have a cough, shortness of breath, or wheezing? No   7. Do you or anyone in your family have previous history of blood clots? No   8. Do you or does anyone in your family have a serious bleeding problem such as prolonged bleeding following surgeries or cuts? No   9. Have you ever had problems with anemia or been told to take iron pills? No   10. Have you had any abnormal blood loss such as black, tarry or bloody stools? No   11. Have you ever had a blood transfusion? No   12. Are you willing to have a blood transfusion if it is medically needed before, during, or after your surgery? Yes   13. Have you or any of your relatives ever had problems with anesthesia? No   14. Do you have sleep apnea, excessive snoring or daytime drowsiness? No   15. Do you have any artifical heart valves or other implanted medical devices like a pacemaker, defibrillator, or continuous glucose monitor? No   16. Do you have artificial joints? No   17. Are you allergic to latex? No       Health Care Directive:  Patient does not have a Health Care Directive or Living Will: Discussed advance care planning with patient; however, patient declined at this time.    Preoperative Review of :   reviewed - no record of controlled substances prescribed.    Status of Chronic Conditions:  DEPRESSION - Patient has a long history of Depression of mild severity not requiring medication for control with recent symptoms being stable. Current symptoms of depression include none.       Review of Systems  CONSTITUTIONAL: NEGATIVE for fever, chills, change in weight  INTEGUMENTARY/SKIN: NEGATIVE for worrisome rashes, moles or lesions  EYES: NEGATIVE for vision changes or irritation  ENT/MOUTH: POSITIVE for tinnitus bilateral  RESP: NEGATIVE for significant cough or SOB  CV: NEGATIVE for chest pain, palpitations or peripheral edema  GI: NEGATIVE for  nausea, abdominal pain, heartburn, or change in bowel habits  : NEGATIVE for frequency, dysuria, or hematuria  MUSCULOSKELETAL:POSITIVE  for right lateral knee pain  NEURO: NEGATIVE for weakness, dizziness or paresthesias  ENDOCRINE: NEGATIVE for temperature intolerance, skin/hair changes  HEME: NEGATIVE for bleeding problems  PSYCHIATRIC: NEGATIVE for changes in mood or affect    Patient Active Problem List    Diagnosis Date Noted     Pain in joint involving ankle and foot, left 08/08/2022     Priority: Medium     Moderate episode of recurrent major depressive disorder (H) 08/01/2021     Priority: Medium     Cervical radiculopathy 05/17/2019     Priority: Medium     Cervical stenosis of spinal canal 05/07/2019     Priority: Medium     H/O rotator cuff tear 09/20/2016     Priority: Medium     ED (erectile dysfunction) 07/22/2015     Priority: Medium     Obesity (BMI 30.0-34.9) 06/23/2015     Priority: Medium     CARDIOVASCULAR SCREENING; LDL GOAL LESS THAN 160 03/03/2015     Priority: Medium     Colon polyp 02/06/2015     Priority: Medium     Tubular adenoma polyp with mild villous features. Considered benign. Recommend to return in 5 years return for colonoscopy       Chronic migraine without aura, not intractable 05/28/2014     Priority: Medium      Past Medical History:   Diagnosis Date     Migraine      Seasonal allergies      Past Surgical History:   Procedure Laterality Date     APPENDECTOMY      at 14 yo     COLONOSCOPY       COLONOSCOPY N/A 4/8/2021    Procedure: COLONOSCOPY;  Surgeon: Ruben Salmeron MD;  Location: [facility]     GENITOURINARY SURGERY      previous vasectomy 2/2014     VASECTOMY  5/30/2014    Procedure: VASECTOMY;  Surgeon: Zaida Slaughter MD;  Location: [facility]     Current Outpatient Medications   Medication Sig Dispense Refill     SUMAtriptan (IMITREX) 25 MG tablet Take 1-2 tablets (25-50 mg) by mouth at onset of headache May repeat in 1-2 hours after onset. 30 tablet 1      "benzonatate (TESSALON) 100 MG capsule Take 1 capsule (100 mg) by mouth 3 times daily as needed for cough (Patient not taking: Reported on 1/31/2023) 30 capsule 0       Allergies   Allergen Reactions     Nka [No Known Allergies]         Social History     Tobacco Use     Smoking status: Never     Smokeless tobacco: Never   Substance Use Topics     Alcohol use: Yes     Comment: twice a year.     Family History   Problem Relation Age of Onset     Cancer Mother         lung- uterine     Lung Cancer Mother      Substance Abuse Father      Liver Disease Father      Alzheimer Disease Maternal Grandmother         99     Pneumonia Maternal Grandfather         99     History   Drug Use No         Objective     /76   Pulse 92   Temp 97.8  F (36.6  C) (Tympanic)   Resp 14   Ht 1.873 m (6' 1.75\")   Wt 113.5 kg (250 lb 3.2 oz)   SpO2 96%   BMI 32.34 kg/m      Physical Exam    GENERAL APPEARANCE: healthy, alert and no distress     EYES: EOMI,  PERRL     HENT: ear canals and TM's normal and nose and mouth without ulcers or lesions     NECK: no adenopathy, no asymmetry, masses, or scars and thyroid normal to palpation     RESP: lungs clear to auscultation - no rales, rhonchi or wheezes     CV: regular rates and rhythm, normal S1 S2, no S3 or S4 and no murmur, click or rub     ABDOMEN:  soft, nontender, no HSM or masses and bowel sounds normal     MS: extremities normal- no gross deformities noted, no evidence of inflammation in joints, FROM in all extremities.  Mild weakness in extension of right knee     SKIN: no suspicious lesions or rashes     NEURO: Normal strength and tone, sensory exam grossly normal, mentation intact and speech normal     PSYCH: mentation appears normal. and affect normal/bright     LYMPHATICS: No cervical adenopathy    No results for input(s): HGB, PLT, INR, NA, POTASSIUM, CR, A1C in the last 70380 hours.     Diagnostics:  No labs were ordered during this visit.   No EKG required, no history of " coronary heart disease, significant arrhythmia, peripheral arterial disease or other structural heart disease.    Revised Cardiac Risk Index (RCRI):  The patient has the following serious cardiovascular risks for perioperative complications:   - No serious cardiac risks = 0 points     RCRI Interpretation: 0 points: Class I (very low risk - 0.4% complication rate)    Signed Electronically by: Sil Cherry NP  Copy of this evaluation report is provided to requesting physician.

## 2023-01-31 NOTE — PATIENT INSTRUCTIONS
For informational purposes only. Not to replace the advice of your health care provider. Copyright   2003,  [facility]. All rights reserved. Clinically reviewed by Angelita Starr MD. [employer] [id number] - REV .  Preparing for Your Surgery  Getting started  A nurse will call you to review your health history and instructions. They will give you an arrival time based on your scheduled surgery time. Please be ready to share:    Your doctor's clinic name and phone number    Your medical, surgical, and anesthesia history    A list of allergies and sensitivities    A list of medicines, including herbal treatments and over-the-counter drugs    Whether the patient has a legal guardian (ask how to send us the papers in advance)  Please tell us if you're pregnant--or if there's any chance you might be pregnant. Some surgeries may injure a fetus (unborn baby), so they require a pregnancy test. Surgeries that are safe for a fetus don't always need a test, and you can choose whether to have one.   If you have a child who's having surgery, please ask for a copy of Preparing for Your Child's Surgery.    Preparing for surgery    Within 10 to 30 days of surgery: Have a pre-op exam (sometimes called an H&P, or History and Physical). This can be done at a clinic or pre-operative center.  ? If you're having a , you may not need this exam. Talk to your care team.    At your pre-op exam, talk to your care team about all medicines you take. If you need to stop any medicines before surgery, ask when to start taking them again.  ? We do this for your safety. Many medicines can make you bleed too much during surgery. Some change how well surgery (anesthesia) drugs work.    Call your insurance company to let them know you're having surgery. (If you don't have insurance, call [phone].)    Call your clinic if there's any change in your health. This includes signs of a cold or flu (sore throat, runny nose,  cough, rash, fever). It also includes a scrape or scratch near the surgery site.    If you have questions on the day of surgery, call your hospital or surgery center.  Eating and drinking guidelines  For your safety: Unless your surgeon tells you otherwise, follow the guidelines below.    Eat and drink as usual until 8 hours before you arrive for surgery. After that, no food or milk.    Drink clear liquids until 2 hours before you arrive. These are liquids you can see through, like water, Gatorade, and Propel Water. They also include plain black coffee and tea (no cream or milk), candy, and breath mints. You can spit out gum when you arrive.    If you drink alcohol: Stop drinking it the night before surgery.    If your care team tells you to take medicine on the morning of surgery, it's okay to take it with a sip of water.  Preventing infection    Shower or bathe the night before and morning of your surgery. Follow the instructions your clinic gave you. (If no instructions, use regular soap.)    Don't shave or clip hair near your surgery site. We'll remove the hair if needed.    Don't smoke or vape the morning of surgery. You may chew nicotine gum up to 2 hours before surgery. A nicotine patch is okay.  ? Note: Some surgeries require you to completely quit smoking and nicotine. Check with your surgeon.    Your care team will make every effort to keep you safe from infection. We will:  ? Clean our hands often with soap and water (or an alcohol-based hand rub).  ? Clean the skin at your surgery site with a special soap that kills germs.  ? Give you a special gown to keep you warm. (Cold raises the risk of infection.)  ? Wear special hair covers, masks, gowns and gloves during surgery.  ? Give antibiotic medicine, if prescribed. Not all surgeries need antibiotics.  What to bring on the day of surgery    Photo ID and insurance card    Copy of your health care directive, if you have one    Glasses and hearing aids (bring  cases)  ? You can't wear contacts during surgery    Inhaler and eye drops, if you use them (tell us about these when you arrive)    CPAP machine or breathing device, if you use them    A few personal items, if spending the night    If you have . . .  ? A pacemaker, ICD (cardiac defibrillator) or other implant: Bring the ID card.  ? An implanted stimulator: Bring the remote control.  ? A legal guardian: Bring a copy of the certified (court-stamped) guardianship papers.  Please remove any jewelry, including body piercings. Leave jewelry and other valuables at home.  If you're going home the day of surgery    You must have a responsible adult drive you home. They should stay with you overnight as well.    If you don't have someone to stay with you, and you aren't safe to go home alone, we may keep you overnight. Insurance often won't pay for this.  After surgery  If it's hard to control your pain or you need more pain medicine, please call your surgeon's office.  Questions?   If you have any questions for your care team, list them here: _________________________________________________________________________________________________________________________________________________________________________ ____________________________________ ____________________________________ ____________________________________

## 2023-02-07 ENCOUNTER — TRANSFERRED RECORDS (OUTPATIENT)
Dept: HEALTH INFORMATION MANAGEMENT | Facility: CLINIC | Age: 58
End: 2023-02-07

## 2023-03-08 ENCOUNTER — TRANSFERRED RECORDS (OUTPATIENT)
Dept: HEALTH INFORMATION MANAGEMENT | Facility: CLINIC | Age: 58
End: 2023-03-08
Payer: COMMERCIAL

## 2023-03-22 ENCOUNTER — TRANSFERRED RECORDS (OUTPATIENT)
Dept: HEALTH INFORMATION MANAGEMENT | Facility: CLINIC | Age: 58
End: 2023-03-22
Payer: COMMERCIAL

## 2023-04-05 ENCOUNTER — TRANSFERRED RECORDS (OUTPATIENT)
Dept: HEALTH INFORMATION MANAGEMENT | Facility: CLINIC | Age: 58
End: 2023-04-05
Payer: COMMERCIAL

## 2023-04-05 ENCOUNTER — TRANSCRIBE ORDERS (OUTPATIENT)
Dept: OTHER | Age: 58
End: 2023-04-05

## 2023-04-05 DIAGNOSIS — S83.241A ACUTE MEDIAL MENISCUS TEAR OF RIGHT KNEE: Primary | ICD-10-CM

## 2023-04-05 DIAGNOSIS — M17.11 PRIMARY OSTEOARTHRITIS OF RIGHT KNEE: ICD-10-CM

## 2023-04-20 ENCOUNTER — HOSPITAL ENCOUNTER (OUTPATIENT)
Dept: PHYSICAL THERAPY | Facility: CLINIC | Age: 58
Setting detail: THERAPIES SERIES
Discharge: HOME | End: 2023-04-20
Attending: PHYSICIAN ASSISTANT
Payer: COMMERCIAL

## 2023-04-20 DIAGNOSIS — S83.241A ACUTE MEDIAL MENISCUS TEAR OF RIGHT KNEE: ICD-10-CM

## 2023-04-20 DIAGNOSIS — M17.11 PRIMARY OSTEOARTHRITIS OF RIGHT KNEE: ICD-10-CM

## 2023-04-20 PROCEDURE — 97110 THERAPEUTIC EXERCISES: CPT | Mod: GP | Performed by: PHYSICAL THERAPIST

## 2023-04-20 PROCEDURE — 97161 PT EVAL LOW COMPLEX 20 MIN: CPT | Mod: GP | Performed by: PHYSICAL THERAPIST

## 2023-05-01 ENCOUNTER — HOSPITAL ENCOUNTER (OUTPATIENT)
Dept: PHYSICAL THERAPY | Facility: CLINIC | Age: 58
Setting detail: THERAPIES SERIES
Discharge: HOME | End: 2023-05-01
Attending: PHYSICIAN ASSISTANT
Payer: COMMERCIAL

## 2023-05-01 PROCEDURE — 97110 THERAPEUTIC EXERCISES: CPT | Mod: GP | Performed by: PHYSICAL THERAPIST

## 2023-05-01 NOTE — PROGRESS NOTES
04/20/23 1500   General Information   Type of Visit Initial OP Ortho PT Evaluation   Start of Care Date 04/20/23   Referring Physician Grzegorz HORNER   Patient/Family Goals Statement decrease pain   Orders Evaluate and Treat   Date of Order 03/30/23   Certification Required? No   Medical Diagnosis knee pain   Surgical/Medical history reviewed Yes   Precautions/Limitations no known precautions/limitations   Body Part(s)   Body Part(s) Knee   Presentation and Etiology   Pertinent history of current problem (include personal factors and/or comorbidities that impact the POC) Had a knee scope.  did well.  but about 10 days after surgery the lateral surgical scar did not hurt then swelled up.  trying to stay active.  the spot is not painful.  kneeling and stairs are sore.   Impairments A. Pain;B. Decreased WB tolerance;C. Swelling;D. Decreased ROM;E. Decreased flexibility;F. Decreased strength and endurance;H. Impaired gait;M. Locking or catching   Functional Limitations perform activities of daily living;perform desired leisure / sports activities   Symptom Location lateral knee   How/Where did it occur From insidious onset   Onset date of current episode/exacerbation 02/07/23   Chronicity New   Pain rating (0-10 point scale) Best (/10);Worst (/10)   Best (/10) 2   Worst (/10) 10   Pain quality A. Sharp;B. Dull;C. Aching;E. Shooting;F. Stabbing;G. Cramping   Frequency of pain/symptoms B. Intermittent   Pain/symptoms are: The same all the time   Pain/symptoms exacerbated by A. Sitting;B. Walking;C. Lifting;D. Carrying;E. Rest;I. Bending;J. ADL;K. Home tasks;L. Work tasks;G. Certain positions   Pain/symptoms eased by D. Nothing   Progression of symptoms since onset: Worsened   Fall Risk Screen   Have you fallen 2 or more times in the past year? No   Have you fallen and had an injury in the past year? No   Is patient a fall risk? No   Abuse Screen (yes response referral indicated)   Feels Unsafe at Home or Work/School no  "  Feels Threatened by Someone no   Does Anyone Try to Keep You From Having Contact with Others or Doing Things Outside Your Home? no   Knee Objective Findings   Gait/Locomotion normal   Step Test Height 4\" step down, pain and genu valgus   Palpation tender lateral incision with deep traction, tender lateral patellar facet   Integumentary  peripatellar puffy when knee extended   Side (if bilateral, select both right and left) Right   Right Knee Extension AROM -   Right Knee Flexion AROM 135   Right Knee Flexion Strength 4   Right Knee Extension Strength fair quad set   Right Hip Abduction Strength 4   Right Quad Set Strength fair   R VMO Strength mild atrophy   Right Gastrocnemius Flexibility 20   Right Hamstring Flexibility 60   Right Quadricep Flexibility 120   Planned Therapy Interventions   Planned Therapy Interventions ROM;strengthening;stretching;neuromuscular re-education;manual therapy;joint mobilization   Clinical Impression   Criteria for Skilled Therapeutic Interventions Met yes, treatment indicated   PT Diagnosis R knee pain   Influenced by the following impairments weakness, stiffness   Functional limitations due to impairments standing, walking   Clinical Presentation Stable/Uncomplicated   Clinical Presentation Rationale sp surgery   Clinical Decision Making (Complexity) Low complexity   Therapy Frequency 1 time/week   Predicted Duration of Therapy Intervention (days/wks) 6wk   Risk & Benefits of therapy have been explained Yes   Patient, Family & other staff in agreement with plan of care Yes   Education Assessment   Preferred Learning Style Demonstration   ORTHO GOALS   PT Ortho Eval Goals 2;1;3   Ortho Goal 1   Goal Identifier 1   Goal Description pt will be able to descend stair without rail   Target Date 05/29/23   Ortho Goal 2   Goal Identifier 2   Goal Description pt will be able to sit to stand without knee pain   Target Date 06/12/23   Ortho Goal 3   Goal Identifier 3   Goal Description pt " will be able to sit 1 hour without knee soreness   Target Date 05/29/23   Total Evaluation Time   PT Marina Low Complexity Minutes (89370) 30

## 2023-05-08 ENCOUNTER — HOSPITAL ENCOUNTER (OUTPATIENT)
Dept: PHYSICAL THERAPY | Facility: CLINIC | Age: 58
Setting detail: THERAPIES SERIES
Discharge: HOME | End: 2023-05-08
Attending: PHYSICIAN ASSISTANT
Payer: COMMERCIAL

## 2023-05-08 PROCEDURE — 97110 THERAPEUTIC EXERCISES: CPT | Mod: GP | Performed by: PHYSICAL THERAPIST

## 2023-05-08 PROCEDURE — 97112 NEUROMUSCULAR REEDUCATION: CPT | Mod: GP | Performed by: PHYSICAL THERAPIST

## 2023-05-25 ENCOUNTER — THERAPY VISIT (OUTPATIENT)
Dept: PHYSICAL THERAPY | Facility: CLINIC | Age: 58
End: 2023-05-25
Attending: PHYSICIAN ASSISTANT
Payer: COMMERCIAL

## 2023-05-25 DIAGNOSIS — Z98.890 S/P RIGHT KNEE ARTHROSCOPY: ICD-10-CM

## 2023-05-25 PROCEDURE — 97110 THERAPEUTIC EXERCISES: CPT | Mod: GP | Performed by: PHYSICAL THERAPIST

## 2023-06-03 ENCOUNTER — HEALTH MAINTENANCE LETTER (OUTPATIENT)
Age: 58
End: 2023-06-03

## 2023-06-08 ENCOUNTER — THERAPY VISIT (OUTPATIENT)
Dept: PHYSICAL THERAPY | Facility: CLINIC | Age: 58
End: 2023-06-08
Attending: PHYSICIAN ASSISTANT
Payer: COMMERCIAL

## 2023-06-08 DIAGNOSIS — Z98.890 S/P RIGHT KNEE ARTHROSCOPY: Primary | ICD-10-CM

## 2023-06-08 PROCEDURE — 97140 MANUAL THERAPY 1/> REGIONS: CPT | Mod: GP | Performed by: PHYSICAL THERAPIST

## 2023-06-08 PROCEDURE — 97110 THERAPEUTIC EXERCISES: CPT | Mod: GP | Performed by: PHYSICAL THERAPIST

## 2023-06-09 NOTE — PROGRESS NOTES
"     06/08/23 0701   Appointment Info   Signing clinician's name / credentials Robe Meyer [facility]   Visits Used 5   Medical Diagnosis SP knee scope R   PT Tx Diagnosis knee pain   PT Goal 1   Goal Identifier 1   Goal Description pt will be able to descend stair without rail   Rationale to maximize safety and independence with performance of ADLs and functional tasks   Target Date 05/29/23   Date Met 06/08/23   PT Goal 2   Goal Identifier 2   Goal Description pt will be able to descend stair without rail   Target Date 06/12/23   Rationale to maximize safety and independence with performance of ADLs and functional tasks   Date Met 06/08/23   PT Goal 3   Goal Identifier 3   Goal Description pt will be able to sit 1 hour without knee soreness   Target Date 05/29/23   Rationale to maximize safety and independence with performance of ADLs and functional tasks   Goal Progress intermittently met   Subjective Report   Subjective Report feels like he is having more good days.  Improving.  but still not NORMAL.  feels a catch and click when crossing his legs.  stairs are better.  feels the stretching helped.  But the strengthening ex are not always done because he is active.   Objective Measure 1   Objective Measure 6\" step down- min genu valgus - no pain   Details fair quad contraction   Therapeutic Procedure/Exercise   Therapeutic Procedures: strength, endurance, ROM, flexibillity minutes (58500) 10   Ther Proc 1 - Details review HEP stretches and strength and progression.  needs to focus on keeping the mobility and flexibility.   Skilled Intervention HEP   Patient Response/Progress ind with HEP   Manual Therapy   Manual Therapy: Mobilization, MFR, MLD, friction massage minutes (35422) 20   Manual Therapy 1 - Details lat to med patellar mobs with medial stab ind multple planes, inf patellar mob in 0 and 90 deg supine grade 4.   Skilled Intervention PF mobility   Plan   Plan for next session poss DC   Total Session Time "   Timed Code Treatment Minutes 30   Total Treatment Time (sum of timed and untimed services) 30       DISCHARGE  Reason for Discharge: the patient thinks he has what he needs to continue his recovery.  He is not sure PT will help.  Still thinks there is something floating in his knee.    Equipment Issued:     Discharge Plan: Patient to continue home program.    Referring Provider:  Grzegorz Schneider

## 2023-07-05 ENCOUNTER — TRANSFERRED RECORDS (OUTPATIENT)
Dept: HEALTH INFORMATION MANAGEMENT | Facility: CLINIC | Age: 58
End: 2023-07-05
Payer: COMMERCIAL

## 2023-08-03 ENCOUNTER — TRANSFERRED RECORDS (OUTPATIENT)
Dept: HEALTH INFORMATION MANAGEMENT | Facility: CLINIC | Age: 58
End: 2023-08-03

## 2023-08-16 ENCOUNTER — HOSPITAL ENCOUNTER (OUTPATIENT)
Dept: CT IMAGING | Facility: CLINIC | Age: 58
Discharge: HOME | End: 2023-08-16
Attending: PHYSICIAN ASSISTANT | Admitting: PHYSICIAN ASSISTANT
Payer: COMMERCIAL

## 2023-08-16 DIAGNOSIS — M25.561 RIGHT KNEE PAIN: ICD-10-CM

## 2023-08-16 PROCEDURE — 73700 CT LOWER EXTREMITY W/O DYE: CPT | Mod: RT

## 2023-10-05 ENCOUNTER — TRANSFERRED RECORDS (OUTPATIENT)
Dept: HEALTH INFORMATION MANAGEMENT | Facility: CLINIC | Age: 58
End: 2023-10-05
Payer: COMMERCIAL

## 2023-12-05 ENCOUNTER — TRANSCRIBE ORDERS (OUTPATIENT)
Dept: OTHER | Age: 58
End: 2023-12-05

## 2023-12-05 DIAGNOSIS — Z96.651 S/P TOTAL KNEE ARTHROPLASTY, RIGHT: Primary | ICD-10-CM

## 2023-12-11 ENCOUNTER — OFFICE VISIT (OUTPATIENT)
Dept: FAMILY MEDICINE | Facility: CLINIC | Age: 58
End: 2023-12-11
Payer: COMMERCIAL

## 2023-12-11 VITALS
HEIGHT: 73 IN | SYSTOLIC BLOOD PRESSURE: 128 MMHG | RESPIRATION RATE: 18 BRPM | OXYGEN SATURATION: 96 % | WEIGHT: 250 LBS | BODY MASS INDEX: 33.13 KG/M2 | HEART RATE: 81 BPM | DIASTOLIC BLOOD PRESSURE: 84 MMHG | TEMPERATURE: 97.4 F

## 2023-12-11 DIAGNOSIS — Z13.220 SCREENING FOR LIPID DISORDERS: ICD-10-CM

## 2023-12-11 DIAGNOSIS — Z12.5 SCREENING FOR PROSTATE CANCER: ICD-10-CM

## 2023-12-11 DIAGNOSIS — Z13.1 SCREENING FOR DIABETES MELLITUS: ICD-10-CM

## 2023-12-11 DIAGNOSIS — Z01.818 PREOP GENERAL PHYSICAL EXAM: Primary | ICD-10-CM

## 2023-12-11 DIAGNOSIS — M17.11 ARTHRITIS OF RIGHT KNEE: ICD-10-CM

## 2023-12-11 LAB
ANION GAP SERPL CALCULATED.3IONS-SCNC: 11 MMOL/L (ref 7–15)
BUN SERPL-MCNC: 20 MG/DL (ref 8–23)
CALCIUM SERPL-MCNC: 9.6 MG/DL (ref 8.6–10)
CHLORIDE SERPL-SCNC: 102 MMOL/L (ref 98–107)
CHOLEST SERPL-MCNC: 182 MG/DL
CREAT SERPL-MCNC: 1.08 MG/DL (ref 0.67–1.17)
DEPRECATED HCO3 PLAS-SCNC: 25 MMOL/L (ref 22–29)
EGFRCR SERPLBLD CKD-EPI 2021: 79 ML/MIN/1.73M2
ERYTHROCYTE [DISTWIDTH] IN BLOOD BY AUTOMATED COUNT: 12 % (ref 10–15)
FASTING STATUS PATIENT QL REPORTED: NO
GLUCOSE SERPL-MCNC: 88 MG/DL (ref 70–99)
HBA1C MFR BLD: 5.1 % (ref 0–5.6)
HCT VFR BLD AUTO: 47.1 % (ref 40–53)
HDLC SERPL-MCNC: 42 MG/DL
HGB BLD-MCNC: 16.2 G/DL (ref 13.3–17.7)
LDLC SERPL CALC-MCNC: 103 MG/DL
MCH RBC QN AUTO: 29.2 PG (ref 26.5–33)
MCHC RBC AUTO-ENTMCNC: 34.4 G/DL (ref 31.5–36.5)
MCV RBC AUTO: 85 FL (ref 78–100)
NONHDLC SERPL-MCNC: 140 MG/DL
PLATELET # BLD AUTO: 226 10E3/UL (ref 150–450)
POTASSIUM SERPL-SCNC: 4.2 MMOL/L (ref 3.4–5.3)
PSA SERPL DL<=0.01 NG/ML-MCNC: 1.61 NG/ML (ref 0–3.5)
RBC # BLD AUTO: 5.55 10E6/UL (ref 4.4–5.9)
SODIUM SERPL-SCNC: 138 MMOL/L (ref 135–145)
TRIGL SERPL-MCNC: 186 MG/DL
WBC # BLD AUTO: 6.6 10E3/UL (ref 4–11)

## 2023-12-11 PROCEDURE — 83036 HEMOGLOBIN GLYCOSYLATED A1C: CPT | Performed by: NURSE PRACTITIONER

## 2023-12-11 PROCEDURE — G0103 PSA SCREENING: HCPCS | Performed by: NURSE PRACTITIONER

## 2023-12-11 PROCEDURE — 99214 OFFICE O/P EST MOD 30 MIN: CPT | Performed by: NURSE PRACTITIONER

## 2023-12-11 PROCEDURE — 80048 BASIC METABOLIC PNL TOTAL CA: CPT | Performed by: NURSE PRACTITIONER

## 2023-12-11 PROCEDURE — 85027 COMPLETE CBC AUTOMATED: CPT | Performed by: NURSE PRACTITIONER

## 2023-12-11 PROCEDURE — 80061 LIPID PANEL: CPT | Performed by: NURSE PRACTITIONER

## 2023-12-11 PROCEDURE — 36415 COLL VENOUS BLD VENIPUNCTURE: CPT | Performed by: NURSE PRACTITIONER

## 2023-12-11 RX ORDER — TADALAFIL 5 MG/1
TABLET ORAL
COMMUNITY
Start: 2023-11-01

## 2023-12-11 ASSESSMENT — PAIN SCALES - GENERAL: PAINLEVEL: NO PAIN (0)

## 2023-12-11 NOTE — PROGRESS NOTES
[facility]  [address] [address]  Phone: [phone]  Primary Provider: No Ref-Primary, Physician  Pre-op Performing Provider: ISIDRO KEN      PREOPERATIVE EVALUATION:  Today's date: 12/11/2023    Karthik is a 58 year old, presenting for the following:  Pre-Op Exam        12/11/2023     2:02 PM   Additional Questions   Roomed by Sushma PÉREZ   Accompanied by self       Surgical Information:  Surgery/Procedure: Total Knee Replacement, right  Surgery Location: [facility] [place]  Surgeon: Dr. Mahad Crawley  Surgery Date: 12/28/23  Time of Surgery: TBD  Where patient plans to recover: At home with family  Fax number for surgical facility: [phone]    Assessment & Plan     The proposed surgical procedure is considered LOW risk.    Preop general physical exam  METS >4  - CBC with platelets; Future  - Basic metabolic panel  (Ca, Cl, CO2, Creat, Gluc, K, Na, BUN); Future    Arthritis of right knee  Planned procedure to address    Screening for lipid disorders  Due for physical and since he's having labs drawn today would like needed labs.  - Lipid panel reflex to direct LDL Non-fasting; Future  - Lipid panel reflex to direct LDL Non-fasting    Screening for diabetes mellitus  As above.  - Hemoglobin A1c; Future  - Hemoglobin A1c    Screening for prostate cancer  As above.  - Prostate Specific Antigen Screen; Future  - Prostate Specific Antigen Screen            - No identified additional risk factors other than previously addressed    Antiplatelet or Anticoagulation Medication Instructions:   - Patient is on no antiplatelet or anticoagulation medications.    Additional Medication Instructions:   - triptans, migraine abortives: HOLD on day of surgery    RECOMMENDATION:  APPROVAL GIVEN to proceed with proposed procedure, without further diagnostic evaluation.        Subjective       HPI related to upcoming procedure: history of ongoing knee pain, planned  procedure to address.          12/8/2023    11:09 AM   Preop Questions   1. Have you ever had a heart attack or stroke? No   2. Have you ever had surgery on your heart or blood vessels, such as a stent placement, a coronary artery bypass, or surgery on an artery in your head, neck, heart, or legs? No   3. Do you have chest pain with activity? No   4. Do you have a history of  heart failure? No   5. Do you currently have a cold, bronchitis or symptoms of other infection? No   6. Do you have a cough, shortness of breath, or wheezing? No   7. Do you or anyone in your family have previous history of blood clots? No   8. Do you or does anyone in your family have a serious bleeding problem such as prolonged bleeding following surgeries or cuts? No   9. Have you ever had problems with anemia or been told to take iron pills? No   10. Have you had any abnormal blood loss such as black, tarry or bloody stools? No   11. Have you ever had a blood transfusion? No   12. Are you willing to have a blood transfusion if it is medically needed before, during, or after your surgery? Yes   13. Have you or any of your relatives ever had problems with anesthesia? No   14. Do you have sleep apnea, excessive snoring or daytime drowsiness? No   15. Do you have any artifical heart valves or other implanted medical devices like a pacemaker, defibrillator, or continuous glucose monitor? No   16. Do you have artificial joints? No   17. Are you allergic to latex? No     Health Care Directive:  Patient does not have a Health Care Directive or Living Will:     Preoperative Review of :   reviewed - no record of controlled substances prescribed.      Status of Chronic Conditions:  See problem list for active medical problems.  Problems all longstanding and stable, except as noted/documented.  See ROS for pertinent symptoms related to these conditions.    Review of Systems  CONSTITUTIONAL: NEGATIVE for fever, chills, change in  weight  INTEGUMENTARY/SKIN: NEGATIVE for worrisome rashes, moles or lesions  EYES: NEGATIVE for vision changes or irritation  ENT/MOUTH: NEGATIVE for ear, mouth and throat problems  RESP: NEGATIVE for significant cough or SOB  CV: NEGATIVE for chest pain, palpitations or peripheral edema  GI: NEGATIVE for nausea, abdominal pain, heartburn, or change in bowel habits  : NEGATIVE for frequency, dysuria, or hematuria  MUSCULOSKELETAL: NEGATIVE for significant arthralgias or myalgia  NEURO: NEGATIVE for weakness, dizziness or paresthesias  ENDOCRINE: NEGATIVE for temperature intolerance, skin/hair changes  HEME: NEGATIVE for bleeding problems  PSYCHIATRIC: NEGATIVE for changes in mood or affect    Patient Active Problem List    Diagnosis Date Noted    S/P right knee arthroscopy 05/25/2023     Priority: Medium    Pain in joint involving ankle and foot, left 08/08/2022     Priority: Medium    Moderate episode of recurrent major depressive disorder (H) 08/01/2021     Priority: Medium    Cervical radiculopathy 05/17/2019     Priority: Medium    Cervical stenosis of spinal canal 05/07/2019     Priority: Medium    ED (erectile dysfunction) 07/22/2015     Priority: Medium    Obesity (BMI 30.0-34.9) 06/23/2015     Priority: Medium    CARDIOVASCULAR SCREENING; LDL GOAL LESS THAN 160 03/03/2015     Priority: Medium    Colon polyp 02/06/2015     Priority: Medium     Tubular adenoma polyp with mild villous features. Considered benign. Recommend to return in 5 years return for colonoscopy      Chronic migraine without aura, not intractable 05/28/2014     Priority: Medium    Gynecomastia 08/22/2011     Priority: Medium    Migraine 02/07/2007     Priority: Medium      Past Medical History:   Diagnosis Date    Depression     Migraine     Seasonal allergies      Past Surgical History:   Procedure Laterality Date    APPENDECTOMY      at 14 yo    COLONOSCOPY      COLONOSCOPY N/A 4/8/2021    Procedure: COLONOSCOPY;  Surgeon: Ruben Salmeron  "MD EVENS;  Location: [facility]    GENITOURINARY SURGERY      previous vasectomy 2/2014    VASECTOMY  5/30/2014    Procedure: VASECTOMY;  Surgeon: Zaida Slaughter MD;  Location: [facility]     Current Outpatient Medications   Medication Sig Dispense Refill    SUMAtriptan (IMITREX) 25 MG tablet Take 1-2 tablets (25-50 mg) by mouth at onset of headache May repeat in 1-2 hours after onset. 30 tablet 1    tadalafil (CIALIS) 5 MG tablet       naltrexone-bupropion (CONTRAVE) 8-90 MG per 12 hr tablet Take 1 tablet by mouth 2 times daily         Allergies   Allergen Reactions    Nka [No Known Allergies]         Social History     Tobacco Use    Smoking status: Never    Smokeless tobacco: Never   Substance Use Topics    Alcohol use: Yes     Comment: twice a year.     Family History   Problem Relation Age of Onset    Cancer Mother         lung- uterine    Lung Cancer Mother     Substance Abuse Father     Liver Disease Father     Alzheimer Disease Maternal Grandmother         99    Pneumonia Maternal Grandfather         99     History   Drug Use No         Objective     /84   Pulse 81   Temp 97.4  F (36.3  C) (Tympanic)   Resp 18   Ht 1.861 m (6' 1.25\")   Wt 113.4 kg (250 lb)   SpO2 96%   BMI 32.76 kg/m      Physical Exam    GENERAL APPEARANCE: healthy, alert and no distress     EYES: EOMI,  PERRL     HENT: ear canals and TM's normal and nose and mouth without ulcers or lesions     NECK: no adenopathy, no asymmetry, masses, or scars and thyroid normal to palpation     RESP: lungs clear to auscultation - no rales, rhonchi or wheezes     CV: regular rates and rhythm, normal S1 S2, no S3 or S4 and no murmur, click or rub     ABDOMEN:  soft, nontender, no HSM or masses and bowel sounds normal     MS: extremities normal- no gross deformities noted, no evidence of inflammation in joints, FROM in all extremities.     SKIN: no suspicious lesions or rashes     NEURO: Normal strength and tone, sensory exam grossly normal, " "mentation intact and speech normal     PSYCH: mentation appears normal. and affect normal/bright     LYMPHATICS: No cervical adenopathy    No results for input(s): \"HGB\", \"PLT\", \"INR\", \"NA\", \"POTASSIUM\", \"CR\", \"A1C\" in the last 19830 hours.     Diagnostics:  Labs pending at this time.  Results will be reviewed when available.   No EKG required, no history of coronary heart disease, significant arrhythmia, peripheral arterial disease or other structural heart disease.    Revised Cardiac Risk Index (RCRI):  The patient has the following serious cardiovascular risks for perioperative complications:   - No serious cardiac risks = 0 points     RCRI Interpretation: 0 points: Class I (very low risk - 0.4% complication rate)         Signed Electronically by: SUSY Desai CNP  Copy of this evaluation report is provided to requesting physician.      "

## 2024-01-10 ENCOUNTER — TRANSFERRED RECORDS (OUTPATIENT)
Dept: HEALTH INFORMATION MANAGEMENT | Facility: CLINIC | Age: 59
End: 2024-01-10
Payer: COMMERCIAL

## 2024-01-31 ENCOUNTER — TRANSFERRED RECORDS (OUTPATIENT)
Dept: HEALTH INFORMATION MANAGEMENT | Facility: CLINIC | Age: 59
End: 2024-01-31
Payer: COMMERCIAL

## 2024-03-07 ENCOUNTER — OFFICE VISIT (OUTPATIENT)
Dept: FAMILY MEDICINE | Facility: CLINIC | Age: 59
End: 2024-03-07
Payer: COMMERCIAL

## 2024-03-07 VITALS
DIASTOLIC BLOOD PRESSURE: 84 MMHG | WEIGHT: 266.6 LBS | HEIGHT: 74 IN | TEMPERATURE: 97.9 F | OXYGEN SATURATION: 97 % | SYSTOLIC BLOOD PRESSURE: 132 MMHG | HEART RATE: 85 BPM | BODY MASS INDEX: 34.22 KG/M2 | RESPIRATION RATE: 20 BRPM

## 2024-03-07 DIAGNOSIS — E66.811 CLASS 1 OBESITY DUE TO EXCESS CALORIES WITH BODY MASS INDEX (BMI) OF 34.0 TO 34.9 IN ADULT, UNSPECIFIED WHETHER SERIOUS COMORBIDITY PRESENT: ICD-10-CM

## 2024-03-07 DIAGNOSIS — E66.09 CLASS 1 OBESITY DUE TO EXCESS CALORIES WITH BODY MASS INDEX (BMI) OF 34.0 TO 34.9 IN ADULT, UNSPECIFIED WHETHER SERIOUS COMORBIDITY PRESENT: ICD-10-CM

## 2024-03-07 DIAGNOSIS — E78.2 MIXED HYPERLIPIDEMIA: ICD-10-CM

## 2024-03-07 DIAGNOSIS — Z23 NEED FOR VACCINATION: ICD-10-CM

## 2024-03-07 DIAGNOSIS — L72.3 SEBACEOUS CYST: ICD-10-CM

## 2024-03-07 DIAGNOSIS — Z00.00 ROUTINE GENERAL MEDICAL EXAMINATION AT A HEALTH CARE FACILITY: Primary | ICD-10-CM

## 2024-03-07 PROCEDURE — 90686 IIV4 VACC NO PRSV 0.5 ML IM: CPT | Performed by: STUDENT IN AN ORGANIZED HEALTH CARE EDUCATION/TRAINING PROGRAM

## 2024-03-07 PROCEDURE — 90471 IMMUNIZATION ADMIN: CPT | Performed by: STUDENT IN AN ORGANIZED HEALTH CARE EDUCATION/TRAINING PROGRAM

## 2024-03-07 PROCEDURE — 90480 ADMN SARSCOV2 VAC 1/ONLY CMP: CPT | Performed by: STUDENT IN AN ORGANIZED HEALTH CARE EDUCATION/TRAINING PROGRAM

## 2024-03-07 PROCEDURE — 91320 SARSCV2 VAC 30MCG TRS-SUC IM: CPT | Performed by: STUDENT IN AN ORGANIZED HEALTH CARE EDUCATION/TRAINING PROGRAM

## 2024-03-07 PROCEDURE — 99396 PREV VISIT EST AGE 40-64: CPT | Mod: 25 | Performed by: STUDENT IN AN ORGANIZED HEALTH CARE EDUCATION/TRAINING PROGRAM

## 2024-03-07 PROCEDURE — 99214 OFFICE O/P EST MOD 30 MIN: CPT | Mod: 25 | Performed by: STUDENT IN AN ORGANIZED HEALTH CARE EDUCATION/TRAINING PROGRAM

## 2024-03-07 RX ORDER — ATORVASTATIN CALCIUM 20 MG/1
20 TABLET, FILM COATED ORAL DAILY
Qty: 90 TABLET | Refills: 3 | Status: SHIPPED | OUTPATIENT
Start: 2024-03-07

## 2024-03-07 RX ORDER — MELOXICAM 15 MG/1
15 TABLET ORAL
COMMUNITY
Start: 2024-02-25

## 2024-03-07 SDOH — HEALTH STABILITY: PHYSICAL HEALTH: ON AVERAGE, HOW MANY MINUTES DO YOU ENGAGE IN EXERCISE AT THIS LEVEL?: 10 MIN

## 2024-03-07 SDOH — HEALTH STABILITY: PHYSICAL HEALTH: ON AVERAGE, HOW MANY DAYS PER WEEK DO YOU ENGAGE IN MODERATE TO STRENUOUS EXERCISE (LIKE A BRISK WALK)?: 1 DAY

## 2024-03-07 ASSESSMENT — ANXIETY QUESTIONNAIRES
5. BEING SO RESTLESS THAT IT IS HARD TO SIT STILL: NOT AT ALL
7. FEELING AFRAID AS IF SOMETHING AWFUL MIGHT HAPPEN: NOT AT ALL
2. NOT BEING ABLE TO STOP OR CONTROL WORRYING: NOT AT ALL
4. TROUBLE RELAXING: NOT AT ALL
7. FEELING AFRAID AS IF SOMETHING AWFUL MIGHT HAPPEN: NOT AT ALL
GAD7 TOTAL SCORE: 0
6. BECOMING EASILY ANNOYED OR IRRITABLE: NOT AT ALL
1. FEELING NERVOUS, ANXIOUS, OR ON EDGE: NOT AT ALL
3. WORRYING TOO MUCH ABOUT DIFFERENT THINGS: NOT AT ALL
GAD7 TOTAL SCORE: 0
GAD7 TOTAL SCORE: 0

## 2024-03-07 ASSESSMENT — PATIENT HEALTH QUESTIONNAIRE - PHQ9
SUM OF ALL RESPONSES TO PHQ QUESTIONS 1-9: 6
SUM OF ALL RESPONSES TO PHQ QUESTIONS 1-9: 6
10. IF YOU CHECKED OFF ANY PROBLEMS, HOW DIFFICULT HAVE THESE PROBLEMS MADE IT FOR YOU TO DO YOUR WORK, TAKE CARE OF THINGS AT HOME, OR GET ALONG WITH OTHER PEOPLE: SOMEWHAT DIFFICULT

## 2024-03-07 ASSESSMENT — SOCIAL DETERMINANTS OF HEALTH (SDOH): HOW OFTEN DO YOU GET TOGETHER WITH FRIENDS OR RELATIVES?: ONCE A WEEK

## 2024-03-07 ASSESSMENT — PAIN SCALES - GENERAL: PAINLEVEL: MILD PAIN (3)

## 2024-03-07 NOTE — PATIENT INSTRUCTIONS
Initiate and adjust dose using the following schedule: In patients who do not tolerate a dosage increase, consider delaying the increase for an additional 4 weeks:  Week 1 through week 4 : 0.25 mg once weekly.    Week 5 through week 8: 0.5 mg once weekly.    Week 9 through week 12: 1 mg once weekly.    Week 13 through week 16: 1.7 mg once weekly.    Week 17 and thereafter (maintenance dosage): 2.4 mg once weekly (preferred regimen); if not tolerated, may use an alternative maintenance dosage of 1.7 mg once weekly.        Preventive Care Advice   This is general advice given by our system to help you stay healthy. However, your care team may have specific advice just for you. Please talk to your care team about your preventive care needs.  Nutrition  Eat 5 or more servings of fruits and vegetables each day.  Try wheat bread, brown rice and whole grain pasta (instead of white bread, rice, and pasta).  Get enough calcium and vitamin D. Check the label on foods and aim for 100% of the RDA (recommended daily allowance).  Lifestyle  Exercise at least 150 minutes each week   (30 minutes a day, 5 days a week).  Do muscle strengthening activities 2 days a week. These help control your weight and prevent disease.  No smoking.  Wear sunscreen to prevent skin cancer.  Have a dental exam and cleaning every 6 months.  Yearly exams  See your health care team every year to talk about:  Any changes in your health.  Any medicines your care team has prescribed.  Preventive care, family planning, and ways to prevent chronic diseases.  Shots (vaccines)   HPV shots (up to age 26), if you've never had them before.  Hepatitis B shots (up to age 59), if you've never had them before.  COVID-19 shot: Get this shot when it's due.  Flu shot: Get a flu shot every year.  Tetanus shot: Get a tetanus shot every 10 years.  Pneumococcal, hepatitis A, and RSV shots: Ask your care team if you need these based on your risk.  Shingles shot (for age 50  and up).  General health tests  Diabetes screening:  Starting at age 35, Get screened for diabetes at least every 3 years.  If you are younger than age 35, ask your care team if you should be screened for diabetes.  Cholesterol test: At age 39, start having a cholesterol test every 5 years, or more often if advised.  Bone density scan (DEXA): At age 50, ask your care team if you should have this scan for osteoporosis (brittle bones).  Hepatitis C: Get tested at least once in your life.  STIs (sexually transmitted infections)  Before age 24: Ask your care team if you should be screened for STIs.  After age 24: Get screened for STIs if you're at risk. You are at risk for STIs (including HIV) if:  You are sexually active with more than one person.  You don't use condoms every time.  You or a partner was diagnosed with a sexually transmitted infection.  If you are at risk for HIV, ask about PrEP medicine to prevent HIV.  Get tested for HIV at least once in your life, whether you are at risk for HIV or not.  Cancer screening tests  Cervical cancer screening: If you have a cervix, begin getting regular cervical cancer screening tests at age 21. Most people who have regular screenings with normal results can stop after age 65. Talk about this with your provider.  Breast cancer scan (mammogram): If you've ever had breasts, begin having regular mammograms starting at age 40. This is a scan to check for breast cancer.  Colon cancer screening: It is important to start screening for colon cancer at age 45.  Have a colonoscopy test every 10 years (or more often if you're at risk) Or, ask your provider about stool tests like a FIT test every year or Cologuard test every 3 years.  To learn more about your testing options, visit: https://www.ThingMagic/228935.pdf.  For help making a decision, visit: https://bit.ly/am56595.  Prostate cancer screening test: If you have a prostate and are age 55 to 69, ask your provider if you would  benefit from a yearly prostate cancer screening test.  Lung cancer screening: If you are a current or former smoker age 50 to 80, ask your care team if ongoing lung cancer screenings are right for you.  For informational purposes only. Not to replace the advice of your health care provider. Copyright   2023 [facility] [employer]. All rights reserved. Clinically reviewed by the [facility] Transitions Program. SiOx 815190 - REV 01/24.    Learning About Depression Screening  What is depression screening?  Depression screening is a way to see if you have depression symptoms. It may be done by a doctor or counselor. It's often part of a routine checkup. That's because your mental health is just as important as your physical health.  Depression is a mental health condition that affects how you feel, think, and act. You may:  Have less energy.  Lose interest in your daily activities.  Feel sad and grouchy for a long time.  Depression is very common. It affects people of all ages.  Many things can lead to depression. Some people become depressed after they have a stroke or find out they have a major illness like cancer or heart disease. The death of a loved one or a breakup may lead to depression. It can run in families. Most experts believe that a combination of inherited genes and stressful life events can cause it.  What happens during screening?  You may be asked to fill out a form about your depression symptoms. You and the doctor will discuss your answers. The doctor may ask you more questions to learn more about how you think, act, and feel.  What happens after screening?  If you have symptoms of depression, your doctor will talk to you about your options.  Doctors usually treat depression with medicines or counseling. Often, combining the two works best. Many people don't get help because they think that they'll get over the depression on their own. But people with depression may not get better  "unless they get treatment.  The cause of depression is not well understood. There may be many factors involved. But if you have depression, it's not your fault.  A serious symptom of depression is thinking about death or suicide. If you or someone you care about talks about this or about feeling hopeless, get help right away.  It's important to know that depression can be treated. Medicine, counseling, and self-care may help.  Where can you learn more?  Go to https://www.[place] [employer]ARK.[employer]/patiented  Enter T185 in the search box to learn more about \"Learning About Depression Screening.\"  Current as of: June 25, 2023               Content Version: 13.8    5519-6584 [employer].   Care instructions adapted under license by your healthcare professional. If you have questions about a medical condition or this instruction, always ask your healthcare professional. [employer] disclaims any warranty or liability for your use of this information.      Safer Sex: Care Instructions  Overview  Safer sex is a way to reduce your risk of getting a sexually transmitted infection (STI). It can also help prevent pregnancy.  Several products can help you practice safer sex and reduce your chance of STIs. One of the best is a condom. There are internal and external condoms. You can use a special rubber sheet (dental dam) for protection during oral sex. Disposable gloves can keep your hands from touching blood, semen, or other body fluids that can carry infections.  Remember that birth control methods such as diaphragms, IUDs, foams, and birth control pills do not stop you from getting STIs.  Follow-up care is a key part of your treatment and safety. Be sure to make and go to all appointments, and call your doctor if you are having problems. It's also a good idea to know your test results and keep a list of the medicines you take.  How can you care for yourself at home?  Think about getting vaccinated to help " "prevent hepatitis A, hepatitis B, and human papillomavirus (HPV). They can be spread through sex.  Use a condom every time you have sex. Use an external condom, which goes on the penis. Or use an internal condom, which goes into the vagina or anus.  Make sure you use the right size external condom. A condom that's too small can break easily. A condom that's too big can slip off during sex.  Use a new condom each time you have sex. Be careful not to poke a hole in the condom when you open the wrapper.  Don't use an internal condom and an external condom at the same time.  Never use petroleum jelly (such as Vaseline), grease, hand lotion, baby oil, or anything with oil in it. These products can make holes in the condom.  After intercourse, hold the edge of the condom as you remove it. This will help keep semen from spilling out of the condom.  Do not have sex with anyone who has symptoms of an STI, such as sores on the genitals or mouth.  Do not drink a lot of alcohol or use drugs before sex.  Limit your sex partners. Sex with one partner who has sex only with you can reduce your risk of getting an STI.  Don't share sex toys. But if you do share them, use a condom and clean the sex toys between each use.  Talk to your partner(s) before you have sex. Talk about what you feel comfortable with and whether you have any boundaries with sex. And find out if your partner(s) may be at risk for any STI. Keep in mind that a person may be able to spread an STI even if they do not have symptoms. You and your partner(s) may want to get tested for STIs.  Where can you learn more?  Go to https://www.[employer] Services.net/patiented  Enter B608 in the search box to learn more about \"Safer Sex: Care Instructions.\"  Current as of: April 19, 2023               Content Version: 13.8    4955-6917 [employer].   Care instructions adapted under license by your healthcare professional. If you have questions about a medical condition or " this instruction, always ask your healthcare professional. [employer] disclaims any warranty or liability for your use of this information.

## 2024-03-07 NOTE — NURSING NOTE
Immunizations Administered       Name Date Dose VIS Date Route    COVID-19 12+ (2023-24) (Pfizer) 3/7/24  2:20 PM 0.3 mL EUI,10/19/2023,Given today Intramuscular    INFLUENZA VACCINE >6 MONTHS, QUAD,PF 3/7/24  2:20 PM 0.5 mL 08/06/2021, Given Today Intramuscular          After obtaining informed consent, the immunization is given, one in each deltoid. Patient confirmed identity and verified immunization questions prior to administration. Patient is remaining in clinic for 15 minutes to monitor for adverse reactions.    Gabby SCOTT LPN on 3/7/2024 at 2:26 PM

## 2024-03-11 ENCOUNTER — TELEPHONE (OUTPATIENT)
Dept: FAMILY MEDICINE | Facility: CLINIC | Age: 59
End: 2024-03-11
Payer: COMMERCIAL

## 2024-03-12 NOTE — TELEPHONE ENCOUNTER
Prior Authorization Retail Medication Request    Medication/Dose: wegovy  Diagnosis and ICD code (if different than what is on RX):    New/renewal/insurance change PA/secondary ins. PA:  Previously Tried and Failed:  contrave; qsymia;   Rationale:      Insurance   Primary: not provided  Insurance ID:  not provided  CMM Key: [id number]    Secondary (if applicable):  Insurance ID:      Pharmacy Information (if different than what is on RX)  Name:    Phone:    Fax:

## 2024-03-14 ENCOUNTER — TRANSFERRED RECORDS (OUTPATIENT)
Dept: HEALTH INFORMATION MANAGEMENT | Facility: CLINIC | Age: 59
End: 2024-03-14
Payer: COMMERCIAL

## 2024-03-22 NOTE — TELEPHONE ENCOUNTER
Central Prior Authorization Team   Phone: [phone]    PA Initiation    Medication: Wegovy 0.5MG/0.5ML auto-injectors    Insurance Company: [employer] - Phone [phone] Fax [phone]  Pharmacy Filling the Rx: [facility] [address] [place], [place] - [address]  Filling Pharmacy Phone: [phone]  Filling Pharmacy Fax:    Start Date: 3/22/2024

## 2024-03-22 NOTE — TELEPHONE ENCOUNTER
Prior Authorization Not Needed per Insurance    Medication: Wegovy 0.5MG/0.5ML auto-injectors    Insurance Company: [employer] - Phone [phone] Fax [phone]  Expected CoPay:      Pharmacy Filling the Rx: [facility] [address] [place] [address] - [address]  Pharmacy Notified:  yes  Patient Notified:  yes    Medication previously approved, effective for all strengths until 10/7/2024  Confirmed with pharmacy that they have a paid claim for the Wegovy 0.25mg pen injectors.  The 0.5mg is not needing to be filled yet, this is just refill too soon.

## 2024-03-26 ENCOUNTER — MYC MEDICAL ADVICE (OUTPATIENT)
Dept: FAMILY MEDICINE | Facility: CLINIC | Age: 59
End: 2024-03-26
Payer: COMMERCIAL

## 2024-03-26 DIAGNOSIS — E66.09 CLASS 1 OBESITY DUE TO EXCESS CALORIES WITH BODY MASS INDEX (BMI) OF 34.0 TO 34.9 IN ADULT, UNSPECIFIED WHETHER SERIOUS COMORBIDITY PRESENT: Primary | ICD-10-CM

## 2024-03-26 DIAGNOSIS — E66.811 CLASS 1 OBESITY DUE TO EXCESS CALORIES WITH BODY MASS INDEX (BMI) OF 34.0 TO 34.9 IN ADULT, UNSPECIFIED WHETHER SERIOUS COMORBIDITY PRESENT: Primary | ICD-10-CM

## 2024-03-28 NOTE — TELEPHONE ENCOUNTER
Dr. Escamilla,  Patient sent a My Chart note regarding availability of Wegovy. He is asking for a prescription for Zepbound.   Natividad Guerra

## 2024-03-29 ENCOUNTER — TELEPHONE (OUTPATIENT)
Dept: FAMILY MEDICINE | Facility: CLINIC | Age: 59
End: 2024-03-29
Payer: COMMERCIAL

## 2024-03-29 DIAGNOSIS — E66.09 CLASS 1 OBESITY DUE TO EXCESS CALORIES WITH BODY MASS INDEX (BMI) OF 34.0 TO 34.9 IN ADULT, UNSPECIFIED WHETHER SERIOUS COMORBIDITY PRESENT: ICD-10-CM

## 2024-03-29 DIAGNOSIS — E66.811 CLASS 1 OBESITY DUE TO EXCESS CALORIES WITH BODY MASS INDEX (BMI) OF 34.0 TO 34.9 IN ADULT, UNSPECIFIED WHETHER SERIOUS COMORBIDITY PRESENT: ICD-10-CM

## 2024-03-29 NOTE — TELEPHONE ENCOUNTER
Medication Question or Refill        What medication are you calling about (include dose and sig)?: Zepbound    Patient calling requesting prescription for Zepbound be sent to [facility] in [place] and would like Rx for Wegovy cancelled.         OCHOA Jaimes       Could we send this information to you in [facility] or would you prefer to receive a phone call?:   Patient would prefer a phone call   Okay to leave a detailed message?: Yes at Home number on file [phone] (home)      OCHOA Jaimes

## 2024-03-29 NOTE — CONFIDENTIAL NOTE
Prescription signed and sent for Zepbound to [facility] in [place]. Discontinued the prescription for webarbvy.     Gaviota Escamilla MD

## 2024-03-30 ENCOUNTER — TELEPHONE (OUTPATIENT)
Dept: FAMILY MEDICINE | Facility: CLINIC | Age: 59
End: 2024-03-30
Payer: COMMERCIAL

## 2024-04-03 ENCOUNTER — TELEPHONE (OUTPATIENT)
Dept: FAMILY MEDICINE | Facility: CLINIC | Age: 59
End: 2024-04-03

## 2024-04-03 DIAGNOSIS — E66.09 CLASS 1 OBESITY DUE TO EXCESS CALORIES WITH BODY MASS INDEX (BMI) OF 34.0 TO 34.9 IN ADULT, UNSPECIFIED WHETHER SERIOUS COMORBIDITY PRESENT: ICD-10-CM

## 2024-04-03 DIAGNOSIS — E66.811 CLASS 1 OBESITY DUE TO EXCESS CALORIES WITH BODY MASS INDEX (BMI) OF 34.0 TO 34.9 IN ADULT, UNSPECIFIED WHETHER SERIOUS COMORBIDITY PRESENT: ICD-10-CM

## 2024-04-03 NOTE — TELEPHONE ENCOUNTER
Prior Authorization required on Zepbound 2.5 mg  Insurance Phone [phone]  Patient ID [id number]  Please contact the pharmacy with Prior Auth status (approved/denied)    Thank you  Andressa Daly [facility]  [phone]

## 2024-04-16 NOTE — TELEPHONE ENCOUNTER
PA Initiation    Medication: ZEPBOUND 2.5 MG/0.5ML SC SOAJ  Insurance Company: [employer] - Phone [phone] Fax [phone]  Pharmacy Filling the Rx: [facility] [place], [place] - [address]  Filling Pharmacy Phone: [phone]  Filling Pharmacy Fax: [phone]  Start Date: 4/16/2024

## 2024-04-17 NOTE — TELEPHONE ENCOUNTER
Prior Authorization Approval    Medication: ZEPBOUND 2.5 MG/0.5ML SC SOAJ  Authorization Effective Date: 4/16/2024  Authorization Expiration Date: 12/12/2024  Approved Dose/Quantity:   Reference #:     Insurance Company: [employer]Specialty PA's - Phone [phone] Fax [phone]  Expected CoPay: $    CoPay Card Available:      Financial Assistance Needed:   Which Pharmacy is filling the prescription: [facility] [place], [place] - [address] [place] [address]  Pharmacy Notified: YES  Patient Notified: **Instructed pharmacy to notify patient when script is ready to /ship.**

## 2024-04-19 ENCOUNTER — TELEPHONE (OUTPATIENT)
Dept: FAMILY MEDICINE | Facility: CLINIC | Age: 59
End: 2024-04-19
Payer: COMMERCIAL

## 2024-04-19 DIAGNOSIS — E66.811 OBESITY (BMI 30.0-34.9): Primary | ICD-10-CM

## 2024-04-19 NOTE — CONFIDENTIAL NOTE
Order placed for Zepbound 5 mg to be filled 1 month.  Prescription sent to patient's pharmacy on file.    Gaviota Escamilla MD

## 2024-04-19 NOTE — TELEPHONE ENCOUNTER
Pt has questions re: zepbound Rx. He initially called 12 pharmacies to find this medication.  States he picked up the zepbound 2.5mg  [facility]  This Rx is for 1mos.   Due to availability of this medication, pt is concerned about finding the 5mg dose when it is needed in 1 mos. He does not want to have a lapse in time from month 1 to month 2.  Pt is requesting that provider order the zepbound 5mg now so he can start working on finding a pharmacy that has this dose. If he does not tolerate the 2.5mg dose, he will not start the 5mg dose & will notify the clinic.   Would it be reasonable to order the next zepbound dose of 5mg to start in 1 mos & list the pharmacy as [facility] (his primary pharmacy)? We would possibly need to change the pharmacy depending on where this med is available at that time.    Routing to DR Escamilla for review.    Larissa Walters RN

## 2024-05-31 ENCOUNTER — LAB (OUTPATIENT)
Dept: LAB | Facility: CLINIC | Age: 59
End: 2024-05-31
Payer: COMMERCIAL

## 2024-05-31 DIAGNOSIS — E78.2 MIXED HYPERLIPIDEMIA: ICD-10-CM

## 2024-05-31 LAB
ALBUMIN SERPL BCG-MCNC: 4.7 G/DL (ref 3.5–5.2)
ALP SERPL-CCNC: 75 U/L (ref 40–150)
ALT SERPL W P-5'-P-CCNC: 14 U/L (ref 0–70)
AST SERPL W P-5'-P-CCNC: 22 U/L (ref 0–45)
BILIRUB DIRECT SERPL-MCNC: <0.2 MG/DL (ref 0–0.3)
BILIRUB SERPL-MCNC: 0.4 MG/DL
PROT SERPL-MCNC: 7.3 G/DL (ref 6.4–8.3)

## 2024-05-31 PROCEDURE — 36415 COLL VENOUS BLD VENIPUNCTURE: CPT

## 2024-05-31 PROCEDURE — 80076 HEPATIC FUNCTION PANEL: CPT

## 2024-06-05 ENCOUNTER — TRANSFERRED RECORDS (OUTPATIENT)
Dept: HEALTH INFORMATION MANAGEMENT | Facility: CLINIC | Age: 59
End: 2024-06-05
Payer: COMMERCIAL

## 2024-06-06 ENCOUNTER — MYC MEDICAL ADVICE (OUTPATIENT)
Dept: FAMILY MEDICINE | Facility: CLINIC | Age: 59
End: 2024-06-06
Payer: COMMERCIAL

## 2024-06-06 DIAGNOSIS — E66.811 OBESITY (BMI 30.0-34.9): Primary | ICD-10-CM

## 2024-06-06 NOTE — TELEPHONE ENCOUNTER
Dr. Escamilla,    Please see [email or web address] request below and advise.    Thank you  Donald HORNER RN  [facility]

## 2024-06-19 ENCOUNTER — TRANSFERRED RECORDS (OUTPATIENT)
Dept: HEALTH INFORMATION MANAGEMENT | Facility: CLINIC | Age: 59
End: 2024-06-19
Payer: COMMERCIAL

## 2024-07-08 ENCOUNTER — MYC MEDICAL ADVICE (OUTPATIENT)
Dept: FAMILY MEDICINE | Facility: CLINIC | Age: 59
End: 2024-07-08
Payer: COMMERCIAL

## 2024-07-08 DIAGNOSIS — E66.811 OBESITY (BMI 30.0-34.9): Primary | ICD-10-CM

## 2024-07-08 NOTE — TELEPHONE ENCOUNTER
Pt reports doing well on zepbound. See [employer] message. Pt would like to titrate to 10mg.   Pended order      John Sears RN

## 2024-07-10 ENCOUNTER — TELEPHONE (OUTPATIENT)
Dept: FAMILY MEDICINE | Facility: CLINIC | Age: 59
End: 2024-07-10
Payer: COMMERCIAL

## 2024-07-10 DIAGNOSIS — M25.552 HIP PAIN, LEFT: Primary | ICD-10-CM

## 2024-07-10 NOTE — TELEPHONE ENCOUNTER
Order/Referral Request    Who is requesting: pt    Orders being requested: referral to [facility]    Reason service is needed/diagnosis: previously discussed    When are orders needed by: asap    Has this been discussed with Provider: Yes    Does patient have a preference on a Group/Provider/Facility? [facility]    Does patient have an appointment scheduled?: No    Where to send orders: Fax    Could we send this information to you in [place] or would you prefer to receive a phone call?:   Patient would prefer a phone call   Okay to leave a detailed message?: Yes at Cell number on file:    Telephone Information:   Mobile [phone]

## 2024-07-11 NOTE — TELEPHONE ENCOUNTER
Gaviota Escamilla MD  You6 minutes ago (1:54 PM)     AB  Signed the order! Thank you so much for pending the order for me :)    Gaviota

## 2024-07-11 NOTE — TELEPHONE ENCOUNTER
Last OV 3/7/24 with PCP  Pt has been seeing [facility] for left hip pain. Pt states he thought he brought it up at the last OV with PCP.     Pt asking for referral to [facility].    Routing to PCP for review: Dr Escamilla, I have pended a referral for your consideration.    Larissa Walters RN

## 2024-07-11 NOTE — TELEPHONE ENCOUNTER
Called pt & he requests to have referral faxed to [facility] in [place].  Called [facility] & got fax # [phone]  Faxed referral.    Larissa Walters RN

## 2024-08-20 ENCOUNTER — OFFICE VISIT (OUTPATIENT)
Dept: DERMATOLOGY | Facility: CLINIC | Age: 59
End: 2024-08-20
Payer: COMMERCIAL

## 2024-08-20 DIAGNOSIS — L82.1 SEBORRHEIC KERATOSES: ICD-10-CM

## 2024-08-20 DIAGNOSIS — D23.9 DERMAL NEVUS: Primary | ICD-10-CM

## 2024-08-20 DIAGNOSIS — L81.4 LENTIGO: ICD-10-CM

## 2024-08-20 DIAGNOSIS — D18.01 ANGIOMA OF SKIN: ICD-10-CM

## 2024-08-20 DIAGNOSIS — L72.0 EPIDERMAL CYST: ICD-10-CM

## 2024-08-20 PROCEDURE — 99243 OFF/OP CNSLTJ NEW/EST LOW 30: CPT | Performed by: DERMATOLOGY

## 2024-08-20 NOTE — PATIENT INSTRUCTIONS
The brown spot on your back is called a Seborrheic Keratosis.    The cyst on your back can be removed by an in office surgical procedure. Local numbing will be used. The sack will be removed and sutures placed under the skin and on top of the skin. The cyst will be sent to be read by a pathologist. We will notify you of the results.

## 2024-08-20 NOTE — LETTER
8/20/2024      Karthik Bird  [id number] [facility] [address]      Dear Colleague,    Thank you for referring your patient, Karthik Bird, to the [facility]. Please see a copy of my visit note below.    Karthik Bird , a 59 year old year old male patient, I was asked to see by Dr. Escamilla for spots on back.  Patient has no other skin complaints today.  Remainder of the HPI, Meds, PMH, Allergies, FH, and SH was reviewed in chart.      Past Medical History:   Diagnosis Date     Depression      Migraine      Seasonal allergies        Past Surgical History:   Procedure Laterality Date     APPENDECTOMY      at 16 yo     COLONOSCOPY       COLONOSCOPY N/A 04/08/2021    Procedure: COLONOSCOPY;  Surgeon: Ruben Salmeron MD;  Location: [facility]     GENITOURINARY SURGERY      previous vasectomy 2/2014     ORTHOPEDIC SURGERY  12/28/2023    full knee replacement     SOFT TISSUE SURGERY  2021    rotator cuff surgery     VASECTOMY  05/30/2014    Procedure: VASECTOMY;  Surgeon: Zaida Slaughter MD;  Location: [facility]        Family History   Problem Relation Age of Onset     Cancer Mother         lung- uterine     Lung Cancer Mother      Breast Cancer Mother      Obesity Mother      Substance Abuse Father      Liver Disease Father      Alzheimer Disease Maternal Grandmother         99     Pneumonia Maternal Grandfather         99       Social History     Socioeconomic History     Marital status:      Spouse name: Not on file     Number of children: Not on file     Years of education: Not on file     Highest education level: Not on file   Occupational History     Not on file   Tobacco Use     Smoking status: Never     Smokeless tobacco: Never   Vaping Use     Vaping status: Never Used   Substance and Sexual Activity     Alcohol use: Not Currently     Comment: twice a year.     Drug use: No     Sexual activity: Yes     Partners: Female     Birth control/protection: Male Surgical   Other Topics  Concern     Parent/sibling w/ CABG, MI or angioplasty before 65F 55M? No   Social History Narrative     Not on file     Social Determinants of Health     Financial Resource Strain: Low Risk  (3/7/2024)    Financial Resource Strain      Within the past 12 months, have you or your family members you live with been unable to get utilities (heat, electricity) when it was really needed?: No   Food Insecurity: Low Risk  (3/7/2024)    Food Insecurity      Within the past 12 months, did you worry that your food would run out before you got money to buy more?: No      Within the past 12 months, did the food you bought just not last and you didn t have money to get more?: No   Transportation Needs: Low Risk  (3/7/2024)    Transportation Needs      Within the past 12 months, has lack of transportation kept you from medical appointments, getting your medicines, non-medical meetings or appointments, work, or from getting things that you need?: No   Physical Activity: Insufficiently Active (3/7/2024)    Exercise Vital Sign      Days of Exercise per Week: 1 day      Minutes of Exercise per Session: 10 min   Stress: No Stress Concern Present (3/7/2024)    Citizen of [place] [employer] Occupational [employer] - Occupational Stress Questionnaire      Feeling of Stress : Not at all   Social Connections: Unknown (3/7/2024)    Social Connection and Isolation Panel [NHANES]      Frequency of Communication with Friends and Family: Not on file      Frequency of Social Gatherings with Friends and Family: Once a week      Attends Anabaptism Services: Not on file      Active Member of Clubs or Organizations: Not on file      Attends Club or Organization Meetings: Not on file      Marital Status: Not on file   Interpersonal Safety: Low Risk  (3/7/2024)    Interpersonal Safety      Do you feel physically and emotionally safe where you currently live?: Yes      Within the past 12 months, have you been hit, slapped, kicked or otherwise physically hurt by  someone?: No      Within the past 12 months, have you been humiliated or emotionally abused in other ways by your partner or ex-partner?: No   Housing Stability: Low Risk  (3/7/2024)    Housing Stability      Do you have housing? : Yes      Are you worried about losing your housing?: No       Outpatient Encounter Medications as of 8/20/2024   Medication Sig Dispense Refill     atorvastatin (LIPITOR) 20 MG tablet Take 1 tablet (20 mg) by mouth daily 90 tablet 3     meloxicam (MOBIC) 15 MG tablet Take 15 mg by mouth daily at 2 pm       SUMAtriptan (IMITREX) 25 MG tablet Take 1-2 tablets (25-50 mg) by mouth at onset of headache May repeat in 1-2 hours after onset. 30 tablet 1     tadalafil (CIALIS) 5 MG tablet        tirzepatide-Weight Management (ZEPBOUND) 10 MG/0.5ML prefilled pen Inject 0.5 mLs (10 mg) Subcutaneous every 7 days 3 mL 1     tirzepatide-Weight Management (ZEPBOUND) 2.5 MG/0.5ML prefilled pen Inject 0.5 mLs (2.5 mg) Subcutaneous every 7 days 3 mL 0     tirzepatide-Weight Management (ZEPBOUND) 5 MG/0.5ML prefilled pen Inject 0.5 mLs (5 mg) Subcutaneous every 7 days 2 mL 0     tirzepatide-Weight Management (ZEPBOUND) 7.5 MG/0.5ML prefilled pen Inject 0.5 mLs (7.5 mg) Subcutaneous every 7 days 3 mL 0     No facility-administered encounter medications on file as of 8/20/2024.             Review Of Systems  Skin: As above  Eyes: negative  Ears/Nose/Throat: negative  Respiratory: No shortness of breath, dyspnea on exertion, cough, or hemoptysis  Cardiovascular: negative  Gastrointestinal: negative  Genitourinary: negative  Musculoskeletal: negative  Neurologic: negative  Psychiatric: negative  Hematologic/Lymphatic/Immunologic: negative  Endocrine: negative      O:   NAD, WDWN, Alert & Oriented, Mood & Affect wnl, Vitals stable   General appearance jose francisco ii   Vitals stable   Alert, oriented and in no acute distress   L uppe rback nodule with comedone   Stuck on papules and brown macules on trunk and ext    Red  papules on trunk   Flesh colored papules on trunk          Eyes: Conjunctivae/lids:Normal     ENT: Lips, mucosa: normal    MSK:Normal    Cardiovascular: peripheral edema none    Pulm: Breathing Normal    Neuro/Psych: Orientation:Normal; Mood/Affect:Normal      A/P:  1. Seborrheic keratosis, lentigo, angioma, dermal nevus  2. Epidermal cyst  Excision discussed with patient   Schedule   It was a pleasure speaking to Karthik Bird today.  Previous clinic  notes and pertinent laboratory tests were reviewed prior to Karthik Bird's visit.  Nature and genetics of benign skin lesions dicussed with patient.return to clinic next appt      Again, thank you for allowing me to participate in the care of your patient.        Sincerely,        Sky Jacobs MD

## 2024-08-20 NOTE — PROGRESS NOTES
Karthik Bird , a 59 year old year old male patient, I was asked to see by Dr. Escamilla for spots on back.  Patient has no other skin complaints today.  Remainder of the HPI, Meds, PMH, Allergies, FH, and SH was reviewed in chart.      Past Medical History:   Diagnosis Date    Depression     Migraine     Seasonal allergies        Past Surgical History:   Procedure Laterality Date    APPENDECTOMY      at 14 yo    COLONOSCOPY      COLONOSCOPY N/A 04/08/2021    Procedure: COLONOSCOPY;  Surgeon: Ruben Salmeron MD;  Location: [facility]    GENITOURINARY SURGERY      previous vasectomy 2/2014    ORTHOPEDIC SURGERY  12/28/2023    full knee replacement    SOFT TISSUE SURGERY  2021    rotator cuff surgery    VASECTOMY  05/30/2014    Procedure: VASECTOMY;  Surgeon: Zaida Slaughter MD;  Location: [facility]        Family History   Problem Relation Age of Onset    Cancer Mother         lung- uterine    Lung Cancer Mother     Breast Cancer Mother     Obesity Mother     Substance Abuse Father     Liver Disease Father     Alzheimer Disease Maternal Grandmother         99    Pneumonia Maternal Grandfather         99       Social History     Socioeconomic History    Marital status:      Spouse name: Not on file    Number of children: Not on file    Years of education: Not on file    Highest education level: Not on file   Occupational History    Not on file   Tobacco Use    Smoking status: Never    Smokeless tobacco: Never   Vaping Use    Vaping status: Never Used   Substance and Sexual Activity    Alcohol use: Not Currently     Comment: twice a year.    Drug use: No    Sexual activity: Yes     Partners: Female     Birth control/protection: Male Surgical   Other Topics Concern    Parent/sibling w/ CABG, MI or angioplasty before 65F 55M? No   Social History Narrative    Not on file     Social Determinants of Health     Financial Resource Strain: Low Risk  (3/7/2024)    Financial Resource Strain     Within the past 12 months,  have you or your family members you live with been unable to get utilities (heat, electricity) when it was really needed?: No   Food Insecurity: Low Risk  (3/7/2024)    Food Insecurity     Within the past 12 months, did you worry that your food would run out before you got money to buy more?: No     Within the past 12 months, did the food you bought just not last and you didn t have money to get more?: No   Transportation Needs: Low Risk  (3/7/2024)    Transportation Needs     Within the past 12 months, has lack of transportation kept you from medical appointments, getting your medicines, non-medical meetings or appointments, work, or from getting things that you need?: No   Physical Activity: Insufficiently Active (3/7/2024)    Exercise Vital Sign     Days of Exercise per Week: 1 day     Minutes of Exercise per Session: 10 min   Stress: No Stress Concern Present (3/7/2024)    [place] [employer] Occupational Health - Occupational Stress Questionnaire     Feeling of Stress : Not at all   Social Connections: Unknown (3/7/2024)    Social Connection and Isolation Panel [NHANES]     Frequency of Communication with Friends and Family: Not on file     Frequency of Social Gatherings with Friends and Family: Once a week     Attends Buddhist Services: Not on file     Active Member of Clubs or Organizations: Not on file     Attends Club or Organization Meetings: Not on file     Marital Status: Not on file   Interpersonal Safety: Low Risk  (3/7/2024)    Interpersonal Safety     Do you feel physically and emotionally safe where you currently live?: Yes     Within the past 12 months, have you been hit, slapped, kicked or otherwise physically hurt by someone?: No     Within the past 12 months, have you been humiliated or emotionally abused in other ways by your partner or ex-partner?: No   Housing Stability: Low Risk  (3/7/2024)    Housing Stability     Do you have housing? : Yes     Are you worried about losing your housing?:  No       Outpatient Encounter Medications as of 8/20/2024   Medication Sig Dispense Refill    atorvastatin (LIPITOR) 20 MG tablet Take 1 tablet (20 mg) by mouth daily 90 tablet 3    meloxicam (MOBIC) 15 MG tablet Take 15 mg by mouth daily at 2 pm      SUMAtriptan (IMITREX) 25 MG tablet Take 1-2 tablets (25-50 mg) by mouth at onset of headache May repeat in 1-2 hours after onset. 30 tablet 1    tadalafil (CIALIS) 5 MG tablet       tirzepatide-Weight Management (ZEPBOUND) 10 MG/0.5ML prefilled pen Inject 0.5 mLs (10 mg) Subcutaneous every 7 days 3 mL 1    tirzepatide-Weight Management (ZEPBOUND) 2.5 MG/0.5ML prefilled pen Inject 0.5 mLs (2.5 mg) Subcutaneous every 7 days 3 mL 0    tirzepatide-Weight Management (ZEPBOUND) 5 MG/0.5ML prefilled pen Inject 0.5 mLs (5 mg) Subcutaneous every 7 days 2 mL 0    tirzepatide-Weight Management (ZEPBOUND) 7.5 MG/0.5ML prefilled pen Inject 0.5 mLs (7.5 mg) Subcutaneous every 7 days 3 mL 0     No facility-administered encounter medications on file as of 8/20/2024.             Review Of Systems  Skin: As above  Eyes: negative  Ears/Nose/Throat: negative  Respiratory: No shortness of breath, dyspnea on exertion, cough, or hemoptysis  Cardiovascular: negative  Gastrointestinal: negative  Genitourinary: negative  Musculoskeletal: negative  Neurologic: negative  Psychiatric: negative  Hematologic/Lymphatic/Immunologic: negative  Endocrine: negative      O:   NAD, WDWN, Alert & Oriented, Mood & Affect wnl, Vitals stable   General appearance jose francisco ii   Vitals stable   Alert, oriented and in no acute distress   L uppe rback nodule with comedone   Stuck on papules and brown macules on trunk and ext    Red papules on trunk   Flesh colored papules on trunk          Eyes: Conjunctivae/lids:Normal     ENT: Lips, mucosa: normal    MSK:Normal    Cardiovascular: peripheral edema none    Pulm: Breathing Normal    Neuro/Psych: Orientation:Normal; Mood/Affect:Normal      A/P:  1. Seborrheic keratosis,  lentigo, angioma, dermal nevus  2. Epidermal cyst  Excision discussed with patient   Schedule   It was a pleasure speaking to Karthik Bird today.  Previous clinic  notes and pertinent laboratory tests were reviewed prior to Karthik Bird's visit.  Nature and genetics of benign skin lesions dicussed with patient.return to clinic next appt

## 2024-09-03 DIAGNOSIS — E66.811 OBESITY (BMI 30.0-34.9): ICD-10-CM

## 2024-09-03 RX ORDER — TIRZEPATIDE 10 MG/.5ML
INJECTION, SOLUTION SUBCUTANEOUS
Qty: 4 ML | Refills: 0 | Status: SHIPPED | OUTPATIENT
Start: 2024-09-03

## 2024-09-04 ENCOUNTER — OFFICE VISIT (OUTPATIENT)
Dept: DERMATOLOGY | Facility: CLINIC | Age: 59
End: 2024-09-04
Payer: COMMERCIAL

## 2024-09-04 DIAGNOSIS — L72.0 EPIDERMAL CYST: Primary | ICD-10-CM

## 2024-09-04 PROCEDURE — 88331 PATH CONSLTJ SURG 1 BLK 1SPC: CPT | Performed by: DERMATOLOGY

## 2024-09-04 PROCEDURE — 11402 EXC TR-EXT B9+MARG 1.1-2 CM: CPT | Performed by: DERMATOLOGY

## 2024-09-04 PROCEDURE — 12031 INTMD RPR S/A/T/EXT 2.5 CM/<: CPT | Performed by: DERMATOLOGY

## 2024-09-04 ASSESSMENT — PAIN SCALES - GENERAL: PAINLEVEL: NO PAIN (0)

## 2024-09-04 NOTE — PATIENT INSTRUCTIONS
Sutured Wound Care     [facility]: [phone]    [facility]: [phone]          No strenuous activity for 48 hours. Resume moderate activity in 48 hours. No heavy exercising until you are seen for follow up in one week.     Take Tylenol as needed for discomfort.                         Do not drink alcoholic beverages for 48 hours.     Keep the pressure bandage in place for 24 hours. If the bandage becomes blood tinged or loose, reinforce it with gauze and tape.        (Refer to the reverse side of this page for management of bleeding).    Remove pressure bandage in 24 hours     Leave the flat bandage in place for one week. ()    Keep the bandage dry. Wash around it carefully.    If the tape becomes soiled or starts to come off, reinforce it with additional paper tape.    Do not smoke for 3 weeks; smoking is detrimental to wound healing.    It is normal to have swelling and bruising around the surgical site. The bruising will fade in approximately 10-14 days. Elevate the area to reduce swelling.    Numbness, itchiness and sensitivity to temperature changes can occur after surgery and may take up to 18 months to normalize.      POSSIBLE COMPLICATIONS    BLEEDING:    Leave the bandage in place.  Use tightly rolled up gauze or a cloth to apply direct pressure over the bandage for 20   minutes.  Reapply pressure for an additional 20 minutes if necessary  Call the office or go to the nearest emergency room if pressure fails to stop the bleeding.  Use additional gauze and tape to maintain pressure once the bleeding has stopped.        PAIN:    Post operative pain should slowly get better, never worse.  A severe increase in pain may indicate a problem. Call the office if this occurs.    In case of emergency phone:Dr Jacobs [phone]         ONE WEEK AFTER SURGERY ():  -Remove bandage  -Clean and dry the area with plain tap water using a Q-tip or sterile gauze pad  -Reapply steri  strips down incision and cover with tegaderm  -Leave bandage in place for 1  week  -Remove bandage on  9/18/24       when you remove the bandage, you may resume your regular skin care routine, including washing with mild soap and water, applying moisturizer, make-up and sunscreen.    If there are any open or bleeding areas at the incision/graft site you should begin to cover the area with a bandage daily as follows:    Clean and dry the area with plain tap water using a Q-tip or sterile gauze pad.  Apply Polysporin or Bacitracin ointment to the open area.  Cover the wound with a band-aid or a sterile non-stick gauze pad and micropore paper tape.         SIGNS OF INFECTION  - If you notice any of these signs of infection, call your doctor right away: expanding redness around the wound.  - Yellow or greenish-colored pus or cloudy wound drainage.    - Red streaking spreading from the wound.  - Increased swelling, tenderness, or pain around the wound.   - Fever.    Please remember that yellow and clear drainage from a wound can be normal and related to normal wound healing.  Isolated drainage from a wound without a combination of the above features does not indicate infection.     *Once the bandages are removed, the scar will be red and firm (especially in the lip/chin area). This is normal and will fade in time. It might take 6-12 months for this to happen.     *Massaging the area will help the scar soften and fade quicker. Begin to massage the area one month after the bandages have been removed. To massage apply pressure directly and firmly over the scar with the fingertips and move in a circular motion. Massage the area for a few minutes several times a day. Continue to massage the site for several months.    *Approximately 6-8 weeks after surgery it is not uncommon to see the formation of  tender pimple-like  bump along the scar. This is normal. As the scar continues to mature and the stitches underneath the skin  begin to dissolve, this might occur. Do not pick or squeeze, this will resolve on it s own. Should one break open producing a small amount of drainage, apply Polysporin or Bacitracin ointment a few times a day until the wound is completely healed.    *Numbness in the surgical area is expected. It might take 12-18 months for the feeling to return to normal. During this time sensations of itchiness, tingling and occasional sharp pains might be noted. These feelings are normal and will subside once the nerves have completely healed.

## 2024-09-04 NOTE — LETTER
9/4/2024      Karthik Bird  [address] [facility] [address]      Dear Colleague,    Thank you for referring your patient, Karthik Bird, to the [facility]. Please see a copy of my visit note below.    Karthik Bird is an extremely pleasant 59 year old year old male patient here today for evaluation and managment of cyst on left upper back.  Patient has no other skin complaints today.  Remainder of the HPI, Meds, PMH, Allergies, FH, and SH was reviewed in chart.      Past Medical History:   Diagnosis Date     Depression      Migraine      Seasonal allergies        Past Surgical History:   Procedure Laterality Date     APPENDECTOMY      at 14 yo     COLONOSCOPY       COLONOSCOPY N/A 04/08/2021    Procedure: COLONOSCOPY;  Surgeon: Ruben Salmeron MD;  Location: [facility]     GENITOURINARY SURGERY      previous vasectomy 2/2014     ORTHOPEDIC SURGERY  12/28/2023    full knee replacement     SOFT TISSUE SURGERY  2021    rotator cuff surgery     VASECTOMY  05/30/2014    Procedure: VASECTOMY;  Surgeon: Zaida Slaughter MD;  Location: [facility]        Family History   Problem Relation Age of Onset     Cancer Mother         lung- uterine     Lung Cancer Mother      Breast Cancer Mother      Obesity Mother      Substance Abuse Father      Liver Disease Father      Alzheimer Disease Maternal Grandmother         99     Pneumonia Maternal Grandfather         99       Social History     Socioeconomic History     Marital status:      Spouse name: Not on file     Number of children: Not on file     Years of education: Not on file     Highest education level: Not on file   Occupational History     Not on file   Tobacco Use     Smoking status: Never     Smokeless tobacco: Never   Vaping Use     Vaping status: Never Used   Substance and Sexual Activity     Alcohol use: Not Currently     Comment: twice a year.     Drug use: No     Sexual activity: Yes     Partners: Female     Birth  control/protection: Male Surgical   Other Topics Concern     Parent/sibling w/ CABG, MI or angioplasty before 65F 55M? No   Social History Narrative     Not on file     Social Determinants of Health     Financial Resource Strain: Low Risk  (3/7/2024)    Financial Resource Strain      Within the past 12 months, have you or your family members you live with been unable to get utilities (heat, electricity) when it was really needed?: No   Food Insecurity: Low Risk  (3/7/2024)    Food Insecurity      Within the past 12 months, did you worry that your food would run out before you got money to buy more?: No      Within the past 12 months, did the food you bought just not last and you didn t have money to get more?: No   Transportation Needs: Low Risk  (3/7/2024)    Transportation Needs      Within the past 12 months, has lack of transportation kept you from medical appointments, getting your medicines, non-medical meetings or appointments, work, or from getting things that you need?: No   Physical Activity: Insufficiently Active (3/7/2024)    Exercise Vital Sign      Days of Exercise per Week: 1 day      Minutes of Exercise per Session: 10 min   Stress: No Stress Concern Present (3/7/2024)    [place] - Occupational Stress Questionnaire      Feeling of Stress : Not at all   Social Connections: Unknown (3/7/2024)    Social Connection and Isolation Panel [NHANES]      Frequency of Communication with Friends and Family: Not on file      Frequency of Social Gatherings with Friends and Family: Once a week      Attends [employer] Services: Not on file      Active Member of Clubs or Organizations: Not on file      Attends Club or Organization Meetings: Not on file      Marital Status: Not on file   Interpersonal Safety: Low Risk  (3/7/2024)    Interpersonal Safety      Do you feel physically and emotionally safe where you currently live?: Yes      Within the past 12 months, have you been hit,  slapped, kicked or otherwise physically hurt by someone?: No      Within the past 12 months, have you been humiliated or emotionally abused in other ways by your partner or ex-partner?: No   Housing Stability: Low Risk  (3/7/2024)    Housing Stability      Do you have housing? : Yes      Are you worried about losing your housing?: No       Outpatient Encounter Medications as of 9/4/2024   Medication Sig Dispense Refill     atorvastatin (LIPITOR) 20 MG tablet Take 1 tablet (20 mg) by mouth daily 90 tablet 3     meloxicam (MOBIC) 15 MG tablet Take 15 mg by mouth daily at 2 pm       SUMAtriptan (IMITREX) 25 MG tablet Take 1-2 tablets (25-50 mg) by mouth at onset of headache May repeat in 1-2 hours after onset. 30 tablet 1     tadalafil (CIALIS) 5 MG tablet        tirzepatide-Weight Management (ZEPBOUND) 2.5 MG/0.5ML prefilled pen Inject 0.5 mLs (2.5 mg) Subcutaneous every 7 days 3 mL 0     tirzepatide-Weight Management (ZEPBOUND) 5 MG/0.5ML prefilled pen Inject 0.5 mLs (5 mg) Subcutaneous every 7 days 2 mL 0     tirzepatide-Weight Management (ZEPBOUND) 7.5 MG/0.5ML prefilled pen Inject 0.5 mLs (7.5 mg) Subcutaneous every 7 days 3 mL 0     ZEPBOUND 10 MG/0.5ML prefilled pen INJECT 10 MG SUBCUTANEOUSLY  ONCE A WEEK 4 mL 0     [DISCONTINUED] tirzepatide-Weight Management (ZEPBOUND) 10 MG/0.5ML prefilled pen Inject 0.5 mLs (10 mg) Subcutaneous every 7 days 3 mL 1     No facility-administered encounter medications on file as of 9/4/2024.             O:   NAD, WDWN, Alert & Oriented, Mood & Affect wnl, Vitals stable   General appearance normal   Vitals stable   Alert, oriented and in no acute distress     L upper back 1.6cm nodule      Eyes: Conjunctivae/lids:Normal     ENT: Lips, mucosa: normal    MSK:Normal    Cardiovascular: peripheral edema none    Pulm: Breathing Normal    Neuro/Psych: Orientation:Alert and Orientedx3 ; Mood/Affect:normal       MICRO:   L upper back : Normal epidermis with cyst lined by stratified  squamous epithelium with epidermal keratinization   A/P:  Cyst  Scar discussed with patient   EXCISION OF CYST AND INT: After thorough discussion of PGACAC, consent obtained, anesthesia and prep, the margins of the cyst were identified and an elliptical incision was made encompassing the cyst. The incisions were made through the skin and down to and including the subcutaneous tissue. The cyst was removed en bloc and submitted for frozen pathologic review. hemostasis was achieved. The wound edges were then closed in a layered fashion, being careful not to leave any dead space. Postoperative length was 1.3 cm.   EBL minimal; complications none; wound care routine. The patient was discharged in good condition and will return in one week for wound evaluation.  It was a pleasure speaking to Karthik Bird today.      Again, thank you for allowing me to participate in the care of your patient.        Sincerely,        Sky Jacobs MD

## 2024-09-04 NOTE — PROGRESS NOTES
Karthik Bird is an extremely pleasant 59 year old year old male patient here today for evaluation and managment of cyst on left upper back.  Patient has no other skin complaints today.  Remainder of the HPI, Meds, PMH, Allergies, FH, and SH was reviewed in chart.      Past Medical History:   Diagnosis Date    Depression     Migraine     Seasonal allergies        Past Surgical History:   Procedure Laterality Date    APPENDECTOMY      at 16 yo    COLONOSCOPY      COLONOSCOPY N/A 04/08/2021    Procedure: COLONOSCOPY;  Surgeon: Ruben Salmeron MD;  Location: [facility]    GENITOURINARY SURGERY      previous vasectomy 2/2014    ORTHOPEDIC SURGERY  12/28/2023    full knee replacement    SOFT TISSUE SURGERY  2021    rotator cuff surgery    VASECTOMY  05/30/2014    Procedure: VASECTOMY;  Surgeon: Zaida Slaughter MD;  Location: [facility]        Family History   Problem Relation Age of Onset    Cancer Mother         lung- uterine    Lung Cancer Mother     Breast Cancer Mother     Obesity Mother     Substance Abuse Father     Liver Disease Father     Alzheimer Disease Maternal Grandmother         99    Pneumonia Maternal Grandfather         99       Social History     Socioeconomic History    Marital status:      Spouse name: Not on file    Number of children: Not on file    Years of education: Not on file    Highest education level: Not on file   Occupational History    Not on file   Tobacco Use    Smoking status: Never    Smokeless tobacco: Never   Vaping Use    Vaping status: Never Used   Substance and Sexual Activity    Alcohol use: Not Currently     Comment: twice a year.    Drug use: No    Sexual activity: Yes     Partners: Female     Birth control/protection: Male Surgical   Other Topics Concern    Parent/sibling w/ CABG, MI or angioplasty before 65F 55M? No   Social History Narrative    Not on file     Social Determinants of Health     Financial Resource Strain: Low Risk  (3/7/2024)    Financial Resource  Strain     Within the past 12 months, have you or your family members you live with been unable to get utilities (heat, electricity) when it was really needed?: No   Food Insecurity: Low Risk  (3/7/2024)    Food Insecurity     Within the past 12 months, did you worry that your food would run out before you got money to buy more?: No     Within the past 12 months, did the food you bought just not last and you didn t have money to get more?: No   Transportation Needs: Low Risk  (3/7/2024)    Transportation Needs     Within the past 12 months, has lack of transportation kept you from medical appointments, getting your medicines, non-medical meetings or appointments, work, or from getting things that you need?: No   Physical Activity: Insufficiently Active (3/7/2024)    Exercise Vital Sign     Days of Exercise per Week: 1 day     Minutes of Exercise per Session: 10 min   Stress: No Stress Concern Present (3/7/2024)    [place] citizen [employer] of [employer]ational [employer] - Occupational Stress Questionnaire     Feeling of Stress : Not at all   Social Connections: Unknown (3/7/2024)    Social Connection and Isolation Panel [NHANES]     Frequency of Communication with Friends and Family: Not on file     Frequency of Social Gatherings with Friends and Family: Once a week     Attends [employer] Services: Not on file     Active Member of Clubs or Organizations: Not on file     Attends Club or Organization Meetings: Not on file     Marital Status: Not on file   Interpersonal Safety: Low Risk  (3/7/2024)    Interpersonal Safety     Do you feel physically and emotionally safe where you currently live?: Yes     Within the past 12 months, have you been hit, slapped, kicked or otherwise physically hurt by someone?: No     Within the past 12 months, have you been humiliated or emotionally abused in other ways by your partner or ex-partner?: No   Housing Stability: Low Risk  (3/7/2024)    Housing Stability     Do you have housing? : Yes     Are  you worried about losing your housing?: No       Outpatient Encounter Medications as of 9/4/2024   Medication Sig Dispense Refill    atorvastatin (LIPITOR) 20 MG tablet Take 1 tablet (20 mg) by mouth daily 90 tablet 3    meloxicam (MOBIC) 15 MG tablet Take 15 mg by mouth daily at 2 pm      SUMAtriptan (IMITREX) 25 MG tablet Take 1-2 tablets (25-50 mg) by mouth at onset of headache May repeat in 1-2 hours after onset. 30 tablet 1    tadalafil (CIALIS) 5 MG tablet       tirzepatide-Weight Management (ZEPBOUND) 2.5 MG/0.5ML prefilled pen Inject 0.5 mLs (2.5 mg) Subcutaneous every 7 days 3 mL 0    tirzepatide-Weight Management (ZEPBOUND) 5 MG/0.5ML prefilled pen Inject 0.5 mLs (5 mg) Subcutaneous every 7 days 2 mL 0    tirzepatide-Weight Management (ZEPBOUND) 7.5 MG/0.5ML prefilled pen Inject 0.5 mLs (7.5 mg) Subcutaneous every 7 days 3 mL 0    ZEPBOUND 10 MG/0.5ML prefilled pen INJECT 10 MG SUBCUTANEOUSLY  ONCE A WEEK 4 mL 0    [DISCONTINUED] tirzepatide-Weight Management (ZEPBOUND) 10 MG/0.5ML prefilled pen Inject 0.5 mLs (10 mg) Subcutaneous every 7 days 3 mL 1     No facility-administered encounter medications on file as of 9/4/2024.             O:   NAD, WDWN, Alert & Oriented, Mood & Affect wnl, Vitals stable   General appearance normal   Vitals stable   Alert, oriented and in no acute distress     L upper back 1.6cm nodule      Eyes: Conjunctivae/lids:Normal     ENT: Lips, mucosa: normal    MSK:Normal    Cardiovascular: peripheral edema none    Pulm: Breathing Normal    Neuro/Psych: Orientation:Alert and Orientedx3 ; Mood/Affect:normal       MICRO:   L upper back : Normal epidermis with cyst lined by stratified squamous epithelium with epidermal keratinization   A/P:  Cyst  Scar discussed with patient   EXCISION OF CYST AND INT: After thorough discussion of PGACAC, consent obtained, anesthesia and prep, the margins of the cyst were identified and an elliptical incision was made encompassing the cyst. The  incisions were made through the skin and down to and including the subcutaneous tissue. The cyst was removed en bloc and submitted for frozen pathologic review. hemostasis was achieved. The wound edges were then closed in a layered fashion, being careful not to leave any dead space. Postoperative length was 1.3 cm.   EBL minimal; complications none; wound care routine. The patient was discharged in good condition and will return in one week for wound evaluation.  It was a pleasure speaking to Karthik Bird today.

## 2024-10-29 ENCOUNTER — MYC REFILL (OUTPATIENT)
Dept: FAMILY MEDICINE | Facility: CLINIC | Age: 59
End: 2024-10-29
Payer: COMMERCIAL

## 2024-10-29 DIAGNOSIS — E66.811 OBESITY (BMI 30.0-34.9): ICD-10-CM

## 2024-12-30 ENCOUNTER — MYC REFILL (OUTPATIENT)
Dept: FAMILY MEDICINE | Facility: CLINIC | Age: 59
End: 2024-12-30
Payer: COMMERCIAL

## 2024-12-30 DIAGNOSIS — E66.811 OBESITY (BMI 30.0-34.9): ICD-10-CM

## 2025-02-01 ENCOUNTER — MYC REFILL (OUTPATIENT)
Dept: FAMILY MEDICINE | Facility: CLINIC | Age: 60
End: 2025-02-01
Payer: COMMERCIAL

## 2025-02-01 DIAGNOSIS — E66.811 OBESITY (BMI 30.0-34.9): ICD-10-CM

## 2025-02-03 DIAGNOSIS — E66.811 OBESITY (BMI 30.0-34.9): ICD-10-CM

## 2025-02-03 RX ORDER — TIRZEPATIDE 10 MG/.5ML
INJECTION, SOLUTION SUBCUTANEOUS
Qty: 4 ML | Refills: 0 | Status: SHIPPED | OUTPATIENT
Start: 2025-02-03

## 2025-02-19 DIAGNOSIS — E78.2 MIXED HYPERLIPIDEMIA: ICD-10-CM

## 2025-02-20 RX ORDER — ATORVASTATIN CALCIUM 20 MG/1
20 TABLET, FILM COATED ORAL DAILY
Qty: 90 TABLET | Refills: 0 | Status: SHIPPED | OUTPATIENT
Start: 2025-02-20

## 2025-03-07 PROBLEM — F33.1 MODERATE EPISODE OF RECURRENT MAJOR DEPRESSIVE DISORDER (H): Status: RESOLVED | Noted: 2021-08-01 | Resolved: 2025-03-07

## 2025-03-20 ENCOUNTER — LAB (OUTPATIENT)
Dept: LAB | Facility: CLINIC | Age: 60
End: 2025-03-20
Payer: COMMERCIAL

## 2025-03-20 DIAGNOSIS — Z00.00 ROUTINE GENERAL MEDICAL EXAMINATION AT A HEALTH CARE FACILITY: ICD-10-CM

## 2025-03-20 DIAGNOSIS — Z11.4 SCREENING FOR HIV (HUMAN IMMUNODEFICIENCY VIRUS): ICD-10-CM

## 2025-03-20 DIAGNOSIS — Z12.5 SCREENING FOR PROSTATE CANCER: ICD-10-CM

## 2025-03-20 DIAGNOSIS — E78.2 MIXED HYPERLIPIDEMIA: ICD-10-CM

## 2025-03-20 DIAGNOSIS — Z11.59 NEED FOR HEPATITIS C SCREENING TEST: ICD-10-CM

## 2025-03-20 LAB
ANION GAP SERPL CALCULATED.3IONS-SCNC: 10 MMOL/L (ref 7–15)
BUN SERPL-MCNC: 15.6 MG/DL (ref 8–23)
CALCIUM SERPL-MCNC: 9.7 MG/DL (ref 8.8–10.4)
CHLORIDE SERPL-SCNC: 102 MMOL/L (ref 98–107)
CHOLEST SERPL-MCNC: 111 MG/DL
CREAT SERPL-MCNC: 0.92 MG/DL (ref 0.67–1.17)
EGFRCR SERPLBLD CKD-EPI 2021: >90 ML/MIN/1.73M2
FASTING STATUS PATIENT QL REPORTED: YES
FASTING STATUS PATIENT QL REPORTED: YES
GLUCOSE SERPL-MCNC: 87 MG/DL (ref 70–99)
HCO3 SERPL-SCNC: 26 MMOL/L (ref 22–29)
HCV AB SERPL QL IA: NONREACTIVE
HDLC SERPL-MCNC: 44 MG/DL
HIV 1+2 AB+HIV1 P24 AG SERPL QL IA: NONREACTIVE
LDLC SERPL CALC-MCNC: 54 MG/DL
NONHDLC SERPL-MCNC: 67 MG/DL
POTASSIUM SERPL-SCNC: 5 MMOL/L (ref 3.4–5.3)
PSA SERPL DL<=0.01 NG/ML-MCNC: 1.62 NG/ML (ref 0–4.5)
SODIUM SERPL-SCNC: 138 MMOL/L (ref 135–145)
TRIGL SERPL-MCNC: 66 MG/DL

## 2025-03-26 DIAGNOSIS — E66.811 OBESITY (BMI 30.0-34.9): ICD-10-CM

## 2025-03-26 RX ORDER — TIRZEPATIDE 10 MG/.5ML
INJECTION, SOLUTION SUBCUTANEOUS
Qty: 8 ML | Refills: 0 | Status: SHIPPED | OUTPATIENT
Start: 2025-03-26

## 2025-06-03 DIAGNOSIS — E66.811 OBESITY (BMI 30.0-34.9): Primary | ICD-10-CM

## 2025-07-23 ENCOUNTER — THERAPY VISIT (OUTPATIENT)
Dept: PHYSICAL THERAPY | Facility: CLINIC | Age: 60
End: 2025-07-23
Attending: STUDENT IN AN ORGANIZED HEALTH CARE EDUCATION/TRAINING PROGRAM
Payer: COMMERCIAL

## 2025-07-23 DIAGNOSIS — H81.11 BENIGN PAROXYSMAL POSITIONAL VERTIGO, RIGHT: Primary | ICD-10-CM

## 2025-07-23 PROCEDURE — 97162 PT EVAL MOD COMPLEX 30 MIN: CPT | Mod: GP | Performed by: PHYSICAL THERAPIST

## 2025-07-23 PROCEDURE — 97535 SELF CARE MNGMENT TRAINING: CPT | Mod: GP | Performed by: PHYSICAL THERAPIST

## 2025-07-23 PROCEDURE — 95992 CANALITH REPOSITIONING PROC: CPT | Mod: GP | Performed by: PHYSICAL THERAPIST

## 2025-07-23 NOTE — PROGRESS NOTES
PHYSICAL THERAPY EVALUATION  Type of Visit: Evaluation       Fall Risk Screen:  Have you fallen 2 or more times in the past year?: No  Have you fallen and had an injury in the past year?: No    Subjective   Dizziness daily, looking up to soren, rolling over. Sx 20 years off and on. Used to be spells off and on, now more often. Makes him nauseous. Avoids the positions which cause sx. Later in questioning - reports more moving sensation in his head, world not visually spinning. PMH migraines 4x/year - much different sx with those.      Presenting condition or subjective complaint: constant dizziness, typically triggered by tilting my head  Date of onset: 03/07/25 (MD visit date)    Relevant medical history:     Dates & types of surgery:      Prior diagnostic imaging/testing results:       Prior therapy history for the same diagnosis, illness or injury: No      Prior Level of Function  Transfers: Independent  Ambulation: Independent  ADL: Independent  IADL:     Living Environment  Social support: With family members   Type of home: House   Stairs to enter the home: No       Ramp: No   Stairs inside the home: Yes 4 Is there a railing: Yes     Help at home:    Equipment owned:       Employment: Yes  desk, computer  Hobbies/Interests: skiing, boating, biking mechanical restoration of cars    Patient goals for therapy: not feel constantly dizzy    Pain assessment:      Objective      GAIT: normal, gait with horiz head turn path deviation to same side as head turn, vertical turn normal, pivot normal    BALANCE:   Standing balance FT EO and FT EC with horizontal and vertical head turns x10 no sx    SPECIAL TESTS    Modified CTSIB Conditions (sec) Cond 1: 30  Cond 2: 30  Cond 4: 30  Cond 5 : 13                                VESTIBULAR EVALUATION  ADDITIONAL HISTORY:  Description of symptoms: Off balance; Nausea or vomiting; I feel like I am spinning; Attacks of dizziness  Dizzy attacks:   Start: i've had symptoms  off and on for the last 20 years, now it's every day   Last attack: today   Frequency of occurrences: many times per day   Length of attack: only a couple of seconds to a number of hours.  Difficulty hearing:    Noise in ears? Yes i have stenosis, constant hum  Alleviates symptoms: sitting up right, not moving  Worsens symptoms: laying on my back head down  Activities that bring on symptoms: Bending over; Reaching up; Turning while walking       Pertinent visual history:   Pertinent history of current vestibular problem:   DHI: Total Score: (Patient-Rptd) 60    Cervicogenic Screen    Neck ROM Normal   Vertebral Artery Test Normal   Alar Ligament Test    Transverse Ligament Test Normal   Distraction    Neck Torsion Test (head still, body rotating)    Neck Torsion Test (head and body rotating)         Oculomotor Screen    Ocular ROM    Smooth Pursuit Normal   Saccades Normal   VOR Normal   VOR Cancellation    Head Impulse Test    Convergence Testing         Infrared Goggle Exam Vestibular Suppressant in Last 24 Hours? No  Exam Completed With: Infrared goggles   Spontaneous Nystagmus Negative   Gaze Evoked Nystagmus End beating B   Head Shake Horizontal Nystagmus Negative immediate,then had starnge horiz wiggle   Positional Testing    Supine Head-Hanging Test Negative    Left Right   Leonard-Hallpike Downbeating very mild , small amplitude Downbeating R torsional to just downbeating no torsion, did not fatigue in 45 sec   Sidelying Test     [facility] Supine Roll Test Negative Negative   [facility] Forward Roll Test     Marion and Lean Test -  Sitting Erect    Marion and Lean Test - Seated, Head Bent 60 Degrees Forward    Marion and Lean Test - Seated, Head Bent Backwards       BPPV Canal(s):   BPPV Type:     Dynamic Visual Acuity (DVA)    Static Acuity (LogMar)    Horizontal Head Movement at 1 Hz (LogMar)    Horizontal Head Movement at 2 Hz (LogMar)           Assessment & Plan   CLINICAL IMPRESSIONS  Medical Diagnosis: BPPV R    Treatment  Diagnosis: more likely vestibular hypofunction, possible BPPV R anterior canal   Impression/Assessment: Patient is a 60 year old male with dizziness, balance complaints.  The following significant findings have been identified: Impaired balance, Dizziness, and Disequilibrium . These impairments interfere with their ability to perform work tasks, recreational activities, household chores, and community mobility as compared to previous level of function.     Clinical Decision Making (Complexity):  Clinical Presentation: Evolving/Changing  Clinical Presentation Rationale: based on medical and personal factors listed in PT evaluation  Clinical Decision Making (Complexity): Moderate complexity    PLAN OF CARE  Treatment Interventions:  Interventions: Neuromuscular Re-education, Therapeutic Activity, Therapeutic Exercise, Self-Care/Home Management, Canalith Repositioning    Long Term Goals     PT Goal 1  Goal Identifier: 1  Goal Description: pt will be able to do look up/ work overhead w/o dizziness in4wks  Target Date: 08/20/25  PT Goal 2  Goal Identifier: 2  Goal Description: 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   Patient/Family/caregiver agrees with Plan of Care.     Evaluation Time:     PT Eval, Moderate Complexity Minutes (69767): 20       Signing Clinician: Kris Hoenk, PT

## 2025-08-17 ENCOUNTER — MYC MEDICAL ADVICE (OUTPATIENT)
Dept: FAMILY MEDICINE | Facility: CLINIC | Age: 60
End: 2025-08-17
Payer: COMMERCIAL

## 2025-08-17 DIAGNOSIS — E66.811 OBESITY (BMI 30.0-34.9): ICD-10-CM
